# Patient Record
Sex: FEMALE | Race: WHITE | NOT HISPANIC OR LATINO | Employment: FULL TIME | URBAN - METROPOLITAN AREA
[De-identification: names, ages, dates, MRNs, and addresses within clinical notes are randomized per-mention and may not be internally consistent; named-entity substitution may affect disease eponyms.]

---

## 2017-05-04 ENCOUNTER — CONVERSION ENCOUNTER (OUTPATIENT)
Dept: RADIOLOGY | Facility: IMAGING CENTER | Age: 72
End: 2017-05-04

## 2017-05-04 ENCOUNTER — GENERIC CONVERSION - ENCOUNTER (OUTPATIENT)
Dept: OTHER | Facility: OTHER | Age: 72
End: 2017-05-04

## 2017-06-05 ENCOUNTER — LAB REQUISITION (OUTPATIENT)
Dept: LAB | Facility: HOSPITAL | Age: 72
End: 2017-06-05
Payer: COMMERCIAL

## 2017-06-05 ENCOUNTER — ALLSCRIPTS OFFICE VISIT (OUTPATIENT)
Dept: OTHER | Facility: OTHER | Age: 72
End: 2017-06-05

## 2017-06-05 DIAGNOSIS — Z01.419 ENCOUNTER FOR GYNECOLOGICAL EXAMINATION WITHOUT ABNORMAL FINDING: ICD-10-CM

## 2017-06-05 PROCEDURE — G0143 SCR C/V CYTO,THINLAYER,RESCR: HCPCS | Performed by: OBSTETRICS & GYNECOLOGY

## 2017-06-12 ENCOUNTER — GENERIC CONVERSION - ENCOUNTER (OUTPATIENT)
Dept: OTHER | Facility: OTHER | Age: 72
End: 2017-06-12

## 2017-06-12 LAB
LAB AP GYN PRIMARY INTERPRETATION: NORMAL
Lab: NORMAL

## 2017-11-20 ENCOUNTER — GENERIC CONVERSION - ENCOUNTER (OUTPATIENT)
Dept: OTHER | Facility: OTHER | Age: 72
End: 2017-11-20

## 2018-01-13 VITALS
WEIGHT: 143 LBS | BODY MASS INDEX: 24.41 KG/M2 | DIASTOLIC BLOOD PRESSURE: 62 MMHG | HEIGHT: 64 IN | SYSTOLIC BLOOD PRESSURE: 124 MMHG

## 2018-01-14 NOTE — RESULT NOTES
Verified Results  (1) THIN PREP PAP WITH IMAGING 12Jun2017 02:02PM Jon Weems     Test Name Result Flag Reference   LAB AP CASE REPORT (Report)     Gynecologic Cytology Report            Case: [id number]                  Authorizing Provider: Codie Doty MD     Collected:      06/05/2017           First Screen:     SHERIE Berrios Received:      06/07/2017 4955        Specimen:  LIQUID-BASED PAP, SCREENING, Cervix   LAB AP GYN PRIMARY INTERPRETATION      Negative for intraepithelial lesion or malignancy  Electronically signed by SHERIE Berrios on 6/12/2017 at 2:02 PM   LAB AP GYN SPECIMEN ADEQUACY      Satisfactory for evaluation  Absence of endocervical/transformation zone component  LAB AP GYN NOTE      This specimen was analyzed by the Thin Prep Imaging System  Due to   technical Imaging issues or the physical properties of the slide specimen,   comprehensive manual rescreening by a Cytotechnologist, and/or Pathologist   was indicated  LAB AP GYN ADDITIONAL INFORMATION (Report)     [employer]'s FDA approved ,  and ThinPrep Imaging System are   utilized with strict adherence to the 's instruction manual to   prepare gynecologic and non-gynecologic cytology specimens for the   production of ThinPrep slides as well as for gynecologic ThinPrep imaging  These processes have been validated by our laboratory and/or by the     The Pap test is not a diagnostic procedure and should not be used as the   sole means to detect cervical cancer  It is only a screening procedure to   aid in the detection of cervical cancer and its precursors  Both   false-negative and false-positive results have been experienced  Your   patient's test result should be interpreted in this context together with   the history and clinical findings     LAB AP LMP

## 2018-01-17 NOTE — RESULT NOTES
Verified Results  (1) THIN PREP PAP WITH IMAGING 03Jun2016 11:13AM Charmco Spotted     Test Name Result Flag Reference   LAB AP CASE REPORT (Report)     Gynecologic Cytology Report            Case: [id number]                  Authorizing Provider: Naomi Campbell MD     Collected:      06/03/2016 1113        First Screen:     SHERIE Mueller Received:      06/06/2016 1113        Specimen:  LIQUID-BASED PAP, SCREENING, Vaginal   LAB AP GYN PRIMARY INTERPRETATION      Negative for intraepithelial lesion or malignancy  Electronically signed by SHERIE Mueller on 6/16/2016 at 4:26 PM   LAB AP GYN SPECIMEN ADEQUACY      Satisfactory for evaluation  LAB AP GYN ADDITIONAL INFORMATION (Report)     [employer] FDA approved ,  and ThinPrep Imaging System are   utilized with strict adherence to the 's instruction manual to   prepare gynecologic and non-gynecologic cytology specimens for the   production of ThinPrep slides as well as for gynecologic ThinPrep imaging  These processes have been validated by our laboratory and/or by the     The Pap test is not a diagnostic procedure and should not be used as the   sole means to detect cervical cancer  It is only a screening procedure to   aid in the detection of cervical cancer and its precursors  Both   false-negative and false-positive results have been experienced  Your   patient's test result should be interpreted in this context together with   the history and clinical findings

## 2018-01-22 VITALS
DIASTOLIC BLOOD PRESSURE: 80 MMHG | TEMPERATURE: 96.9 F | WEIGHT: 144 LBS | RESPIRATION RATE: 18 BRPM | SYSTOLIC BLOOD PRESSURE: 140 MMHG | BODY MASS INDEX: 24.59 KG/M2 | HEIGHT: 64 IN | HEART RATE: 80 BPM

## 2018-05-04 DIAGNOSIS — Z12.31 ENCOUNTER FOR SCREENING MAMMOGRAM FOR MALIGNANT NEOPLASM OF BREAST: ICD-10-CM

## 2018-05-10 ENCOUNTER — CONVERSION ENCOUNTER (OUTPATIENT)
Dept: RADIOLOGY | Facility: IMAGING CENTER | Age: 73
End: 2018-05-10

## 2018-06-08 ENCOUNTER — ANNUAL EXAM (OUTPATIENT)
Dept: OBGYN CLINIC | Facility: CLINIC | Age: 73
End: 2018-06-08
Payer: COMMERCIAL

## 2018-06-08 VITALS
HEIGHT: 63 IN | BODY MASS INDEX: 25.52 KG/M2 | DIASTOLIC BLOOD PRESSURE: 80 MMHG | WEIGHT: 144 LBS | SYSTOLIC BLOOD PRESSURE: 120 MMHG

## 2018-06-08 DIAGNOSIS — Z01.419 ENCNTR FOR GYN EXAM (GENERAL) (ROUTINE) W/O ABN FINDINGS: Primary | ICD-10-CM

## 2018-06-08 DIAGNOSIS — Z78.0 MENOPAUSE: ICD-10-CM

## 2018-06-08 DIAGNOSIS — Z12.31 ENCOUNTER FOR SCREENING MAMMOGRAM FOR MALIGNANT NEOPLASM OF BREAST: ICD-10-CM

## 2018-06-08 PROCEDURE — 99213 OFFICE O/P EST LOW 20 MIN: CPT | Performed by: OBSTETRICS & GYNECOLOGY

## 2018-06-08 PROCEDURE — G0145 SCR C/V CYTO,THINLAYER,RESCR: HCPCS | Performed by: OBSTETRICS & GYNECOLOGY

## 2018-06-08 RX ORDER — CHOLECALCIFEROL (VITAMIN D3) 50 MCG
CAPSULE ORAL DAILY
COMMUNITY

## 2018-06-08 RX ORDER — DIPHENOXYLATE HYDROCHLORIDE AND ATROPINE SULFATE 2.5; .025 MG/1; MG/1
1 TABLET ORAL DAILY
COMMUNITY

## 2018-06-08 RX ORDER — SPIRONOLACTONE 50 MG/1
TABLET, FILM COATED ORAL
COMMUNITY
Start: 2018-05-07 | End: 2020-06-02 | Stop reason: ALTCHOICE

## 2018-06-08 RX ORDER — MULTIVITAMIN WITH IRON
TABLET ORAL
COMMUNITY

## 2018-06-08 NOTE — PATIENT INSTRUCTIONS
Patient was told that her breast and pelvic exam are normal  She will continue with Premarin 0 3 mg 3 times a week

## 2018-06-08 NOTE — PROGRESS NOTES
Assessment/Plan this 76-year-old patient is seen today in follow-up for her ovarian cancer of 12 years ago  She has no specific complaints at this time  This     Subjective:     Patient ID: Jose Capellan is a 74 y o  female  HPI this 76-year-old patient has had no vaginal bleeding or episodes of vaginitis over the past year  She does wear a liner daily for urine stress incontinence  She has had no urinary tract infections over the past year  She presently takes Premarin 310 mg 3 times a week  She has occasional mild hot flashes  She sleeps 6-7 hours at  Her bowel function is normal  Kilmichael is not occurring  Review of Systems  was see HPI    Objective:     Physical Exam  breast exam is normal  Her pelvic exam reveals no masses in the pelvis  The vaginal cuff is well supported  There is no blood or discharge in the vagina  Rectal exam shows no bladder masses in the rectum and no nodularity in the cul-de-sac

## 2018-06-08 NOTE — PROGRESS NOTES
Assessment/Plan: This 68-year-old patient is seen today for revisit due to her history of ovarian cancer 12 years ago  Diagnoses and all orders for this visit:    Encntr for gyn exam (general) (routine) w/o abn findings  -     Liquid-based pap, screening    Encounter for screening mammogram for malignant neoplasm of breast  -     Mammo screening bilateral w cad; Future          Subjective:     Patient ID: Isha Gurrola is a 74 y o  female  HPI this 68-year-old patient was treated for ovarian cancer with [facility] lymph node evaluation 12 years ago  She has been followed by Oncology since then  She also does take Premarin 0 3 mg daily  She has no chronic headaches fainting spells  intercourse is not occurring at this time  Her bowel function is normal   She does wear liners for urine stress incontinence  She has had no urinary tract infections over the past year  Review of Systems  see HPI    Objective:     Physical Exam  breast exam is normal  Pelvic exam reveals no masses in the pelvis  The vaginal cuff is well supported  There is no blood or discharge in the vagina  The vulva is normal  Rectal exam shows no masses or blood in the vagina and no nodularity in the cul-de-sac

## 2018-06-08 NOTE — PATIENT INSTRUCTIONS
The patient was told that her pelvic exam is normal in her breast exam is normal  I saw her today for followup as her revisit for ovarian cancer

## 2018-06-12 LAB
LAB AP GYN PRIMARY INTERPRETATION: NORMAL
Lab: NORMAL

## 2018-11-19 ENCOUNTER — OFFICE VISIT (OUTPATIENT)
Dept: SURGERY | Facility: CLINIC | Age: 73
End: 2018-11-19
Payer: COMMERCIAL

## 2018-11-19 VITALS
SYSTOLIC BLOOD PRESSURE: 116 MMHG | RESPIRATION RATE: 16 BRPM | HEIGHT: 64 IN | WEIGHT: 142 LBS | TEMPERATURE: 97.5 F | DIASTOLIC BLOOD PRESSURE: 76 MMHG | HEART RATE: 81 BPM | BODY MASS INDEX: 24.24 KG/M2

## 2018-11-19 DIAGNOSIS — Z12.39 BREAST CANCER SCREENING, HIGH RISK PATIENT: ICD-10-CM

## 2018-11-19 DIAGNOSIS — N64.4 BREAST PAIN, LEFT: Primary | ICD-10-CM

## 2018-11-19 PROCEDURE — 99213 OFFICE O/P EST LOW 20 MIN: CPT | Performed by: SURGERY

## 2018-11-19 RX ORDER — CONJUGATED ESTROGENS 0.3 MG/1
TABLET, FILM COATED ORAL
COMMUNITY
Start: 2018-10-22 | End: 2019-06-17

## 2018-11-19 NOTE — PROGRESS NOTES
Assessment/Plan:   Merary Rubio is a 74 y  o female who is here for a annual breast exam for his risk   High risk for screening due to history of ovarian cancer she is 12 years out from surgery and chemotherapy  Patient followed with her OBGYN  Patient with history of 2 stereotactic biopsies which were benign 1 in 2014 and 2007  Patient due for her colonoscopy in 2020  On exam and upon review of recent breast imaging: BiRADs 2: follow up 1 year  Prominent tender left outer quadrant, this area previously biopsied and benign    Plan: Follow up mammogram with US  Continue to follow and return to office sooner if patient notices change               _______________________________________________________  HPI:  Merary Rubio is a 74 y  o female who was referred for evaluation of No chief complaint on file       Currently: Painful left upper outer area of breast , That waxes and wanes  Duration symptoms: intermittent over past months     Symptoms:   neg for - new or changing breast lumps, nipple changes and nipple discharge    Most recent mammogram: 2018 Bi RADS 2    Previous breast biopsy: sterotactic 2014 and 2007 left breast negative    Family history: Mother    ROS:  General ROS: negative  negative for - chills, fatigue, fever or night sweats, weight loss  Respiratory ROS: no cough, shortness of breath, or wheezing  Cardiovascular ROS: no chest pain or dyspnea on exertion  Genito-Urinary ROS: no dysuria, trouble voiding, or hematuria  Musculoskeletal ROS: negative for - gait disturbance, joint pain or muscle pain  Neurological ROS: no TIA or stroke symptoms  Breast ROS: see HPI  GI ROS: see HPI  Skin ROS: no new rashes or lesions   Lymphatic ROS: no new adenopathy noted by pt  GYN ROS: see HPI, no new GYN history or bleeding noted  Psy ROS: no new mental or behavioral disturbances               There is no problem list on file for this patient          Allergies: Metoclopramide    Meds: Current Outpatient Prescriptions:     aspirin 81 MG tablet, Take by mouth, Disp: , Rfl:     Calcium Citrate-Vitamin D (CALCIUM CITRATE + D) 250-200 MG-UNIT TABS, Take by mouth, Disp: , Rfl:     HM VITAMIN D3 2000 units CAPS, Take by mouth, Disp: , Rfl:     multivitamin (THERAGRAN) TABS, Take 1 tablet by mouth, Disp: , Rfl:     PREMARIN 0 3 MG tablet, , Disp: , Rfl:     pyridoxine (VITAMIN B6) 100 mg tablet, Take by mouth, Disp: , Rfl:     spironolactone (ALDACTONE) 50 mg tablet, , Disp: , Rfl:     conjugated estrogens (PREMARIN) 0 3 mg tablet, Take 1 tablet (0 3 mg total) by mouth 3 (three) times a week for 90 days, Disp: 36 tablet, Rfl: 0    PMH:   Past Medical History:   Diagnosis Date    Basal cell carcinoma     Osteopenia     Ovarian cancer ([facility] [address] ) 2005       PSHx:   Past Surgical History:   Procedure Laterality Date    ABDOMINAL HYSTERECTOMY N/A     ovarian cancer    BOWEL RESECTION      BREAST BIOPSY      COLONOSCOPY      LYMPHADENECTOMY      SKIN CANCER EXCISION         Family History:   Family History   Problem Relation Age of Onset    Heart failure Mother     Hypertension Father     Stroke Father     No Known Problems Brother     No Known Problems Brother        Social History:  reports that she has quit smoking  She has never used smokeless tobacco  She reports that she drinks alcohol  She reports that she does not use drugs  Imaging: No new pertinent imaging studies         No results found for: WBC, HGB, HCT, MCV, PLT  No results found for: NA, K, CL, CO2, ANIONGAP, BUN, CREATININE, GLUCOSE, GLUF, CALCIUM, CORRECTEDCA, AST, ALT, ALKPHOS, PROT, BILITOT, EGFR          PHYSICAL EXAM  General Appearance:    Alert, cooperative, no distress, heathy   Head:    Normocephalic without obvious abnormality   Eyes:    PERRL, conjunctiva/corneas clear, EOM's intact        Neck:   Supple, no adenopathy, no JVD   Back:     Symmetric, no spinal or CVA tenderness   Lungs:     Clear to auscultation bilaterally, no wheezing or rhonchi   Heart:  Breast:    Regular rate and rhythm, S1 and S2 normal, no murmur    Inspection negative, No nipple retraction or dimpling, No nipple discharge or bleeding, symmetric fibrous changes in both upper outer quadrants left more prominent than right  Abdomen:     Soft, nontender   Extremities:   Extremities normal  No clubbing, cyanosis or edema   Psych:   Normal Affect, AOx3  Neurologic:  Skin:   CNII-XII intact  Strength symmetric, speech intact    Warm, dry, intact, no visible rashes or lesions                       Signature:     Delores Ward MD    Date: 11/19/2018 Time: 9:01 AM                 Some portions of this record may have been generated with voice recognition software  There may be translation, syntax,  or grammatical errors  Occasional wrong word or "sound-a-like" substitutions may have occurred due to the inherent limitations of the voice recognition software  Read the chart carefully and recognize, using context, where substitutions may have occurred  If you have any questions, please contact the dictating provider for clarification or correction, as needed  This encounter has been coded by a physician, non certified coder

## 2018-11-19 NOTE — LETTER
November 19, 2018     Celestino Ramirez, [address] [facility] [address]    Patient: Al Disla   YOB: 1945   Date of Visit: 11/19/2018       Dear Dr Christine Funez: Thank you for referring Al Disla to me for evaluation  Below are my notes for this consultation  If you have questions, please do not hesitate to call me  I look forward to following your patient along with you  Sincerely,        Kris Sparks MD        CC: No Recipients  Farnaz Man  11/19/2018  9:10 AM  Sign at close encounter  Assessment/Plan:   Al Disla is a 74 y  o female who is here for a annual breast exam for his risk   High risk for screening due to history of ovarian cancer she is 12 years out from surgery and chemotherapy  Patient followed with her OBGYN  Patient with history of 2 stereotactic biopsies which were benign 1 in 2014 and 2007  Patient due for her colonoscopy in 2020  On exam and upon review of recent breast imaging: BiRADs 2: follow up 1 year  Prominent tender left outer quadrant, this area previously biopsied and benign    Plan: Follow up mammogram with US  Continue to follow and return to office sooner if patient notices change               _______________________________________________________  HPI:  Al Disla is a 74 y  o female who was referred for evaluation of No chief complaint on file       Currently: Painful left upper outer area of breast , That waxes and wanes  Duration symptoms: intermittent over past months     Symptoms:   neg for - new or changing breast lumps, nipple changes and nipple discharge    Most recent mammogram: 2018 Bi RADS 2    Previous breast biopsy: sterotactic 2014 and 2007 left breast negative    Family history:  Mother    ROS:  General ROS: negative  negative for - chills, fatigue, fever or night sweats, weight loss  Respiratory ROS: no cough, shortness of breath, or wheezing  Cardiovascular ROS: no chest pain or dyspnea on exertion  Genito-Urinary ROS: no dysuria, trouble voiding, or hematuria  Musculoskeletal ROS: negative for - gait disturbance, joint pain or muscle pain  Neurological ROS: no TIA or stroke symptoms  Breast ROS: see HPI  GI ROS: see HPI  Skin ROS: no new rashes or lesions   Lymphatic ROS: no new adenopathy noted by pt  GYN ROS: see HPI, no new GYN history or bleeding noted  Psy ROS: no new mental or behavioral disturbances               There is no problem list on file for this patient  Allergies: Metoclopramide    Meds:   Current Outpatient Prescriptions:     aspirin 81 MG tablet, Take by mouth, Disp: , Rfl:     Calcium Citrate-Vitamin D (CALCIUM CITRATE + D) 250-200 MG-UNIT TABS, Take by mouth, Disp: , Rfl:     HM VITAMIN D3 2000 units CAPS, Take by mouth, Disp: , Rfl:     multivitamin (THERAGRAN) TABS, Take 1 tablet by mouth, Disp: , Rfl:     PREMARIN 0 3 MG tablet, , Disp: , Rfl:     pyridoxine (VITAMIN B6) 100 mg tablet, Take by mouth, Disp: , Rfl:     spironolactone (ALDACTONE) 50 mg tablet, , Disp: , Rfl:     conjugated estrogens (PREMARIN) 0 3 mg tablet, Take 1 tablet (0 3 mg total) by mouth 3 (three) times a week for 90 days, Disp: 36 tablet, Rfl: 0    PMH:   Past Medical History:   Diagnosis Date    Basal cell carcinoma     Osteopenia     Ovarian cancer ([facility] [address] ) 2005       PSHx:   Past Surgical History:   Procedure Laterality Date    ABDOMINAL HYSTERECTOMY N/A     ovarian cancer    BOWEL RESECTION      BREAST BIOPSY      COLONOSCOPY      LYMPHADENECTOMY      SKIN CANCER EXCISION         Family History:   Family History   Problem Relation Age of Onset    Heart failure Mother     Hypertension Father     Stroke Father     No Known Problems Brother     No Known Problems Brother        Social History:  reports that she has quit smoking  She has never used smokeless tobacco  She reports that she drinks alcohol   She reports that she does not use drugs           Imaging: No new pertinent imaging studies  No results found for: WBC, HGB, HCT, MCV, PLT  No results found for: NA, K, CL, CO2, ANIONGAP, BUN, CREATININE, GLUCOSE, GLUF, CALCIUM, CORRECTEDCA, AST, ALT, ALKPHOS, PROT, BILITOT, EGFR          PHYSICAL EXAM  General Appearance:    Alert, cooperative, no distress, heathy   Head:    Normocephalic without obvious abnormality   Eyes:    PERRL, conjunctiva/corneas clear, EOM's intact        Neck:   Supple, no adenopathy, no JVD   Back:     Symmetric, no spinal or CVA tenderness   Lungs:     Clear to auscultation bilaterally, no wheezing or rhonchi   Heart:  Breast:    Regular rate and rhythm, S1 and S2 normal, no murmur    Inspection negative, No nipple retraction or dimpling, No nipple discharge or bleeding, symmetric fibrous changes in both upper outer quadrants left more prominent than right  Abdomen:     Soft, nontender   Extremities:   Extremities normal  No clubbing, cyanosis or edema   Psych:   Normal Affect, AOx3  Neurologic:  Skin:   CNII-XII intact  Strength symmetric, speech intact    Warm, dry, intact, no visible rashes or lesions                       Signature:     James Goodwin MD    Date: 11/19/2018 Time: 9:01 AM                 Some portions of this record may have been generated with voice recognition software  There may be translation, syntax,  or grammatical errors  Occasional wrong word or "sound-a-like" substitutions may have occurred due to the inherent limitations of the voice recognition software  Read the chart carefully and recognize, using context, where substitutions may have occurred  If you have any questions, please contact the dictating provider for clarification or correction, as needed  This encounter has been coded by a physician, non certified coder

## 2019-04-29 ENCOUNTER — TELEPHONE (OUTPATIENT)
Dept: OBGYN CLINIC | Facility: CLINIC | Age: 74
End: 2019-04-29

## 2019-04-29 DIAGNOSIS — R92.8 ABNORMAL MAMMOGRAM: Primary | ICD-10-CM

## 2019-04-30 ENCOUNTER — TRANSCRIBE ORDERS (OUTPATIENT)
Dept: ADMINISTRATIVE | Facility: HOSPITAL | Age: 74
End: 2019-04-30

## 2019-04-30 DIAGNOSIS — R92.8 ABNORMAL MAMMOGRAM: Primary | ICD-10-CM

## 2019-05-17 ENCOUNTER — TRANSCRIBE ORDERS (OUTPATIENT)
Dept: ADMINISTRATIVE | Facility: HOSPITAL | Age: 74
End: 2019-05-17

## 2019-05-17 DIAGNOSIS — N64.4 PAIN IN BREAST: Primary | ICD-10-CM

## 2019-05-21 ENCOUNTER — HOSPITAL ENCOUNTER (OUTPATIENT)
Dept: MAMMOGRAPHY | Facility: CLINIC | Age: 74
Discharge: HOME | End: 2019-05-21
Payer: COMMERCIAL

## 2019-05-21 ENCOUNTER — HOSPITAL ENCOUNTER (OUTPATIENT)
Dept: MAMMOGRAPHY | Facility: CLINIC | Age: 74
Discharge: HOME | End: 2019-05-21
Attending: SURGERY
Payer: COMMERCIAL

## 2019-05-21 ENCOUNTER — TELEPHONE (OUTPATIENT)
Dept: OBGYN CLINIC | Facility: CLINIC | Age: 74
End: 2019-05-21

## 2019-05-21 VITALS — BODY MASS INDEX: 24.24 KG/M2 | WEIGHT: 142 LBS | HEIGHT: 64 IN

## 2019-05-21 DIAGNOSIS — N64.4 BREAST PAIN, LEFT: ICD-10-CM

## 2019-05-21 DIAGNOSIS — N64.4 PAIN IN BREAST: ICD-10-CM

## 2019-05-21 PROCEDURE — 76642 ULTRASOUND BREAST LIMITED: CPT

## 2019-05-21 PROCEDURE — 77066 DX MAMMO INCL CAD BI: CPT

## 2019-05-30 ENCOUNTER — HOSPITAL ENCOUNTER (OUTPATIENT)
Dept: MAMMOGRAPHY | Facility: CLINIC | Age: 74
Discharge: HOME | End: 2019-05-30
Payer: COMMERCIAL

## 2019-05-30 ENCOUNTER — HOSPITAL ENCOUNTER (OUTPATIENT)
Dept: ULTRASOUND IMAGING | Facility: CLINIC | Age: 74
Discharge: HOME | End: 2019-05-30
Admitting: RADIOLOGY
Payer: COMMERCIAL

## 2019-05-30 VITALS
BODY MASS INDEX: 24.24 KG/M2 | SYSTOLIC BLOOD PRESSURE: 102 MMHG | DIASTOLIC BLOOD PRESSURE: 64 MMHG | WEIGHT: 142 LBS | HEIGHT: 64 IN | HEART RATE: 72 BPM

## 2019-05-30 DIAGNOSIS — R92.8 ABNORMAL ULTRASOUND OF BREAST: ICD-10-CM

## 2019-05-30 PROCEDURE — 88367 INSITU HYBRIDIZATION AUTO: CPT | Performed by: PATHOLOGY

## 2019-05-30 PROCEDURE — 19083 BX BREAST 1ST LESION US IMAG: CPT

## 2019-05-30 PROCEDURE — 88305 TISSUE EXAM BY PATHOLOGIST: CPT | Performed by: PATHOLOGY

## 2019-05-30 PROCEDURE — 88360 TUMOR IMMUNOHISTOCHEM/MANUAL: CPT | Performed by: PATHOLOGY

## 2019-05-30 RX ORDER — LIDOCAINE HYDROCHLORIDE 10 MG/ML
4 INJECTION, SOLUTION INFILTRATION; PERINEURAL ONCE
Status: COMPLETED | OUTPATIENT
Start: 2019-05-30 | End: 2019-05-30

## 2019-05-30 RX ADMIN — LIDOCAINE HYDROCHLORIDE 4 ML: 10 INJECTION, SOLUTION INFILTRATION; PERINEURAL at 10:51

## 2019-05-30 RX ADMIN — LIDOCAINE HYDROCHLORIDE 4 ML: 10 INJECTION, SOLUTION INFILTRATION; PERINEURAL at 10:46

## 2019-06-06 ENCOUNTER — OFFICE VISIT (OUTPATIENT)
Dept: SURGERY | Facility: CLINIC | Age: 74
End: 2019-06-06
Payer: COMMERCIAL

## 2019-06-06 VITALS
SYSTOLIC BLOOD PRESSURE: 142 MMHG | HEIGHT: 64 IN | BODY MASS INDEX: 24.24 KG/M2 | RESPIRATION RATE: 18 BRPM | HEART RATE: 77 BPM | DIASTOLIC BLOOD PRESSURE: 68 MMHG | TEMPERATURE: 97.8 F | WEIGHT: 142 LBS

## 2019-06-06 DIAGNOSIS — I89.0 LYMPHEDEMA OF LEFT LEG: ICD-10-CM

## 2019-06-06 DIAGNOSIS — C50.412 MALIGNANT NEOPLASM OF UPPER-OUTER QUADRANT OF LEFT FEMALE BREAST, UNSPECIFIED ESTROGEN RECEPTOR STATUS (HCC): Primary | ICD-10-CM

## 2019-06-06 PROCEDURE — 99214 OFFICE O/P EST MOD 30 MIN: CPT | Performed by: SURGERY

## 2019-06-07 ENCOUNTER — TELEPHONE (OUTPATIENT)
Dept: SURGERY | Facility: CLINIC | Age: 74
End: 2019-06-07

## 2019-06-10 ENCOUNTER — DOCUMENTATION (OUTPATIENT)
Dept: HEMATOLOGY ONCOLOGY | Facility: CLINIC | Age: 74
End: 2019-06-10

## 2019-06-10 ENCOUNTER — ANNUAL EXAM (OUTPATIENT)
Dept: OBGYN CLINIC | Facility: CLINIC | Age: 74
End: 2019-06-10
Payer: COMMERCIAL

## 2019-06-10 ENCOUNTER — TELEPHONE (OUTPATIENT)
Dept: OTHER | Facility: HOSPITAL | Age: 74
End: 2019-06-10

## 2019-06-10 VITALS
BODY MASS INDEX: 24.45 KG/M2 | DIASTOLIC BLOOD PRESSURE: 66 MMHG | WEIGHT: 138 LBS | HEIGHT: 63 IN | SYSTOLIC BLOOD PRESSURE: 118 MMHG

## 2019-06-10 DIAGNOSIS — Z12.31 ENCOUNTER FOR SCREENING MAMMOGRAM FOR MALIGNANT NEOPLASM OF BREAST: Primary | ICD-10-CM

## 2019-06-10 DIAGNOSIS — C50.912 BREAST CANCER, LEFT (HCC): Primary | ICD-10-CM

## 2019-06-10 DIAGNOSIS — Z01.419 ENCOUNTER FOR GYNECOLOGICAL EXAMINATION (GENERAL) (ROUTINE) WITHOUT ABNORMAL FINDINGS: ICD-10-CM

## 2019-06-10 PROCEDURE — G0145 SCR C/V CYTO,THINLAYER,RESCR: HCPCS | Performed by: OBSTETRICS & GYNECOLOGY

## 2019-06-10 PROCEDURE — S0612 ANNUAL GYNECOLOGICAL EXAMINA: HCPCS | Performed by: OBSTETRICS & GYNECOLOGY

## 2019-06-17 ENCOUNTER — CONSULT (OUTPATIENT)
Dept: HEMATOLOGY ONCOLOGY | Facility: CLINIC | Age: 74
End: 2019-06-17
Payer: COMMERCIAL

## 2019-06-17 VITALS
RESPIRATION RATE: 16 BRPM | SYSTOLIC BLOOD PRESSURE: 140 MMHG | TEMPERATURE: 98.4 F | WEIGHT: 138.8 LBS | BODY MASS INDEX: 24.59 KG/M2 | HEIGHT: 63 IN | DIASTOLIC BLOOD PRESSURE: 70 MMHG | HEART RATE: 78 BPM | OXYGEN SATURATION: 98 %

## 2019-06-17 DIAGNOSIS — Z17.0 MALIGNANT NEOPLASM OF UPPER-OUTER QUADRANT OF LEFT BREAST IN FEMALE, ESTROGEN RECEPTOR POSITIVE (HCC): Primary | ICD-10-CM

## 2019-06-17 DIAGNOSIS — C50.412 MALIGNANT NEOPLASM OF UPPER-OUTER QUADRANT OF LEFT BREAST IN FEMALE, ESTROGEN RECEPTOR POSITIVE (HCC): Primary | ICD-10-CM

## 2019-06-17 LAB
LAB AP GYN PRIMARY INTERPRETATION: NORMAL
Lab: NORMAL

## 2019-06-17 PROCEDURE — 99244 OFF/OP CNSLTJ NEW/EST MOD 40: CPT | Performed by: INTERNAL MEDICINE

## 2019-06-19 ENCOUNTER — TELEPHONE (OUTPATIENT)
Dept: SURGERY | Facility: CLINIC | Age: 74
End: 2019-06-19

## 2019-06-19 DIAGNOSIS — F41.9 ANXIETY: Primary | ICD-10-CM

## 2019-06-26 RX ORDER — LORAZEPAM 0.5 MG/1
0.5 TABLET ORAL ONCE
Qty: 2 TABLET | Refills: 0 | Status: SHIPPED | OUTPATIENT
Start: 2019-06-26 | End: 2019-07-08 | Stop reason: ALTCHOICE

## 2019-07-01 ENCOUNTER — HOSPITAL ENCOUNTER (OUTPATIENT)
Dept: RADIOLOGY | Facility: HOSPITAL | Age: 74
Discharge: HOME | End: 2019-07-01
Attending: SURGERY
Payer: COMMERCIAL

## 2019-07-01 DIAGNOSIS — C50.912 BREAST CANCER, LEFT (HCC): ICD-10-CM

## 2019-07-01 PROCEDURE — C8908 MRI W/O FOL W/CONT, BREAST,: HCPCS

## 2019-07-01 PROCEDURE — 77049 MRI BREAST C-+ W/CAD BI: CPT

## 2019-07-02 ENCOUNTER — TELEPHONE (OUTPATIENT)
Dept: OTHER | Facility: HOSPITAL | Age: 74
End: 2019-07-02

## 2019-07-02 NOTE — TELEPHONE ENCOUNTER
Greetings! I am  back from my mission trip  I reviewed Aliyah's studies  Her MRI shows no chest wall invasion and with 23 mm tumor  Her genetic testing is negative although there are some variance of uncertain clinical significance  These results are on the chart  The genetic testing is under media  She has been informed of both results  She is very confused despite multiple efforts to clarify the issues with her and her   We have spent a great deal of time reviewing this with her  The issue is if she wants lumpectomy, ( I am asking you your opinion) , she might  benefit from several months of tamoxifen therapy prior to surgery? The tumor is smaller than originally discussed at tumor board where they measured greater than 3 cm but still it is 2 3 cm by MRI with some scattering of calcifications around it on mammo  If she wants a mastectomy then she does not need a preoperative shrinkage of the tumor  I would appreciate your feedback so that a may advise her once again  I am not sure if she set up to follow up with your office      Thanks    Annabel Hair

## 2019-07-02 NOTE — RESULT ENCOUNTER NOTE
Please call pt with abnormal results and schedule follow up  I called the patient spoke at length with the  and gave him these results, which were neither unfavorable nor more favorable  It is slightly smaller than anticipated by the mammogram reading  Defer judgment on whether preop tamoxifen would be appropriate to the medical oncologist   Steve De Los Santos the  that all options are still on the table

## 2019-07-05 ENCOUNTER — TRANSCRIBE ORDERS (OUTPATIENT)
Dept: ADMINISTRATIVE | Facility: HOSPITAL | Age: 74
End: 2019-07-05

## 2019-07-05 ENCOUNTER — HOSPITAL ENCOUNTER (OUTPATIENT)
Dept: RADIOLOGY | Facility: IMAGING CENTER | Age: 74
Discharge: HOME | End: 2019-07-05
Payer: COMMERCIAL

## 2019-07-05 DIAGNOSIS — C50.412 MALIGNANT NEOPLASM OF UPPER-OUTER QUADRANT OF LEFT BREAST IN FEMALE, ESTROGEN RECEPTOR POSITIVE (HCC): ICD-10-CM

## 2019-07-05 DIAGNOSIS — Z17.0 MALIGNANT NEOPLASM OF UPPER-OUTER QUADRANT OF LEFT BREAST IN FEMALE, ESTROGEN RECEPTOR POSITIVE (HCC): ICD-10-CM

## 2019-07-05 PROCEDURE — 71046 X-RAY EXAM CHEST 2 VIEWS: CPT

## 2019-07-08 ENCOUNTER — CONSULT (OUTPATIENT)
Dept: SURGICAL ONCOLOGY | Facility: CLINIC | Age: 74
End: 2019-07-08
Payer: COMMERCIAL

## 2019-07-08 ENCOUNTER — TELEPHONE (OUTPATIENT)
Dept: SURGERY | Facility: CLINIC | Age: 74
End: 2019-07-08

## 2019-07-08 VITALS
BODY MASS INDEX: 24.2 KG/M2 | HEART RATE: 80 BPM | HEIGHT: 63 IN | DIASTOLIC BLOOD PRESSURE: 76 MMHG | WEIGHT: 136.6 LBS | RESPIRATION RATE: 18 BRPM | SYSTOLIC BLOOD PRESSURE: 142 MMHG

## 2019-07-08 DIAGNOSIS — Z17.0 MALIGNANT NEOPLASM OF UPPER-OUTER QUADRANT OF LEFT BREAST IN FEMALE, ESTROGEN RECEPTOR POSITIVE (HCC): ICD-10-CM

## 2019-07-08 DIAGNOSIS — C50.412 MALIGNANT NEOPLASM OF UPPER-OUTER QUADRANT OF LEFT BREAST IN FEMALE, ESTROGEN RECEPTOR POSITIVE (HCC): ICD-10-CM

## 2019-07-08 DIAGNOSIS — Z13.71 BRCA NEGATIVE: Primary | ICD-10-CM

## 2019-07-08 PROBLEM — C50.919 BREAST CANCER (HCC): Status: ACTIVE | Noted: 2019-05-30

## 2019-07-08 PROCEDURE — 99245 OFF/OP CONSLTJ NEW/EST HI 55: CPT | Performed by: SURGERY

## 2019-07-08 NOTE — PROGRESS NOTES
Breast Consultation-Surgical Oncology     [address] [facility]  [address]     Name:  Alexi Ryan  YOB: 1945  MRN:  [id number]    Assessment/Plan   Diagnoses and all orders for this visit:    BRCA negative    Malignant neoplasm of upper-outer quadrant of left breast in female, estrogen receptor positive ([address] )          HPI: Alexi Ryan is a 74y o  year old female who presents with a left breast cancer for a second opinion  Pt shares she felt a left breast lump towards the axillary tail  She had diagnostic breast imaging completed and a left breast biopsy on 19  This revealed an invasive ductal carcinoma, ER/NY positive and HER2 2+,  FISH negative  Pt shares her biopsy site has healed  Surgical treatment to date consisted of not applicable  Oncology History:     No history exists  Pertinent reproductive history:  Age at menarche:  [age]  OB History        3    Para   1    Term   0            AB   1    Living   3       SAB   1    TAB        Ectopic        Multiple   1    Live Births   2           Obstetric Comments   Age at menarche [age]  Age at first pregnancy 20  History of BCP use  History of Premarin use           Age at first live birth:  20  Age at menopause:  Unknown  Hysterectomy/Oophrectomy:  Yes  Hormone replacement therapy:  Yes  Birth control pills:  Yes    Problem List:   There is no problem list on file for this patient      Past Medical History:   Diagnosis Date    Basal cell carcinoma     Breast cancer ([address] ) 2019    left breast    Osteopenia     Ovarian cancer ([facility] )      Past Surgical History:   Procedure Laterality Date    ABDOMINAL HYSTERECTOMY N/A     ovarian cancer    BOWEL RESECTION      BREAST BIOPSY Left 2015    BREAST BIOPSY Right 2014    BREAST BIOPSY Left     BREAST BIOPSY Left 2019    [facility]    CATARACT EXTRACTION Right 2008     SECTION      COLONOSCOPY      HYSTERECTOMY      age 56    LYMPHADENECTOMY      SKIN CANCER EXCISION      US GUIDED BREAST BIOPSY LEFT COMPLETE Left 2019     Family History   Problem Relation Age of Onset    Heart failure Mother     Breast cancer Mother 78    Hypertension Father     Stroke Father     No Known Problems Brother     No Known Problems Brother     No Known Problems Daughter     No Known Problems Maternal Grandmother     Cancer Maternal Grandfather 61        chest and neck    No Known Problems Paternal Grandmother     Cancer Paternal Grandfather         unknown type and age   [employer] No Known Problems Daughter     Stomach cancer Maternal Aunt 46    Colon cancer Maternal Uncle 71    Lung cancer Maternal Aunt 48     Social History     Socioeconomic History    Marital status: /Civil Union     Spouse name: Not on file    Number of children: Not on file    Years of education: Not on file    Highest education level: Not on file   Occupational History    Not on file   Social Needs    Financial resource strain: Not on file    Food insecurity:     Worry: Not on file     Inability: Not on file    Transportation needs:     Medical: Not on file     Non-medical: Not on file   Tobacco Use    Smoking status: Former Smoker    Smokeless tobacco: Never Used    Tobacco comment: Quit   Substance and Sexual Activity    Alcohol use: Yes     Comment: occasional    Drug use: No    Sexual activity: Yes     Partners: Male     Birth control/protection: Post-menopausal, Surgical     Comment:    Lifestyle    Physical activity:     Days per week: Not on file     Minutes per session: Not on file    Stress: Not on file   Relationships    Social connections:     Talks on phone: Not on file     Gets together: Not on file     Attends Jehov[employer]'s witness service: Not on file     Active member of club or organization: Not on file     Attends meetings of clubs or organizations: Not on file     Relationship status: Not on file    Intimate partner violence:     Fear of current or ex partner: Not on file     Emotionally abused: Not on file     Physically abused: Not on file     Forced sexual activity: Not on file   Other Topics Concern    Not on file   Social History Narrative    Not on file     Current Outpatient Medications   Medication Sig Dispense Refill    aspirin 81 MG tablet Take by mouth      Calcium Citrate-Vitamin D (CALCIUM CITRATE + D) 250-200 MG-UNIT TABS Take by mouth      HM VITAMIN D3 2000 units CAPS Take by mouth      multivitamin (THERAGRAN) TABS Take 1 tablet by mouth      pyridoxine (VITAMIN B6) 100 mg tablet Take by mouth      spironolactone (ALDACTONE) 50 mg tablet        No current facility-administered medications for this visit  Allergies   Allergen Reactions    Metoclopramide Myalgia     Reaction Date: 03Jun2011; Annotation - [id number]: hand cramps and biting of cheeks         The following portions of the patient's history were reviewed and updated as appropriate: allergies, current medications, past family history, past medical history, past social history, past surgical history and problem list     Review of Systems:  Review of Systems   Constitutional: Negative  Negative for appetite change and fever  Eyes: Negative  Respiratory: Negative for shortness of breath  Cardiovascular: Negative  Gastrointestinal: Negative  Endocrine: Negative  Genitourinary: Negative  Musculoskeletal: Negative  Negative for arthralgias and myalgias  Skin: Negative  Allergic/Immunologic: Negative  Neurological: Positive for weakness (Secondary to her history of polio)  Hematological: Negative  Negative for adenopathy  Does not bruise/bleed easily  Psychiatric/Behavioral: Negative  Physical Exam:  Physical Exam   Constitutional: She is oriented to person, place, and time  She appears well-developed and well-nourished  HENT:   Head: Normocephalic and atraumatic  Cardiovascular: Normal heart sounds  Pulmonary/Chest: Breath sounds normal  Right breast exhibits no inverted nipple, no mass, no nipple discharge, no skin change and no tenderness  Left breast exhibits mass  Left breast exhibits no inverted nipple, no nipple discharge, no skin change and no tenderness  Abdominal: Soft  Musculoskeletal:   Lymphedema of the left lower extremity, wearing compression garment   Lymphadenopathy:        Right axillary: No pectoral and no lateral adenopathy present  Left axillary: No pectoral and no lateral adenopathy present  Right: No supraclavicular adenopathy present  Left: No supraclavicular adenopathy present  Neurological: She is alert and oriented to person, place, and time  Psychiatric: She has a normal mood and affect         Laboratory:  2019 core biopsy left breast 0200 hours axis    Pathology revealed: invasive ductal carcinoma    Histologic grade: moderately differentiated     Angiolymphatic invasion:  absent    Tumor node status:  Clinically Negative    Hormone receptor status:  estrogen receptor high and progesterone receptor high, HER2 Rory is negative    Other studies:  My Risk genetic panel shows no mutations, there are several variants which currently bear no clinical significance    Imagin/10/18   Bilateral screening mammogram  B2  19   bilateral dx mammogram/left US  B4 (3) upper outer quadrant asymmetry on the left side, on ultrasound there was a 6 millimeter irregular hypoechoic lesion for which a biopsy was recommended, the right side was benign/stable  19   Left breast biopsy at the time of the left breast core biopsy, the ultrasound showed hypoechoic lesions spanning an area of 3 5 centimeters  19   Breast MRI 2 3 centimeter area of non mass enhancement in the segmental distribution in the upper outer quadrant of the left breast in the area of the biopsy proven carcinoma, this abuts but does not invade the pectoralis muscle, the right side is benign            Discussion/Summary:  75-year-old female here today for a 2nd opinion regarding her newly diagnosed carcinoma of the left breast  She was able to palpate a mass and had diagnostic imaging  This led to a biopsy  She has a clinical stage IB invasive ductal carcinoma, ER/MA highly positive and HER2 Rory negative  She has a history of ovarian cancer as well as family history of breast cancer in her mother; therefore, she had genetic testing, which was negative  There was a size discrepancy on her diagnostic ultrasound verses the left breast ultrasound done at the time of her biopsy  She also has dense glandular tissue  An MRI was therefore performed  This shows roughly 2 3 centimeters of non mass segmental distribution enhancement  This is unifocal in nature  She is a candidate for breast conservation  I discussed a left lumpectomy with lymphatic mapping and sentinel node biopsy with her  If she has widely clean margins and a clean sentinel node/nodes, she may be able to forego radiation therapy  She would then be referred back to Dr Yolanda Lynch for adjuvant hormone therapy  She was counseled on a lumpectomy by Dr Genie Harden as well  She did however meet with Dr Bassem Valle this morning to discuss reconstruction  She understands that if she proceeds with the mastectomy, the recommendations about systemic therapy would hold  She is currently weighing all of her options  All of her questions were answered today

## 2019-07-08 NOTE — TELEPHONE ENCOUNTER
Received faxed request from [facility], [address] [place] [address] records to [phone]

## 2019-07-10 ENCOUNTER — TELEPHONE (OUTPATIENT)
Dept: SURGERY | Facility: CLINIC | Age: 74
End: 2019-07-10

## 2019-07-10 NOTE — TELEPHONE ENCOUNTER
Patient called to let us know she will be going with Dr Dorys Casarez for her surgery  Advised her to call the office if anything changes

## 2019-07-11 ENCOUNTER — TELEPHONE (OUTPATIENT)
Dept: SURGICAL ONCOLOGY | Facility: CLINIC | Age: 74
End: 2019-07-11

## 2019-07-11 NOTE — TELEPHONE ENCOUNTER
Spoke with patient regarding her decisions about proceeding with surgery   She has decided to proceed with surgery and has made an appt with Dr Giacomo Waldron on 07/16/19 at 10:30 AM

## 2019-07-12 ENCOUNTER — OFFICE VISIT (OUTPATIENT)
Dept: HEMATOLOGY ONCOLOGY | Facility: CLINIC | Age: 74
End: 2019-07-12
Payer: COMMERCIAL

## 2019-07-12 VITALS
RESPIRATION RATE: 18 BRPM | SYSTOLIC BLOOD PRESSURE: 142 MMHG | HEIGHT: 64 IN | TEMPERATURE: 98.1 F | WEIGHT: 141.2 LBS | OXYGEN SATURATION: 97 % | DIASTOLIC BLOOD PRESSURE: 68 MMHG | HEART RATE: 79 BPM | BODY MASS INDEX: 24.11 KG/M2

## 2019-07-12 DIAGNOSIS — C50.412 MALIGNANT NEOPLASM OF UPPER-OUTER QUADRANT OF LEFT BREAST IN FEMALE, ESTROGEN RECEPTOR POSITIVE (HCC): Primary | ICD-10-CM

## 2019-07-12 DIAGNOSIS — Z17.0 MALIGNANT NEOPLASM OF UPPER-OUTER QUADRANT OF LEFT BREAST IN FEMALE, ESTROGEN RECEPTOR POSITIVE (HCC): Primary | ICD-10-CM

## 2019-07-12 PROCEDURE — 99214 OFFICE O/P EST MOD 30 MIN: CPT | Performed by: INTERNAL MEDICINE

## 2019-07-12 NOTE — LETTER
July 12, 2019     Nadeem Cuevas, [address] [facility] [address]    Patient: Jose Capellan   YOB: 1945   Date of Visit: 7/12/2019       Dear Dr Ranjeet Neumann: Thank you for referring Jose Capellan to me for evaluation  Below are my notes for this consultation  If you have questions, please do not hesitate to call me  I look forward to following your patient along with you  Sincerely,        Rayne Roach MD        CC: No Recipients  Rayne Roach MD  7/12/2019  9:15 AM  Sign at close encounter    HPI:  Follow-up visit for hormone receptor positive( % and %) and HER2 negative by FISH, grade 2 invasive mammary carcinoma in the upper outer quadrant of left breast   There has been some discrepancy in the size  Patient will be going for lumpectomy in the near future  Date to be scheduled  Patient will come back to our office within 1 month post surgery  Patient states she will call for the appointment  Patient was taking Premarin for the last several years that she stopped recently because of breast cancer and she would not be going back to taking Premarin  She has been having slight clear discharge from the nipples for the last several years  She had polio when she was little and since then she has some dysphagia that is nothing new  She gives history of arthritis in her hips, fingers and ankles  History of lymphedema of left leg and she has compression stockings  Patient was 23 and she had her 1st child  Menstrual periods started age 4 and last menstrual period was in mid to late 45s  She had ovarian cancer surgery in 2005  Patient states she was checked for genetic mutation in 2013 and results are negative and she was recently checked again   She has 3 children  There is family history of breast cancer, lung cancer, stomach cancer and colon cancer ?, all on her mother side                Current Outpatient Medications:    aspirin 81 MG tablet, Take by mouth, Disp: , Rfl:     Calcium Citrate-Vitamin D (CALCIUM CITRATE + D) 250-200 MG-UNIT TABS, Take by mouth, Disp: , Rfl:     HM VITAMIN D3 2000 units CAPS, Take by mouth, Disp: , Rfl:     multivitamin (THERAGRAN) TABS, Take 1 tablet by mouth, Disp: , Rfl:     pyridoxine (VITAMIN B6) 100 mg tablet, Take by mouth, Disp: , Rfl:     spironolactone (ALDACTONE) 50 mg tablet, , Disp: , Rfl:     Allergies   Allergen Reactions    Metoclopramide Myalgia     Reaction Date: 03Jun2011; Annotation - [id number]: hand cramps and biting of cheeks          Malignant neoplasm of upper-outer quadrant of left breast in female, estrogen receptor positive ([facility] )    5/30/2019 Initial Diagnosis     Breast cancer ([facility] )      7/8/2019 -  Cancer Staged     Staging form: Breast, AJCC 8th Edition  - Clinical: Stage IB (cT2, cN0, cM0, G2, ER+, VA+, HER2-) - Signed by Leticia Doran MD on 7/8/2019  Laterality: Left  Method of lymph node assessment: Clinical  Histologic grading system: 3 grade system           ROS:  07/12/19 Reviewed 13 systems:  Presently no other neurological, cardiac, pulmonary, GI and  symptoms other than mentioned above  No other symptoms like fever, chills, bleeding, bone pains, skin rash, weight loss,  tiredness ,  weakness, numbness,  claudication and gait problem  No frequent infections  Not unusually sensitive to heat or cold  No swollen glands  Patient is anxious  Other symptoms are in HPI        /68 (BP Location: Left arm, Patient Position: Sitting, Cuff Size: Adult)   Pulse 79   Temp 98 1 °F (36 7 °C)   Resp 18   Ht 5' 3 5" (1 613 m)   Wt 64 kg (141 lb 3 2 oz)   SpO2 97%   BMI 24 62 kg/m²      Physical Exam:    Patient is alert and oriented  She is not in distress  Vital signs are as above    There is no scleral icterus, no oral thrush, no palpable neck mass, clear lung fields, regular heart rate, abdomen  soft and non tender, no palpable abdominal mass, no ascites, has lymphedema of left leg, no calf tenderness, no focal neurological deficit, no skin rash, no palpable lymphadenopathy in the neck and axillary areas, no clubbing  Patient is anxious  Performance status 1  IMAGING:  IMPRESSION:     No acute cardiopulmonary disease            Workstation performed: [id number]      Imaging     XR chest pa & lateral (Order: [id number]) - 7/5/2019       LABS:  Results for orders placed or performed in visit on 06/10/19   Liquid-based pap, screening   Result Value Ref Range    Case Report       Gynecologic Cytology Report                       Case: [id number]                                  Authorizing Provider:  Doroteo Avila MD         Collected:           06/10/2019 1104              Ordering Location:     [facility]:            06/10/2019 1104                                     [facility]                                                                               [place]                                                                    First Screen:          Lelo Holder                                                                  Rescreen:              SHERIE Mccabe                                                       Specimen:    LIQUID-BASED PAP, SCREENING, Cervix                                                        Primary Interpretation Negative for intraepithelial lesion or malignancy     Specimen Adequacy       Satisfactory for evaluation  Absence of endocervical/transformation zone component  Note               Additional Information       [employer] FDA approved ,  and ThinPrep Imaging Duo System are utilized with strict adherence to the 's instruction manual to prepare gynecologic and non-gynecologic cytology specimens for the production of ThinPrep slides as well as for gynecologic ThinPrep imaging   These processes have been validated by our laboratory and/or by the   The Pap test is not a diagnostic procedure and should not be used as the sole means to detect cervical cancer  It is only a screening procedure to aid in the detection of cervical cancer and its precursors  Both false-negative and false-positive results have been experienced  Your patient's test result should be interpreted in this context together with the history and clinical findings  Labs, Imaging, & Other studies:   All pertinent labs and imaging studies were personally reviewed      Reviewed and discussed with patient  Assessment and plan:  Reviewed notes of Dr Anahi Bernstein with the patient  Follow-up visit for hormone receptor positive( % and %) and HER2 negative by FISH, grade 2 invasive mammary carcinoma in the upper outer quadrant of left breast   There has been some discrepancy in the size  Patient will be going for lumpectomy in the near future  Date to be scheduled  Patient will come back to our office within 1 month post surgery  Patient states she will call for the appointment  Patient was taking Premarin for the last several years that she stopped recently because of breast cancer and she would not be going back to taking Premarin  She has been having slight clear discharge from the nipples for the last several years  She had polio when she was little and since then she has some dysphagia that is nothing new  She gives history of arthritis in her hips, fingers and ankles  History of lymphedema of left leg and she has compression stockings  Patient was 23 and she had her 1st child  Menstrual periods started age 4 and last menstrual period was in mid to late 45s  She had ovarian cancer surgery in 2005  Patient states she was checked for genetic mutation in 2013 and results are negative and she was recently checked again   She has 3 children    There is family history of breast cancer, lung cancer, stomach cancer and colon cancer ?, all on her mother side      Physical examination and test results are as recorded and discussed  Plan is as above and she will have surgery 1st and she is going for lumpectomy  She has not decided about radiation post lumpectomy  She will have adjuvant hormonal therapy  She had information on all 3 aromatase inhibitors and tamoxifen  We rediscussed the differences, mechanism of action, side effects and benefits  We discussed all that in detail, evidence based medicine  She would like to discuss more after surgery  She said she will call to make an appointment after surgery  All discussed in detail  Questions answered  Discussed the importance of self-breast examination, eating healthy foods, staying active and health screening tests  Patient is capable of self-care  Goal will be cure from breast cancer  1  Malignant neoplasm of upper-outer quadrant of left breast in female, estrogen receptor positive [facility])          Patient voiced understanding and agreement in the discussion  Counseling / Coordination of Care   Greater than 50% of total time was spent with the patient and / or family counseling and / or coordination of care

## 2019-07-12 NOTE — PROGRESS NOTES
HPI:  Follow-up visit for hormone receptor positive( % and %) and HER2 negative by FISH, grade 2 invasive mammary carcinoma in the upper outer quadrant of left breast   There has been some discrepancy in the size  Patient will be going for lumpectomy in the near future  Date to be scheduled  Patient will come back to our office within 1 month post surgery  Patient states she will call for the appointment  Patient was taking Premarin for the last several years that she stopped recently because of breast cancer and she would not be going back to taking Premarin  She has been having slight clear discharge from the nipples for the last several years  She had polio when she was little and since then she has some dysphagia that is nothing new  She gives history of arthritis in her hips, fingers and ankles  History of lymphedema of left leg and she has compression stockings  Patient was 23 and she had her 1st child  Menstrual periods started age 4 and last menstrual period was in mid to late 45s  She had ovarian cancer surgery in 2005  Patient states she was checked for genetic mutation in 2013 and results are negative and she was recently checked again   She has 3 children  There is family history of breast cancer, lung cancer, stomach cancer and colon cancer ?, all on her mother side              Current Outpatient Medications:     aspirin 81 MG tablet, Take by mouth, Disp: , Rfl:     Calcium Citrate-Vitamin D (CALCIUM CITRATE + D) 250-200 MG-UNIT TABS, Take by mouth, Disp: , Rfl:     HM VITAMIN D3 2000 units CAPS, Take by mouth, Disp: , Rfl:     multivitamin (THERAGRAN) TABS, Take 1 tablet by mouth, Disp: , Rfl:     pyridoxine (VITAMIN B6) 100 mg tablet, Take by mouth, Disp: , Rfl:     spironolactone (ALDACTONE) 50 mg tablet, , Disp: , Rfl:     Allergies   Allergen Reactions    Metoclopramide Myalgia     Reaction Date: 03Jun2011;  Annotation - [id number]: hand cramps and biting of cheeks          Malignant neoplasm of upper-outer quadrant of left breast in female, estrogen receptor positive ([facility] [address] )    5/30/2019 Initial Diagnosis     [facility] )      7/8/2019 -  Cancer Staged     Staging form: Breast, AJCC 8th Edition  - Clinical: Stage IB (cT2, cN0, cM0, G2, ER+, WV+, HER2-) - Signed by Isamar Barney MD on 7/8/2019  Laterality: Left  Method of lymph node assessment: Clinical  Histologic grading system: 3 grade system           ROS:  07/12/19 Reviewed 13 systems:  Presently no other neurological, cardiac, pulmonary, GI and  symptoms other than mentioned above  No other symptoms like fever, chills, bleeding, bone pains, skin rash, weight loss,  tiredness ,  weakness, numbness,  claudication and gait problem  No frequent infections  Not unusually sensitive to heat or cold  No swollen glands  Patient is anxious  Other symptoms are in HPI        /68 (BP Location: Left arm, Patient Position: Sitting, Cuff Size: Adult)   Pulse 79   Temp 98 1 °F (36 7 °C)   Resp 18   Ht 5' 3 5" (1 613 m)   Wt 64 kg (141 lb 3 2 oz)   SpO2 97%   BMI 24 62 kg/m²     Physical Exam:    Patient is alert and oriented  She is not in distress  Vital signs are as above  There is no scleral icterus, no oral thrush, no palpable neck mass, clear lung fields, regular heart rate, abdomen  soft and non tender, no palpable abdominal mass, no ascites, has lymphedema of left leg, no calf tenderness, no focal neurological deficit, no skin rash, no palpable lymphadenopathy in the neck and axillary areas, no clubbing  Patient is anxious  Performance status 1      IMAGING:  IMPRESSION:     No acute cardiopulmonary disease            Workstation performed: [id number]      Imaging     XR chest pa & lateral (Order: [id number]) - 7/5/2019       LABS:  Results for orders placed or performed in visit on 06/10/19   Liquid-based pap, screening   Result Value Ref Range    Case Report       Gynecologic Cytology Report                       Case: [id number]                                  Authorizing Provider:  Shira Rivera MD         Collected:           06/10/2019 1104              Ordering Location:     [facility]:            06/10/2019 1104                                     [facility]                                                                               [facility] Screen:          Dale Matthews                                                                  Rescreen:              Davina Rincon, CT                                                       Specimen:    LIQUID-BASED PAP, SCREENING, Cervix                                                        Primary Interpretation Negative for intraepithelial lesion or malignancy     Specimen Adequacy       Satisfactory for evaluation  Absence of endocervical/transformation zone component  Note               Additional Information       [employer]'s FDA approved ,  and ThinPrep Imaging Duo System are utilized with strict adherence to the 's instruction manual to prepare gynecologic and non-gynecologic cytology specimens for the production of ThinPrep slides as well as for gynecologic ThinPrep imaging  These processes have been validated by our laboratory and/or by the   The Pap test is not a diagnostic procedure and should not be used as the sole means to detect cervical cancer  It is only a screening procedure to aid in the detection of cervical cancer and its precursors  Both false-negative and false-positive results have been experienced  Your patient's test result should be interpreted in this context together with the history and clinical findings  Labs, Imaging, & Other studies:   All pertinent labs and imaging studies were personally reviewed      Reviewed and discussed with patient      Assessment and plan:  Reviewed notes of Dr Home Robbins with the patient  Follow-up visit for hormone receptor positive( % and %) and HER2 negative by FISH, grade 2 invasive mammary carcinoma in the upper outer quadrant of left breast   There has been some discrepancy in the size  Patient will be going for lumpectomy in the near future  Date to be scheduled  Patient will come back to our office within 1 month post surgery  Patient states she will call for the appointment  Patient was taking Premarin for the last several years that she stopped recently because of breast cancer and she would not be going back to taking Premarin  She has been having slight clear discharge from the nipples for the last several years  She had polio when she was little and since then she has some dysphagia that is nothing new  She gives history of arthritis in her hips, fingers and ankles  History of lymphedema of left leg and she has compression stockings  Patient was 23 and she had her 1st child  Menstrual periods started age 4 and last menstrual period was in mid to late 45s  She had ovarian cancer surgery in 2005  Patient states she was checked for genetic mutation in 2013 and results are negative and she was recently checked again   She has 3 children  There is family history of breast cancer, lung cancer, stomach cancer and colon cancer ?, all on her mother side  Nelson Roach Physical examination and test results are as recorded and discussed  Plan is as above and she will have surgery 1st and she is going for lumpectomy  She has not decided about radiation post lumpectomy  She will have adjuvant hormonal therapy  She had information on all 3 aromatase inhibitors and tamoxifen  We rediscussed the differences, mechanism of action, side effects and benefits  We discussed all that in detail, evidence based medicine  She would like to discuss more after surgery  She said she will call to make an appointment after surgery    All discussed in detail  Questions answered  Discussed the importance of self-breast examination, eating healthy foods, staying active and health screening tests  Patient is capable of self-care  Goal will be cure from breast cancer  1  Malignant neoplasm of upper-outer quadrant of left breast in female, estrogen receptor positive [facility])          Patient voiced understanding and agreement in the discussion  Counseling / Coordination of Care   Greater than 50% of total time was spent with the patient and / or family counseling and / or coordination of care

## 2019-07-15 ENCOUNTER — DOCUMENTATION (OUTPATIENT)
Dept: INFUSION CENTER | Facility: HOSPITAL | Age: 74
End: 2019-07-15

## 2019-07-15 NOTE — SOCIAL WORK
MSW received a Distress Thermometer from [facility], where the pt self scored a 2 to indicate her level of stress  She marked off worry as her psychosocial stressor and no physical stressors  Due to the pt's score, no further MSW intervention is warranted at this time  If the pt requests or is referred, a cancer counselor will be assigned to her while she is receiving her oncology care

## 2019-07-16 ENCOUNTER — DOCUMENTATION (OUTPATIENT)
Dept: SURGICAL ONCOLOGY | Facility: CLINIC | Age: 74
End: 2019-07-16

## 2019-07-16 NOTE — PROGRESS NOTES
The patient was scheduled to see me today to set up surgery for her breast cancer  She did not show up for her appointment  My staff called and left a message for her

## 2019-07-17 ENCOUNTER — OFFICE VISIT (OUTPATIENT)
Dept: SURGICAL ONCOLOGY | Facility: CLINIC | Age: 74
End: 2019-07-17
Payer: COMMERCIAL

## 2019-07-17 VITALS
WEIGHT: 140 LBS | HEART RATE: 80 BPM | BODY MASS INDEX: 23.9 KG/M2 | RESPIRATION RATE: 18 BRPM | HEIGHT: 64 IN | DIASTOLIC BLOOD PRESSURE: 88 MMHG | TEMPERATURE: 98.1 F | SYSTOLIC BLOOD PRESSURE: 120 MMHG

## 2019-07-17 DIAGNOSIS — Z13.71 BRCA NEGATIVE: ICD-10-CM

## 2019-07-17 DIAGNOSIS — Z17.0 MALIGNANT NEOPLASM OF UPPER-OUTER QUADRANT OF LEFT BREAST IN FEMALE, ESTROGEN RECEPTOR POSITIVE (HCC): Primary | ICD-10-CM

## 2019-07-17 DIAGNOSIS — C50.412 MALIGNANT NEOPLASM OF UPPER-OUTER QUADRANT OF LEFT BREAST IN FEMALE, ESTROGEN RECEPTOR POSITIVE (HCC): Primary | ICD-10-CM

## 2019-07-17 PROCEDURE — 99215 OFFICE O/P EST HI 40 MIN: CPT | Performed by: SURGERY

## 2019-07-17 RX ORDER — TRAMADOL HYDROCHLORIDE 50 MG/1
50 TABLET ORAL EVERY 8 HOURS PRN
Qty: 15 TABLET | Refills: 0 | Status: SHIPPED | OUTPATIENT
Start: 2019-07-17 | End: 2020-01-13 | Stop reason: ALTCHOICE

## 2019-07-17 RX ORDER — CEFAZOLIN SODIUM 1 G/50ML
1000 SOLUTION INTRAVENOUS ONCE
Status: CANCELLED | OUTPATIENT
Start: 2019-07-26 | End: 2019-07-17

## 2019-07-17 NOTE — H&P (VIEW-ONLY)
Surgical Oncology Follow Up       [address] [facility] SURGICAL ONCOLOGY [facility] [address]    Jude Joseph Bachelor  1945  [id number]  [address]  [facility]    Chief Complaint   Patient presents with    Follow-up       Assessment/Plan   Diagnoses and all orders for this visit:    Malignant neoplasm of upper-outer quadrant of left breast in female, estrogen receptor positive ([facility] [address] )  -     traMADol (ULTRAM) 50 mg tablet; Take 1 tablet (50 mg total) by mouth every 8 (eight) hours as needed for moderate pain    BRCA negative    Other orders  -     ceFAZolin (ANCEF) IVPB (premix) 1,000 mg        Advance Care Planning/Advance Directives:  Discussed disease status, cancer treatment plans and/or cancer treatment goals with the patient  Oncology History:       Malignant neoplasm of upper-outer quadrant of left breast in female, estrogen receptor positive ([facility] [address] )    5/30/2019 Initial Diagnosis     [facility]      7/8/2019 -  Cancer Staged     Staging form: Breast, AJCC 8th Edition  - Clinical: Stage IB (cT2, cN0, cM0, G2, ER+, GA+, HER2-) - Signed by Minda Daigle MD on 7/8/2019  Laterality: Left  Method of lymph node assessment: Clinical  Histologic grading system: 3 grade system           History of Present Illness:  Left breast cancer  -Interval History:  None    Review of Systems:  Review of Systems   Constitutional: Negative  Negative for appetite change and fever  Eyes: Negative  Respiratory: Negative for shortness of breath  Cardiovascular: Negative  Gastrointestinal: Negative  Endocrine: Negative  Genitourinary: Negative  Musculoskeletal: Negative  Negative for arthralgias and myalgias  Skin: Negative  Allergic/Immunologic: Negative  Neurological: Negative  Hematological: Negative  Negative for adenopathy  Does not bruise/bleed easily  Psychiatric/Behavioral: Negative          Patient Active Problem List   Diagnosis    Malignant neoplasm of upper-outer quadrant of left breast in female, estrogen receptor positive ([facility] [address] )    BRCA negative     Past Medical History:   Diagnosis Date    Basal cell carcinoma     Bulbar polio     Lymphedema     Osteopenia     Ovarian cancer ([facility] [address] )      Past Surgical History:   Procedure Laterality Date    ABDOMINAL HYSTERECTOMY N/A     ovarian cancer    BOWEL RESECTION      BREAST BIOPSY Left 2015    BREAST BIOPSY Right 2014    BREAST BIOPSY Left 2007    BREAST BIOPSY Left 2019    [facility]    CATARACT EXTRACTION Right      SECTION      COLONOSCOPY      HYSTERECTOMY      age 56    LYMPHADENECTOMY      SKIN CANCER EXCISION      US GUIDED BREAST BIOPSY LEFT COMPLETE Left 2019     Family History   Problem Relation Age of Onset    Heart failure Mother     Breast cancer Mother 78    Hypertension Father     Stroke Father     No Known Problems Brother     No Known Problems Brother     No Known Problems Daughter     No Known Problems Maternal Grandmother     Cancer Maternal Grandfather 61        chest and neck    No Known Problems Paternal Grandmother     Cancer Paternal Grandfather         unknown type and age   Gareth West No Known Problems Daughter     Stomach cancer Maternal Aunt 46    Colon cancer Maternal Uncle 71    Lung cancer Maternal Aunt 48     Social History     Socioeconomic History    Marital status: /Civil Union     Spouse name: Not on file    Number of children: Not on file    Years of education: Not on file    Highest education level: Not on file   Occupational History    Not on file   Social Needs    Financial resource strain: Not on file    Food insecurity:     Worry: Not on file     Inability: Not on file    Transportation needs:     Medical: Not on file     Non-medical: Not on file   Tobacco Use    Smoking status: Former Smoker    Smokeless tobacco: Never Used    Tobacco comment: Quit   Substance and Sexual Activity    Alcohol use: Yes     Comment: occasional    Drug use: No    Sexual activity: Yes     Partners: Male     Birth control/protection: Post-menopausal, Surgical     Comment:    Lifestyle    Physical activity:     Days per week: Not on file     Minutes per session: Not on file    Stress: Not on file   Relationships    Social connections:     Talks on phone: Not on file     Gets together: Not on file     Attends Pent[employer]al service: Not on file     Active member of club or organization: Not on file     Attends meetings of clubs or organizations: Not on file     Relationship status: Not on file    Intimate partner violence:     Fear of current or ex partner: Not on file     Emotionally abused: Not on file     Physically abused: Not on file     Forced sexual activity: Not on file   Other Topics Concern    Not on file   Social History Narrative    Not on file       Current Outpatient Medications:     aspirin 81 MG tablet, Take by mouth, Disp: , Rfl:     Calcium Citrate-Vitamin D (CALCIUM CITRATE + D) 250-200 MG-UNIT TABS, Take by mouth, Disp: , Rfl:     HM VITAMIN D3 2000 units CAPS, Take by mouth, Disp: , Rfl:     multivitamin (THERAGRAN) TABS, Take 1 tablet by mouth, Disp: , Rfl:     pyridoxine (VITAMIN B6) 100 mg tablet, Take by mouth, Disp: , Rfl:     spironolactone (ALDACTONE) 50 mg tablet, , Disp: , Rfl:     traMADol (ULTRAM) 50 mg tablet, Take 1 tablet (50 mg total) by mouth every 8 (eight) hours as needed for moderate pain, Disp: 15 tablet, Rfl: 0  Allergies   Allergen Reactions    Metoclopramide Myalgia     Reaction Date: 03Jun2011;  Annotation - [id number]: hand cramps and biting of cheeks       The following portions of the patient's history were reviewed and updated as appropriate: allergies, current medications, past family history, past medical history, past social history, past surgical history and problem list         Vitals:    07/17/19 0916   BP: 120/88   Pulse: 80   Resp: 18   Temp: 98 1 °F (36 7 °C)       Physical Exam   Constitutional: She appears well-developed and well-nourished  Cardiovascular: Normal heart sounds  Pulmonary/Chest: Breath sounds normal    Neurological: She is alert  Psychiatric: She has a normal mood and affect  Discussion/Summary:  Min Lucio returns today to discuss surgery  She has decided to proceed with surgery with me  She still had questions about lumpectomy versus mastectomy  I reviewed this with her again  She has ultimately decided to proceed with the lumpectomy  The area is slightly palpable however, given the associated calcifications I am recommending a needle localization to better obtain clean margins the day of surgery  I reviewed a needle localized lumpectomy of the left breast along with lymphatic mapping and sentinel node biopsy with her  She understands that her final pathology will dictate any need for radiation therapy  She already sees Dr Samy Harding and will follow up with him in the postoperative setting to discuss adjuvant medical therapy  All of her questions were answered today  Consent was signed in the office

## 2019-07-17 NOTE — PROGRESS NOTES
Surgical Oncology Follow Up       [address] [facility] SURGICAL ONCOLOGY [facility] [address]    Louisa Free Nidia Goltz  1945  [id number]  [address]  [facility]    Chief Complaint   Patient presents with    Follow-up       Assessment/Plan   Diagnoses and all orders for this visit:    Malignant neoplasm of upper-outer quadrant of left breast in female, estrogen receptor positive ([facility] [address] )  -     traMADol (ULTRAM) 50 mg tablet; Take 1 tablet (50 mg total) by mouth every 8 (eight) hours as needed for moderate pain    BRCA negative    Other orders  -     ceFAZolin (ANCEF) IVPB (premix) 1,000 mg        Advance Care Planning/Advance Directives:  Discussed disease status, cancer treatment plans and/or cancer treatment goals with the patient  Oncology History:       Malignant neoplasm of upper-outer quadrant of left breast in female, estrogen receptor positive ([facility] [address] )    5/30/2019 Initial Diagnosis     Breast cancer ([facility] [address] )      7/8/2019 -  Cancer Staged     Staging form: Breast, AJCC 8th Edition  - Clinical: Stage IB (cT2, cN0, cM0, G2, ER+, DC+, HER2-) - Signed by Jeancarlos Peña MD on 7/8/2019  Laterality: Left  Method of lymph node assessment: Clinical  Histologic grading system: 3 grade system           History of Present Illness:  Left breast cancer  -Interval History:  None    Review of Systems:  Review of Systems   Constitutional: Negative  Negative for appetite change and fever  Eyes: Negative  Respiratory: Negative for shortness of breath  Cardiovascular: Negative  Gastrointestinal: Negative  Endocrine: Negative  Genitourinary: Negative  Musculoskeletal: Negative  Negative for arthralgias and myalgias  Skin: Negative  Allergic/Immunologic: Negative  Neurological: Negative  Hematological: Negative  Negative for adenopathy  Does not bruise/bleed easily  Psychiatric/Behavioral: Negative          Patient Active Problem List   Diagnosis    Malignant neoplasm of upper-outer quadrant of left breast in female, estrogen receptor positive ([facility] [address] )    BRCA negative     Past Medical History:   Diagnosis Date    Basal cell carcinoma     Bulbar polio     Lymphedema     Osteopenia     Ovarian cancer ([facility] [address] )      Past Surgical History:   Procedure Laterality Date    ABDOMINAL HYSTERECTOMY N/A     ovarian cancer    BOWEL RESECTION      BREAST BIOPSY Left 2015    BREAST BIOPSY Right 2014    BREAST BIOPSY Left 2007    [facility] BIOPSY Left [facility]    [facility]    CATARACT EXTRACTION Right      SECTION      COLONOSCOPY      HYSTERECTOMY      age 56    LYMPHADENECTOMY      SKIN CANCER EXCISION      US GUIDED BREAST BIOPSY LEFT COMPLETE Left 2019     Family History   Problem Relation Age of Onset    Heart failure Mother     Breast cancer Mother 78    Hypertension Father     Stroke Father     No Known Problems Brother     No Known Problems Brother     No Known Problems Daughter     No Known Problems Maternal Grandmother     Cancer Maternal Grandfather 61        chest and neck    No Known Problems Paternal Grandmother     Cancer Paternal Grandfather         unknown type and age   Eva Monae No Known Problems Daughter     Stomach cancer Maternal Aunt 46    Colon cancer Maternal Uncle 71    Lung cancer Maternal Aunt 48     Social History     Socioeconomic History    Marital status: /Civil Union     Spouse name: Not on file    Number of children: Not on file    Years of education: Not on file    Highest education level: Not on file   Occupational History    Not on file   Social Needs    Financial resource strain: Not on file    Food insecurity:     Worry: Not on file     Inability: Not on file    Transportation needs:     Medical: Not on file     Non-medical: Not on file   Tobacco Use    Smoking status: Former Smoker    Smokeless tobacco: Never Used    Tobacco comment: Quit   Substance and Sexual Activity    Alcohol use: Yes     Comment: occasional    Drug use: No    Sexual activity: Yes     Partners: Male     Birth control/protection: Post-menopausal, Surgical     Comment:    Lifestyle    Physical activity:     Days per week: Not on file     Minutes per session: Not on file    Stress: Not on file   Relationships    Social connections:     Talks on phone: Not on file     Gets together: Not on file     Attends Christianity service: Not on file     Active member of club or organization: Not on file     Attends meetings of clubs or organizations: Not on file     Relationship status: Not on file    Intimate partner violence:     Fear of current or ex partner: Not on file     Emotionally abused: Not on file     Physically abused: Not on file     Forced sexual activity: Not on file   Other Topics Concern    Not on file   Social History Narrative    Not on file       Current Outpatient Medications:     aspirin 81 MG tablet, Take by mouth, Disp: , Rfl:     Calcium Citrate-Vitamin D (CALCIUM CITRATE + D) 250-200 MG-UNIT TABS, Take by mouth, Disp: , Rfl:     HM VITAMIN D3 2000 units CAPS, Take by mouth, Disp: , Rfl:     multivitamin (THERAGRAN) TABS, Take 1 tablet by mouth, Disp: , Rfl:     pyridoxine (VITAMIN B6) 100 mg tablet, Take by mouth, Disp: , Rfl:     spironolactone (ALDACTONE) 50 mg tablet, , Disp: , Rfl:     traMADol (ULTRAM) 50 mg tablet, Take 1 tablet (50 mg total) by mouth every 8 (eight) hours as needed for moderate pain, Disp: 15 tablet, Rfl: 0  Allergies   Allergen Reactions    Metoclopramide Myalgia     Reaction Date: 03Jun2011;  Annotation - [id number]: hand cramps and biting of cheeks       The following portions of the patient's history were reviewed and updated as appropriate: allergies, current medications, past family history, past medical history, past social history, past surgical history and problem list         Vitals:    07/17/19 0916   BP: 120/88   Pulse: 80   Resp: 18   Temp: 98 1 °F (36 7 °C)       Physical Exam   Constitutional: She appears well-developed and well-nourished  Cardiovascular: Normal heart sounds  Pulmonary/Chest: Breath sounds normal    Neurological: She is alert  Psychiatric: She has a normal mood and affect  Discussion/Summary:  Artem Rubio returns today to discuss surgery  She has decided to proceed with surgery with me  She still had questions about lumpectomy versus mastectomy  I reviewed this with her again  She has ultimately decided to proceed with the lumpectomy  The area is slightly palpable however, given the associated calcifications I am recommending a needle localization to better obtain clean margins the day of surgery  I reviewed a needle localized lumpectomy of the left breast along with lymphatic mapping and sentinel node biopsy with her  She understands that her final pathology will dictate any need for radiation therapy  She already sees Dr Eliezer Gaspar and will follow up with him in the postoperative setting to discuss adjuvant medical therapy  All of her questions were answered today  Consent was signed in the office

## 2019-07-19 ENCOUNTER — TELEPHONE (OUTPATIENT)
Dept: SURGICAL ONCOLOGY | Facility: CLINIC | Age: 74
End: 2019-07-19

## 2019-07-19 NOTE — TELEPHONE ENCOUNTER
Pt called office with concerns of right wrist pain she has developed recently  She questioned what medications she could take during this prior week to surgery  Discussed this with her and shared she could take acetaminophen during this time frame  Following surgery she may use ibuprofen, Aleve or her script pain medication   She verbalized understanding

## 2019-07-21 ENCOUNTER — ANESTHESIA EVENT (OUTPATIENT)
Dept: PERIOP | Facility: HOSPITAL | Age: 74
End: 2019-07-21
Payer: COMMERCIAL

## 2019-07-21 RX ORDER — SODIUM CHLORIDE 9 MG/ML
125 INJECTION, SOLUTION INTRAVENOUS CONTINUOUS
Status: CANCELLED | OUTPATIENT
Start: 2019-07-26

## 2019-07-22 ENCOUNTER — HOSPITAL ENCOUNTER (OUTPATIENT)
Dept: NON INVASIVE DIAGNOSTICS | Facility: HOSPITAL | Age: 74
Discharge: HOME | End: 2019-07-22
Attending: SURGERY
Payer: COMMERCIAL

## 2019-07-22 ENCOUNTER — APPOINTMENT (OUTPATIENT)
Dept: PREADMISSION TESTING | Facility: HOSPITAL | Age: 74
End: 2019-07-22
Payer: COMMERCIAL

## 2019-07-22 ENCOUNTER — APPOINTMENT (OUTPATIENT)
Dept: LAB | Facility: HOSPITAL | Age: 74
End: 2019-07-22
Attending: SURGERY
Payer: COMMERCIAL

## 2019-07-22 DIAGNOSIS — C50.412 MALIGNANT NEOPLASM OF UPPER-OUTER QUADRANT OF LEFT BREAST IN FEMALE, ESTROGEN RECEPTOR POSITIVE (HCC): ICD-10-CM

## 2019-07-22 DIAGNOSIS — Z17.0 MALIGNANT NEOPLASM OF UPPER-OUTER QUADRANT OF LEFT BREAST IN FEMALE, ESTROGEN RECEPTOR POSITIVE (HCC): ICD-10-CM

## 2019-07-22 LAB
ALBUMIN SERPL BCP-MCNC: 4 G/DL (ref 3.5–5)
ALP SERPL-CCNC: 66 U/L (ref 46–116)
ALT SERPL W P-5'-P-CCNC: 39 U/L (ref 12–78)
ANION GAP SERPL CALCULATED.3IONS-SCNC: 4 MMOL/L (ref 4–13)
APTT PPP: 35 SECONDS (ref 23–37)
AST SERPL W P-5'-P-CCNC: 34 U/L (ref 5–45)
ATRIAL RATE: 72 BPM
BASOPHILS # BLD AUTO: 0.03 THOUSANDS/ΜL (ref 0–0.1)
BASOPHILS NFR BLD AUTO: 1 % (ref 0–1)
BILIRUB SERPL-MCNC: 0.47 MG/DL (ref 0.2–1)
BILIRUB UR QL STRIP: NEGATIVE
BUN SERPL-MCNC: 27 MG/DL (ref 5–25)
CALCIUM SERPL-MCNC: 10 MG/DL (ref 8.3–10.1)
CHLORIDE SERPL-SCNC: 103 MMOL/L (ref 100–108)
CLARITY UR: NORMAL
CO2 SERPL-SCNC: 33 MMOL/L (ref 21–32)
COLOR UR: YELLOW
CREAT SERPL-MCNC: 0.83 MG/DL (ref 0.6–1.3)
EOSINOPHIL # BLD AUTO: 0.14 THOUSAND/ΜL (ref 0–0.61)
EOSINOPHIL NFR BLD AUTO: 2 % (ref 0–6)
ERYTHROCYTE [DISTWIDTH] IN BLOOD BY AUTOMATED COUNT: 12.3 % (ref 11.6–15.1)
GFR SERPL CREATININE-BSD FRML MDRD: 69 ML/MIN/1.73SQ M
GLUCOSE SERPL-MCNC: 82 MG/DL (ref 65–140)
GLUCOSE UR STRIP-MCNC: NEGATIVE MG/DL
HCT VFR BLD AUTO: 44.7 % (ref 34.8–46.1)
HGB BLD-MCNC: 14 G/DL (ref 11.5–15.4)
HGB UR QL STRIP.AUTO: NEGATIVE
IMM GRANULOCYTES # BLD AUTO: 0.02 THOUSAND/UL (ref 0–0.2)
IMM GRANULOCYTES NFR BLD AUTO: 0 % (ref 0–2)
INR PPP: 0.98 (ref 0.84–1.19)
KETONES UR STRIP-MCNC: NEGATIVE MG/DL
LEUKOCYTE ESTERASE UR QL STRIP: NEGATIVE
LYMPHOCYTES # BLD AUTO: 0.97 THOUSANDS/ΜL (ref 0.6–4.47)
LYMPHOCYTES NFR BLD AUTO: 15 % (ref 14–44)
MCH RBC QN AUTO: 31.3 PG (ref 26.8–34.3)
MCHC RBC AUTO-ENTMCNC: 31.3 G/DL (ref 31.4–37.4)
MCV RBC AUTO: 100 FL (ref 82–98)
MONOCYTES # BLD AUTO: 0.67 THOUSAND/ΜL (ref 0.17–1.22)
MONOCYTES NFR BLD AUTO: 10 % (ref 4–12)
NEUTROPHILS # BLD AUTO: 4.82 THOUSANDS/ΜL (ref 1.85–7.62)
NEUTS SEG NFR BLD AUTO: 72 % (ref 43–75)
NITRITE UR QL STRIP: NEGATIVE
NRBC BLD AUTO-RTO: 0 /100 WBCS
P AXIS: 72 DEGREES
PH UR STRIP.AUTO: 6.5 [PH]
PLATELET # BLD AUTO: 270 THOUSANDS/UL (ref 149–390)
PMV BLD AUTO: 10.4 FL (ref 8.9–12.7)
POTASSIUM SERPL-SCNC: 5.3 MMOL/L (ref 3.5–5.3)
PR INTERVAL: 128 MS
PROT SERPL-MCNC: 7.6 G/DL (ref 6.4–8.2)
PROT UR STRIP-MCNC: NEGATIVE MG/DL
PROTHROMBIN TIME: 13.1 SECONDS (ref 11.6–14.5)
QRS AXIS: -10 DEGREES
QRSD INTERVAL: 82 MS
QT INTERVAL: 360 MS
QTC INTERVAL: 394 MS
RBC # BLD AUTO: 4.48 MILLION/UL (ref 3.81–5.12)
SODIUM SERPL-SCNC: 140 MMOL/L (ref 136–145)
SP GR UR STRIP.AUTO: 1.01 (ref 1–1.03)
T WAVE AXIS: 31 DEGREES
UROBILINOGEN UR QL STRIP.AUTO: 0.2 E.U./DL
VENTRICULAR RATE: 72 BPM
WBC # BLD AUTO: 6.65 THOUSAND/UL (ref 4.31–10.16)

## 2019-07-22 PROCEDURE — 80053 COMPREHEN METABOLIC PANEL: CPT

## 2019-07-22 PROCEDURE — 93005 ELECTROCARDIOGRAM TRACING: CPT

## 2019-07-22 PROCEDURE — 85730 THROMBOPLASTIN TIME PARTIAL: CPT

## 2019-07-22 PROCEDURE — 36415 COLL VENOUS BLD VENIPUNCTURE: CPT

## 2019-07-22 PROCEDURE — 81003 URINALYSIS AUTO W/O SCOPE: CPT | Performed by: SURGERY

## 2019-07-22 PROCEDURE — 85025 COMPLETE CBC W/AUTO DIFF WBC: CPT

## 2019-07-22 PROCEDURE — 93010 ELECTROCARDIOGRAM REPORT: CPT | Performed by: INTERNAL MEDICINE

## 2019-07-22 PROCEDURE — 85610 PROTHROMBIN TIME: CPT

## 2019-07-22 NOTE — PRE-PROCEDURE INSTRUCTIONS
Pre-Surgery Instructions:   Medication Instructions    aspirin 81 MG tablet Patient was instructed by Physician and understands   Calcium Citrate-Vitamin D (CALCIUM CITRATE + D) 250-200 MG-UNIT TABS Patient was instructed by Physician and understands    VITAMIN D3 2000 units CAPS Patient was instructed by Physician and understands   multivitamin [facility]) TABS Patient was instructed by Physician and understands   pyridoxine (VITAMIN B6) 100 mg tablet Patient was instructed by Physician and understands   spironolactone (ALDACTONE) 50 mg tablet Patient was instructed by Physician and understands    Patient given/ instructed on use of chlorhexidine soap per hospital protocol    Patient instructed to stop all ASA, NSAIDS, vitamins and herbal supplements one week prior to surgery or per Dr Tammy Botello

## 2019-07-22 NOTE — ANESTHESIA PREPROCEDURE EVALUATION
Review of Systems/Medical History  Patient summary reviewed  Chart reviewed  No history of anesthetic complications     Cardiovascular  Negative cardio ROS    Pulmonary  Negative pulmonary ROS        GI/Hepatic  Dysphagia (Occ'l dysphagia from polio),     Comment: HCP     Negative  ROS        Endo/Other  History of thyroid disease (nodule) ,   Comment: [facility] LLE post pelvic LND    GYN    Hysterectomy, Ovarian cancer, Breast cancer        Hematology      Comment: Hx BCC Musculoskeletal  Scoliosis ,        Neurology    Visual impairment (glaucoma OU--"slight"),   Comment: Hx polio w/ residual mm weakness  Psychology   Negative psychology ROS              Physical Exam    Airway    Mallampati score: II  TM Distance: >3 FB  Neck ROM: full     Dental   No notable dental hx     Cardiovascular  Comment: Negative ROS, Rhythm: regular, Rate: normal, Cardiovascular exam normal    Pulmonary  Pulmonary exam normal Breath sounds clear to auscultation,     Other Findings        Anesthesia Plan  ASA Score- 2     Anesthesia Type- general with ASA Monitors  Additional Monitors:   Airway Plan: LMA  Plan Factors-Patient not instructed to abstain from smoking on day of procedure  Patient did not smoke on day of surgery  Induction- intravenous  Postoperative Plan-     Informed Consent- Anesthetic plan and risks discussed with patient

## 2019-07-26 ENCOUNTER — ANESTHESIA (OUTPATIENT)
Dept: PERIOP | Facility: HOSPITAL | Age: 74
End: 2019-07-26
Payer: COMMERCIAL

## 2019-07-26 ENCOUNTER — HOSPITAL ENCOUNTER (OUTPATIENT)
Facility: HOSPITAL | Age: 74
Setting detail: OUTPATIENT SURGERY
Discharge: HOME | End: 2019-07-26
Attending: SURGERY | Admitting: SURGERY
Payer: COMMERCIAL

## 2019-07-26 ENCOUNTER — HOSPITAL ENCOUNTER (OUTPATIENT)
Dept: MAMMOGRAPHY | Facility: HOSPITAL | Age: 74
Discharge: HOME | End: 2019-07-26
Attending: SURGERY
Payer: COMMERCIAL

## 2019-07-26 ENCOUNTER — HOSPITAL ENCOUNTER (OUTPATIENT)
Dept: MAMMOGRAPHY | Facility: HOSPITAL | Age: 74
Setting detail: OUTPATIENT SURGERY
Discharge: HOME | End: 2019-07-26
Payer: COMMERCIAL

## 2019-07-26 ENCOUNTER — HOSPITAL ENCOUNTER (OUTPATIENT)
Dept: NUCLEAR MEDICINE | Facility: HOSPITAL | Age: 74
Discharge: HOME | End: 2019-07-26
Attending: SURGERY
Payer: COMMERCIAL

## 2019-07-26 VITALS
DIASTOLIC BLOOD PRESSURE: 81 MMHG | OXYGEN SATURATION: 95 % | RESPIRATION RATE: 18 BRPM | SYSTOLIC BLOOD PRESSURE: 150 MMHG | HEART RATE: 86 BPM | WEIGHT: 139.2 LBS | BODY MASS INDEX: 24.66 KG/M2 | TEMPERATURE: 98.6 F | HEIGHT: 63 IN

## 2019-07-26 DIAGNOSIS — C50.412 MALIGNANT NEOPLASM OF UPPER-OUTER QUADRANT OF LEFT BREAST IN FEMALE, ESTROGEN RECEPTOR POSITIVE (HCC): ICD-10-CM

## 2019-07-26 DIAGNOSIS — Z17.0 MALIGNANT NEOPLASM OF UPPER-OUTER QUADRANT OF LEFT BREAST IN FEMALE, ESTROGEN RECEPTOR POSITIVE (HCC): ICD-10-CM

## 2019-07-26 PROCEDURE — 88341 IMHCHEM/IMCYTCHM EA ADD ANTB: CPT | Performed by: PATHOLOGY

## 2019-07-26 PROCEDURE — 78195 LYMPH SYSTEM IMAGING: CPT

## 2019-07-26 PROCEDURE — 88342 IMHCHEM/IMCYTCHM 1ST ANTB: CPT | Performed by: PATHOLOGY

## 2019-07-26 PROCEDURE — 88307 TISSUE EXAM BY PATHOLOGIST: CPT | Performed by: PATHOLOGY

## 2019-07-26 PROCEDURE — 19281 PERQ DEVICE BREAST 1ST IMAG: CPT

## 2019-07-26 PROCEDURE — 19301 PARTIAL MASTECTOMY: CPT | Performed by: SURGERY

## 2019-07-26 PROCEDURE — 38525 BIOPSY/REMOVAL LYMPH NODES: CPT | Performed by: SURGERY

## 2019-07-26 PROCEDURE — A9541 TC99M SULFUR COLLOID: HCPCS

## 2019-07-26 PROCEDURE — 38900 IO MAP OF SENT LYMPH NODE: CPT | Performed by: SURGERY

## 2019-07-26 PROCEDURE — 88305 TISSUE EXAM BY PATHOLOGIST: CPT | Performed by: PATHOLOGY

## 2019-07-26 RX ORDER — ISOSULFAN BLUE 50 MG/5ML
INJECTION, SOLUTION SUBCUTANEOUS AS NEEDED
Status: DISCONTINUED | OUTPATIENT
Start: 2019-07-26 | End: 2019-07-26 | Stop reason: HOSPADM

## 2019-07-26 RX ORDER — EPHEDRINE SULFATE 50 MG/ML
INJECTION INTRAVENOUS AS NEEDED
Status: DISCONTINUED | OUTPATIENT
Start: 2019-07-26 | End: 2019-07-26 | Stop reason: SURG

## 2019-07-26 RX ORDER — ONDANSETRON 2 MG/ML
INJECTION INTRAMUSCULAR; INTRAVENOUS AS NEEDED
Status: DISCONTINUED | OUTPATIENT
Start: 2019-07-26 | End: 2019-07-26 | Stop reason: SURG

## 2019-07-26 RX ORDER — MEPERIDINE HYDROCHLORIDE 50 MG/ML
12.5 INJECTION INTRAMUSCULAR; INTRAVENOUS; SUBCUTANEOUS AS NEEDED
Status: DISCONTINUED | OUTPATIENT
Start: 2019-07-26 | End: 2019-07-26 | Stop reason: HOSPADM

## 2019-07-26 RX ORDER — IBUPROFEN 600 MG/1
600 TABLET ORAL EVERY 6 HOURS PRN
Status: DISCONTINUED | OUTPATIENT
Start: 2019-07-26 | End: 2019-07-26 | Stop reason: HOSPADM

## 2019-07-26 RX ORDER — FENTANYL CITRATE/PF 50 MCG/ML
25 SYRINGE (ML) INJECTION
Status: DISCONTINUED | OUTPATIENT
Start: 2019-07-26 | End: 2019-07-26 | Stop reason: HOSPADM

## 2019-07-26 RX ORDER — SODIUM CHLORIDE 9 MG/ML
125 INJECTION, SOLUTION INTRAVENOUS CONTINUOUS
Status: DISCONTINUED | OUTPATIENT
Start: 2019-07-26 | End: 2019-07-26 | Stop reason: HOSPADM

## 2019-07-26 RX ORDER — BUPIVACAINE HYDROCHLORIDE 5 MG/ML
INJECTION, SOLUTION PERINEURAL AS NEEDED
Status: DISCONTINUED | OUTPATIENT
Start: 2019-07-26 | End: 2019-07-26 | Stop reason: HOSPADM

## 2019-07-26 RX ORDER — TRAMADOL HYDROCHLORIDE 50 MG/1
50 TABLET ORAL EVERY 6 HOURS PRN
Status: DISCONTINUED | OUTPATIENT
Start: 2019-07-26 | End: 2019-07-26 | Stop reason: HOSPADM

## 2019-07-26 RX ORDER — MAGNESIUM HYDROXIDE 1200 MG/15ML
LIQUID ORAL AS NEEDED
Status: DISCONTINUED | OUTPATIENT
Start: 2019-07-26 | End: 2019-07-26 | Stop reason: HOSPADM

## 2019-07-26 RX ORDER — FENTANYL CITRATE 50 UG/ML
INJECTION, SOLUTION INTRAMUSCULAR; INTRAVENOUS AS NEEDED
Status: DISCONTINUED | OUTPATIENT
Start: 2019-07-26 | End: 2019-07-26 | Stop reason: SURG

## 2019-07-26 RX ORDER — LIDOCAINE WITH 8.4% SOD BICARB 0.9%(10ML)
5 SYRINGE (ML) INJECTION ONCE
Status: COMPLETED | OUTPATIENT
Start: 2019-07-26 | End: 2019-07-26

## 2019-07-26 RX ORDER — DEXAMETHASONE SODIUM PHOSPHATE 4 MG/ML
INJECTION, SOLUTION INTRA-ARTICULAR; INTRALESIONAL; INTRAMUSCULAR; INTRAVENOUS; SOFT TISSUE AS NEEDED
Status: DISCONTINUED | OUTPATIENT
Start: 2019-07-26 | End: 2019-07-26 | Stop reason: SURG

## 2019-07-26 RX ORDER — PROPOFOL 10 MG/ML
INJECTION, EMULSION INTRAVENOUS AS NEEDED
Status: DISCONTINUED | OUTPATIENT
Start: 2019-07-26 | End: 2019-07-26 | Stop reason: SURG

## 2019-07-26 RX ORDER — ALBUTEROL SULFATE 2.5 MG/3ML
2.5 SOLUTION RESPIRATORY (INHALATION) ONCE AS NEEDED
Status: DISCONTINUED | OUTPATIENT
Start: 2019-07-26 | End: 2019-07-26 | Stop reason: HOSPADM

## 2019-07-26 RX ORDER — CEFAZOLIN SODIUM 1 G/50ML
1000 SOLUTION INTRAVENOUS ONCE
Status: COMPLETED | OUTPATIENT
Start: 2019-07-26 | End: 2019-07-26

## 2019-07-26 RX ADMIN — SODIUM CHLORIDE 125 ML/HR: 0.9 INJECTION, SOLUTION INTRAVENOUS at 10:35

## 2019-07-26 RX ADMIN — Medication 5 ML: at 11:48

## 2019-07-26 RX ADMIN — PHENYLEPHRINE HYDROCHLORIDE 50 MCG: 10 INJECTION INTRAVENOUS at 13:27

## 2019-07-26 RX ADMIN — EPHEDRINE SULFATE 10 MG: 50 INJECTION, SOLUTION INTRAVENOUS at 13:45

## 2019-07-26 RX ADMIN — PHENYLEPHRINE HYDROCHLORIDE 50 MCG: 10 INJECTION INTRAVENOUS at 13:40

## 2019-07-26 RX ADMIN — DEXAMETHASONE SODIUM PHOSPHATE 4 MG: 4 INJECTION, SOLUTION INTRAMUSCULAR; INTRAVENOUS at 12:59

## 2019-07-26 RX ADMIN — CEFAZOLIN SODIUM 1000 MG: 1 SOLUTION INTRAVENOUS at 12:49

## 2019-07-26 RX ADMIN — FENTANYL CITRATE 25 MCG: 50 INJECTION, SOLUTION INTRAMUSCULAR; INTRAVENOUS at 13:11

## 2019-07-26 RX ADMIN — SODIUM CHLORIDE: 0.9 INJECTION, SOLUTION INTRAVENOUS at 13:24

## 2019-07-26 RX ADMIN — PHENYLEPHRINE HYDROCHLORIDE 50 MCG: 10 INJECTION INTRAVENOUS at 13:16

## 2019-07-26 RX ADMIN — PROPOFOL 150 MG: 10 INJECTION, EMULSION INTRAVENOUS at 12:59

## 2019-07-26 RX ADMIN — LIDOCAINE HYDROCHLORIDE 80 MG: 20 INJECTION, SOLUTION INTRAVENOUS at 12:59

## 2019-07-26 RX ADMIN — ONDANSETRON 4 MG: 2 INJECTION INTRAMUSCULAR; INTRAVENOUS at 13:19

## 2019-07-26 RX ADMIN — PHENYLEPHRINE HYDROCHLORIDE 50 MCG: 10 INJECTION INTRAVENOUS at 13:23

## 2019-07-26 NOTE — DISCHARGE INSTRUCTIONS
POST-OPERATIVE CARE INSTRUCTIONS       Care after your procedure:   General  · Rest and relax for 24 hours, then gradually return to normal activities  · Do not preform any heavy lifting or strenuous physical activities for 14 days  · Your activity restrictions will be re-evaluated at your post op visit  · Drink clear liquids until you are certain there is no nausea, then resume a normal diet  · Do not drink alcohol, drive any vehicle, operate mechanical equipment or make critical decisions for at least 24 hours and until you are off any narcotic pain medications  The Incision  · Your incision is closed with:   dissolvable stiches just underneath the skin  · The incision is also covered with:                          clear waterproof glue  · A gauze-pad is covering the wound  Wound care  · Remove your gauze-pad after 24 hours  · You may then shower using soap and water to clean your incision  Gently dry the wound  · You may redress your wound with additional gauze and tape if you choose  · A little bruising at the wound site is normal   Medication  · Resume all previous medications  · Take either Naproxen (Aleve) one tablet every 8 hours or Ibuprofen(Advil/Motrin) one(1) to two(2) tablets every 6 hours around the clock for the first 2-3 days  Take this even if you don't think you need it for at least the first 24 hours  · Pain Medication Instructions: tramadol as prescribed, may use over the counter tylenol instead          Other (If applicable)  · Wear a post-surgical bra around the clock  · May use ice to the incision site(s) for the next 24-48 hours, twice daily     Call your  doctor if you have any of the following:  · Redness, swelling, heat, drainage, and/or bleeding from your wound  · Chills or fever ( above 101' F )  · Pain, not relieved with the above medications  · If you have any questions or problems call our office [phone]    Follow-up appointment:  · As scheduled

## 2019-07-26 NOTE — OP NOTE
OPERATIVE REPORT  PATIENT NAME: Elayne Akins    :  1945  MRN: [id number]  Pt Location: [facility] OR ROOM 05    SURGERY DATE: 2019    Surgeon(s) and Role:     * Surinder Rudd MD - Primary    Preop Diagnosis:  Malignant neoplasm of upper-outer quadrant of left breast in female, estrogen receptor positive ([facility] [address] ) [C50 412, Z17 0]    Post-Op Diagnosis Codes:     * Malignant neoplasm of upper-outer quadrant of left breast in female, estrogen receptor positive ([facility] [address] ) [C50 412, Z17 0]    Procedure(s) (LRB):  LUMPECTOMY BREAST NEEDLE LOC at 1100 (Left)  SENTINEL NODE BIOPSY- NUC MED INJ at 1200 (Left)   Injection of blue dye  Use of gamma probe    Specimen(s):  ID Type Source Tests Collected by Time Destination   1 : LEFT BREAST LUMPECTOMY STITCH MARKS SHORT SUPERIOR LONG LATERAL  Tissue Breast, Left TISSUE EXAM Surinder Rudd MD 2019 1319    2 : LEFT BREAST LUMPECTOMY NEW SUPERIOR MARGIN STITCH MARKS TRUE MARGIN Tissue Breast, Left TISSUE EXAM Surinder Rudd MD 2019 1324    3 : LEFT BREAST LUMPECTOMY NEW MEDIAL MARGIN STITCH MARKS TRUE MARGIN Tissue Breast, Left TISSUE EXAM Surinder Rudd MD 2019 1324    4 : LEFT BREAST LUMPECTOMY NEW INFERIOR MARGIN STITCH TOPETE TRUE MARGIN Tissue Breast, Left TISSUE EXAM Surinder Rudd MD 2019 1325    5 : LEFT BREAST LUMPECTOMY NEW LATERAL MARGIN STITCH MARKS TRUE MARGIN Tissue Breast, Left TISSUE EXAM Surinder Rudd MD 2019 1325    6 : LEFT BREAST LUMPECTOMY NEW POSTERIOR MARGIN Tissue Breast, Left TISSUE EXAM Surinder Rudd MD 2019 1327    7 : SENTINEL NODE #1 LEFT AXILLA  Tissue Lymph Node, [place] TISSUE EXAM Surinder Rudd MD 2019 1319    8 : SENTINEL NODE #2 LEFT AXILLA  Tissue Lymph Node, [place] TISSUE EXAM Surinder Rudd MD 2019 1335    9 : SENTINEL NODE #3 LEFT AXILLA  Tissue Lymph Node, [place] TISSUE EXAM Surinder Rudd MD 2019 1336        Estimated Blood Loss:   Minimal    Drains:  * No LDAs found *    Anesthesia Type: General    Operative Indications:  Malignant neoplasm of upper-outer quadrant of left breast in female, estrogen receptor positive ([facility] [address] ) [C50 412, Z17 0]      Operative Findings:  Clip and wire in specimen    Complications:   None    Procedure and Technique:  Mari Mora is a 49-year-old female who was diagnosed with a left breast carcinoma  She sought a 2nd opinion from me and decided to proceed with her care with me  She elected to proceed with breast conservation  She was counseled on a left lumpectomy with lymphatic mapping and sentinel node biopsy  Her tumor was palpable in nature; however, there were associated calcifications in the area that were likely malignant and responsible for the extent of disease seen on her breast MRI  I therefore recommended needle localization to better define the extent of disease for surgical resection  She presented the day of surgery to the radiology suite  She underwent needle localization of the left breast along with lymphoscintigraphy  She then went to the operating room  She was given general anesthesia  She had 5 ccs of Lymphazurin blue dye injected into the left subareolar plexus  She was prepped and draped in the usual standard fashion  Antibiotics were given  Time-out was performed  Attention was turned to the upper outer left breast   0 5% Marcaine plain, 10 cc was used for supplemental anesthesia  A broad elliptical incision was created around the palpable mass as well as localization wire  Dissection was carried out superior, lateral, inferior, medial and posterior down to and including a small portion of the underlying muscle  This was marked with a short stitch superior and a long stitch lateral   The specimen was then imaged in the operating room  The prior biopsy clip as well as intact wire were present along with extensive calcification  The closest margins were superior, medial, inferior, lateral, and posterior    New margins were therefore excised in these locations  Sutures were placed on all of the new margins with the exception of the posterior which was a portion of additional muscle  All breast specimens were sent to pathology in formalin  Attention was then turned to the left axilla  Given the proximity to the axilla from her lumpectomy wound, the sentinel node biopsy was approached from the same location  Electrocautery was used to open the clavipectoral fascia to enter the axillary fat pad  There was a blue lymphatic channel that traced to a radioactive node in the medial aspect of the axilla  There were two additional radioactive nodes higher and more lateral in the axilla  These were excised as well  Several hemoclips were used in this portion of the dissection  The lymph nodes were submitted to pathology in formalin as sentinel node one, two and three of the left axilla  Following removal of the 3rd node, there were no additional blue-stained, radioactive or palpable nodes  Her wound was irrigated and hemostasis was achieved  Hemoclips were also placed along the muscle base where the tumor was resected  Her incision was then closed with multiple interrupted 3-0 Monocryl sutures and a running 4-0 Monocryl subcuticular stitch  The skin was cleaned and dried  Surgical glue, fluffs and a bra were applied  All counts were correct  The patient was extubated taken recovery in stable condition        Patient Disposition:  extubated and stable    SIGNATURE: Nidia Dean MD  DATE: July 26, 2019  TIME: 2:13 PM

## 2019-08-12 ENCOUNTER — OFFICE VISIT (OUTPATIENT)
Dept: SURGICAL ONCOLOGY | Facility: CLINIC | Age: 74
End: 2019-08-12

## 2019-08-12 VITALS
HEART RATE: 84 BPM | BODY MASS INDEX: 24.52 KG/M2 | SYSTOLIC BLOOD PRESSURE: 110 MMHG | WEIGHT: 138.4 LBS | TEMPERATURE: 97.6 F | HEIGHT: 63 IN | RESPIRATION RATE: 16 BRPM | DIASTOLIC BLOOD PRESSURE: 80 MMHG

## 2019-08-12 DIAGNOSIS — C50.412 MALIGNANT NEOPLASM OF UPPER-OUTER QUADRANT OF LEFT BREAST IN FEMALE, ESTROGEN RECEPTOR POSITIVE (HCC): Primary | ICD-10-CM

## 2019-08-12 DIAGNOSIS — Z17.0 MALIGNANT NEOPLASM OF UPPER-OUTER QUADRANT OF LEFT BREAST IN FEMALE, ESTROGEN RECEPTOR POSITIVE (HCC): Primary | ICD-10-CM

## 2019-08-12 PROCEDURE — 99024 POSTOP FOLLOW-UP VISIT: CPT | Performed by: SURGERY

## 2019-08-12 NOTE — PROGRESS NOTES
74 y o  female is here today s/p left breast lumpectomy, SLNB on 19  She reports feeling mild discomfort at surgical site  She experiences numbness from her axilla down to her elbow  Her incision line has irritation at both ends of incision and a light pink discoloration to skin of breast        Physical Exam   Constitutional: She appears well-developed and well-nourished  Pulmonary/Chest: Left breast exhibits skin change (Well-healing incision in the upper outer left breast with mild irritation, fullness of the breast)  Neurological: She is alert  Psychiatric: She has a normal mood and affect  Data:       Stagin 7 cm invasive ductal  Tumor grade two  LVI implied  Margins clean  Estrogen receptor and progesterone receptor status positive  HER2 status and test method negative    Lymph node assessment/status one lymph node with a 2 1 mm focus, no extranodal extension      Neoadjuvant therapy:  No  Stage: IB prognostic        Diagnoses and all orders for this visit:    Malignant neoplasm of upper-outer quadrant of left breast in female, estrogen receptor positive ([facility] [address] )  -     Ambulatory referral to Hematology / Oncology; Future  -     Ambulatory referral to Radiation Oncology; Future        Assessment/Plan:  75-year-old female status post left breast conservation for a T2 N1 grade 2 invasive ductal carcinoma  She had a 2 1 mm focus in one of her sentinel nodes  All additional nodes were negative  This is a prognostic stage IB  She has mild swelling in the breast in the irritation along the incision line  I advised her to use antibiotic ointment to the incision  I advised her to also continue with the surgical bra  I advised her to call if she does not have improvement with these measures  She will see Dr Yolanda Lynch in follow-up  I am also sending her to Radiation Oncology  I will plan to see her again in three months for full exam sooner should the need arise

## 2019-08-15 ENCOUNTER — TELEPHONE (OUTPATIENT)
Dept: SURGICAL ONCOLOGY | Facility: CLINIC | Age: 74
End: 2019-08-15

## 2019-08-15 DIAGNOSIS — C50.412 MALIGNANT NEOPLASM OF UPPER-OUTER QUADRANT OF LEFT BREAST IN FEMALE, ESTROGEN RECEPTOR POSITIVE (HCC): Primary | ICD-10-CM

## 2019-08-15 DIAGNOSIS — Z17.0 MALIGNANT NEOPLASM OF UPPER-OUTER QUADRANT OF LEFT BREAST IN FEMALE, ESTROGEN RECEPTOR POSITIVE (HCC): Primary | ICD-10-CM

## 2019-08-15 RX ORDER — CEPHALEXIN 500 MG/1
500 CAPSULE ORAL EVERY 8 HOURS SCHEDULED
Qty: 15 CAPSULE | Refills: 0 | Status: SHIPPED | OUTPATIENT
Start: 2019-08-15 | End: 2019-08-19 | Stop reason: SDUPTHER

## 2019-08-15 NOTE — TELEPHONE ENCOUNTER
Received a call from pt with concerns of increasing redness at her surgical site  Denies fever or drainage  Discussed with Dr Diana Marsh who ordered antibiotic  Pt was informed  She will  antibiotic and get it started today  Instructed her to call office with any further changes, she agreed

## 2019-08-16 ENCOUNTER — RADIATION ONCOLOGY CONSULT (OUTPATIENT)
Dept: RADIATION ONCOLOGY | Facility: CLINIC | Age: 74
End: 2019-08-16
Attending: RADIOLOGY
Payer: COMMERCIAL

## 2019-08-16 VITALS
HEIGHT: 63 IN | WEIGHT: 140 LBS | RESPIRATION RATE: 16 BRPM | DIASTOLIC BLOOD PRESSURE: 76 MMHG | BODY MASS INDEX: 24.8 KG/M2 | TEMPERATURE: 98.6 F | HEART RATE: 78 BPM | SYSTOLIC BLOOD PRESSURE: 148 MMHG | OXYGEN SATURATION: 97 %

## 2019-08-16 DIAGNOSIS — Z17.0 MALIGNANT NEOPLASM OF UPPER-OUTER QUADRANT OF LEFT BREAST IN FEMALE, ESTROGEN RECEPTOR POSITIVE (HCC): ICD-10-CM

## 2019-08-16 DIAGNOSIS — C50.412 MALIGNANT NEOPLASM OF UPPER-OUTER QUADRANT OF LEFT BREAST IN FEMALE, ESTROGEN RECEPTOR POSITIVE (HCC): ICD-10-CM

## 2019-08-16 DIAGNOSIS — C50.412 MALIGNANT NEOPLASM OF UPPER-OUTER QUADRANT OF LEFT BREAST IN FEMALE, ESTROGEN RECEPTOR POSITIVE (HCC): Primary | ICD-10-CM

## 2019-08-16 DIAGNOSIS — Z17.0 MALIGNANT NEOPLASM OF UPPER-OUTER QUADRANT OF LEFT BREAST IN FEMALE, ESTROGEN RECEPTOR POSITIVE (HCC): Primary | ICD-10-CM

## 2019-08-16 PROCEDURE — 99211 OFF/OP EST MAY X REQ PHY/QHP: CPT | Performed by: RADIOLOGY

## 2019-08-16 NOTE — PROGRESS NOTES
Consultation - Radiation Oncology     [facility]:[id number] : 1945  Encounter: [id number]  Patient Information: [address]  Chief Complaint   Patient presents with    Consult     radiation oncology     Cancer Staging  Malignant neoplasm of upper-outer quadrant of left breast in female, estrogen receptor positive ([address] )  Staging form: Breast, AJCC 8th Edition  - Clinical: Stage IB (cT2, cN0, cM0, G2, ER+, OK+, HER2-) - Signed by Christiano Simeon MD on 2019  Laterality: Left  Method of lymph node assessment: Clinical  Histologic grading system: 3 grade system  - Pathologic: Stage IB (pT2, pN1(sn), cM0, G2, ER+, OK+, HER2-) - Signed by Christiano Simeon MD on 2019  Neoadjuvant therapy: No  Laterality: Left  Method of lymph node assessment: [place] lymph node biopsy  Histologic grading system: 3 grade system           History of Present Illness   Shannan Elizondo is a 74y o  year old female who  is seen today for discussion of radiation therapy to the left breast, referred by Dr Jeffery Lees       Patient with history of ovarian cancer in , status post MARCO/BSO and chemotherapy and Basal cell skin cancer  No previous radiation therapy  Family history of breast cancer in her mother  Genetic testing was negative in  was negative  History of previous benign bilateral breast biopsies, she has been followed by Dr Nathen Sacks due to high risk      Patient presented with palpable abnormality in the left breast   Previous bilateral screening mammogram done 5/10/18       5/21/19 Bilateral diagnostic mammogram/left US    LEFT  1) MASS  Mammo diagnostic bilateral w cad: There are no corresponding masses seen on this modality    There is a palpable marker in the upper outer right breast with underlying focal asymmetry   No evident mass or architectual distortion  US breast left limited (diagnostic):  There is a 6 mm x 5 mm x 6 mm irregularly shaped, non-parallel, hypoechoic mass with angular margins with no posterior features seen in the left breast, 8 cm from the nipple    Ultrasound guided biopsy of this lesion is recommended      RIGHT  There are no suspicious masses, grouped microcalcifications or areas of architectural distortion  The skin and nipple areolar complex are unremarkable        There are scattered calcifications bilaterally   Bilateral biopsy clips are present      Overall: BI-RADS 4      5/30/19 Left breast biopsy: invasive ductal carcinoma, grade 2ER/WV +, HER2 negative       6/10/19 seen by Dr Merary Cee, Ob/Gyn visit  - stop Premarin     6/17/19 Dr Nilesh Kirby consult  - patient not interested in chemotherapy; info given on adjuvant hormonal therapy     7/1/19 Bilateral Breast MRI  LEFT  2) NON-MASS ENHANCEMENT: There is a 23 mm x 17 mm x 13 mm heterogeneous, non-mass enhancement in a segmental distribution seen in the upper outer quadrant of the left breast in the posterior depth   This represents the biopsy-proven carcinoma   Susceptibility artifact from biopsy marker clip is noted within the non-mass enhancement   This enhancement abuts the pectoralis muscle; however, no direct invasion is identified   No skin involvement is identified no axillary adenopathy is noted      RIGHT  There are no suspicious enhancing masses or suspicious areas of nonmass enhancement  There is no axillary or internal mammary adenopathy   Susceptibility artifact from biopsy marker is noted      IMPRESSION: Unifocal carcinoma in the upper outer left breast      7/8/19 seen by Dr Adam Ramos for second opinion  Breast conservation was recommended      7/26/19 Left breast lumpectomy and SLN biopsy by Dr Adam Ramos: stage IB, pT2,pN1 (sn), G2     8/15/19 patient reported increasing redness at surgical site, started on Keflex 500 mg q8h      No fevers/chills  Patient states slightly less redness to left breast today   Incisions are closed, no drainage          11/5/19 Dr Adam Ramos f/u  11/25/19 Dr Lee Villa f/u  6/15/20  S[place] f/u     She initiated Keflex for erythema in her left breast yesterday and she is seen improvement today  Historical Information      Malignant neoplasm of upper-outer quadrant of left breast in female, estrogen receptor positive ([facility] )    5/30/2019 Initial Diagnosis     Breast cancer ([facility] )      5/30/2019 Biopsy     Left breast, ultrasound-guided biopsy, 200 8cmfn 4 passes 12g Marquee:  - Invasive mammary carcinoma of no special type (ductal, not otherwise specified)    - grade 2  - %  - %  - HER2 by FISH negative      6/2019 Genetic Testing     BRCA negative      7/8/2019 -  Cancer Staged     Staging form: Breast, AJCC 8th Edition  - Clinical: Stage IB (cT2, cN0, cM0, G2, ER+, SD+, HER2-) - Signed by Carol Dwyer MD on 7/8/2019  Laterality: Left  Method of lymph node assessment: Clinical  Histologic grading system: 3 grade system        7/26/2019 Surgery     Left breast lumpectomy with SLN biopsy:  - Invasive mammary carcinoma, 2 7 cm, grade 2  - approximately 80% of tumor is DCIS  - LN's - one lymph node with metastatic carcinoma, 2 1mm deposit, no extranodal extention  - + LVI  - margins negative      8/12/2019 -  Cancer Staged     Staging form: Breast, AJCC 8th Edition  - Pathologic: Stage IB (pT2, pN1(sn), cM0, G2, ER+, SD+, HER2-) - Signed by Carol Dwyer MD on 8/12/2019  Neoadjuvant therapy: No  Laterality: Left  Method of lymph node assessment: [place] lymph node biopsy  Histologic grading system: 3 grade system          Ovarian cancer ([place] [address] )    2005 Initial Diagnosis     Ovarian cancer [facility])      2005 Surgery     MARCO/BSO    Dr Daisy Corrales      2005 -  Chemotherapy     Dr Daisy Corrales           Past Medical History:   Diagnosis Date    Basal cell carcinoma     Breast cancer ([place] [address] )     Bulbar polio     Colon polyp     Curvature of spine     Exercise involving walking     "at work alot"    History of anemia as a child     "after polio"    Lymphedema     left leg/"after abdominal lymph nodes removed"/wears thigh high compresion stocking"    Macular degeneration     "and slight glaucoma per eye doctor both eyes"    Muscle weakness     "from polio, predominantly right side"    Osteopenia     Ovarian cancer ([address] )     radical hysterectomy w/ chemo    Scoliosis     with right hip pain occas    Swallowing difficulty     "at times since polio"    Thyroid nodule     Wears glasses      Past Surgical History:   Procedure Laterality Date    ABDOMINAL HYSTERECTOMY N/A     ovarian cancer    BOWEL RESECTION      BREAST BIOPSY Left 2015    BREAST BIOPSY Right 2014    BREAST BIOPSY Left     BREAST BIOPSY Left 2019    [facility]    BREAST LUMPECTOMY Left 2019    Procedure: LUMPECTOMY BREAST NEEDLE LOC at 1100;  Surgeon: Александр Bolden MD;  Location: [facility];  Service: Surgical Oncology    CATARACT EXTRACTION Right      SECTION      COLONOSCOPY      HYSTERECTOMY      age 56   [facility] LYMPH NODE BIOPSY Left 2019    Procedure: SENTINEL NODE BIOPSY- [id number] at 1200;  Surgeon: Александр Bolden MD;  Location: [facility];  Service: Surgical Oncology    LYMPHADENECTOMY      MAMMO NEEDLE LOCALIZATION LEFT ([employer] [facility]) Left 2019    SKIN CANCER EXCISION      US GUIDED BREAST BIOPSY LEFT COMPLETE Left 2019       Family History   Problem Relation Age of Onset    Heart failure Mother     Breast cancer Mother 78    Hypertension Father     Stroke Father     No Known Problems Brother     No Known Problems Brother     No Known Problems Daughter     No Known Problems Maternal Grandmother     Cancer Maternal Grandfather 61        chest and neck    No Known Problems Paternal Grandmother     Cancer Paternal Grandfather         unknown type and age   [facility] No Known Problems Daughter     Stomach cancer Maternal Aunt 46    Colon cancer Maternal Uncle 71    Lung cancer Maternal Aunt 48       Social History   Social History     Substance and Sexual Activity Alcohol Use Yes    Alcohol/week: 1 0 standard drinks    Types: 1 Glasses of wine per week    Frequency: Monthly or less    Comment: occasional     Social History     Substance and Sexual Activity   Drug Use No     Social History     Tobacco Use   Smoking Status Former Smoker    Packs/day: 0 50    Years: 45 00    Pack years: 22 50    Last attempt to quit: 2005    Years since quittin 6   Smokeless Tobacco Never Used   Tobacco Comment    Quit         Meds/Allergies     Current Outpatient Medications:     aspirin 81 MG tablet, Take by mouth, Disp: , Rfl:     Calcium Citrate-Vitamin D (CALCIUM CITRATE + D) 250-200 MG-UNIT TABS, Take by mouth, Disp: , Rfl:     cephalexin (KEFLEX) 500 mg capsule, Take 1 capsule (500 mg total) by mouth every 8 (eight) hours for 5 days, Disp: 15 capsule, Rfl: 0    HM VITAMIN D3 2000 units CAPS, Take by mouth, Disp: , Rfl:     multivitamin (THERAGRAN) TABS, Take 1 tablet by mouth, Disp: , Rfl:     pyridoxine (VITAMIN B6) 100 mg tablet, Take by mouth, Disp: , Rfl:     spironolactone (ALDACTONE) 50 mg tablet, , Disp: , Rfl:     traMADol (ULTRAM) 50 mg tablet, Take 1 tablet (50 mg total) by mouth every 8 (eight) hours as needed for moderate pain (Patient not taking: Reported on 2019), Disp: 15 tablet, Rfl: 0  Allergies   Allergen Reactions    Metoclopramide Myalgia     Reaction Date: 2011; Annotation - [id number]: hand cramps and biting of cheeks         Review of Systems  Constitutional: Positive for fatigue (mild, #1/10)  HENT: Negative  Eyes: Negative  Respiratory: Negative  Gastrointestinal: Negative  Endocrine: Negative  Genitourinary: Negative  Good urine flow  Occasional urinary leakage  Musculoskeletal:        Occasional right hip pain secondary to scoliosis  Skin: Positive for color change (some redness central area of left breast)  Allergic/Immunologic: Negative  Neurological: Negative      Hematological: Positive for adenopathy (left leg/foot lymphedema; wears compression stocking daily)  Bruises/bleeds easily (bruuises easily)  Psychiatric/Behavioral: Negative  OB/GYN History:  The patient underwent menarche at 4 years  Menopause Status Pre, Dory, Post, Unknown and No Answer  No LMP recorded  Patient is postmenopausal   Menopause at mid-late 42's   Menopause Reason:natural  Hormone replacement therapy: yes, used premarin, stopped 2019   3 (+ multiple births, 3 chldren)     Para 1   Age at first delivery being 20 years  Birth control pills: yes  Gyncological comment: hx MARCO/BSO for ovarian cancer  OBJECTIVE:   /76   Pulse 78   Temp 98 6 °F (37 °C)   Resp 16   Ht 5' 3" (1 6 m)   Wt 63 5 kg (140 lb)   SpO2 97%   BMI 24 80 kg/m²   Pain Assessment:  0  Performance Status: ECOG/Zubrod/WHO: 1 - Symptomatic but completely ambulatory    Physical Exam   Constitutional: She appears well-developed  HENT:   Head: Normocephalic  Hair is normal    Mouth/Throat: Oropharynx is clear and moist    Eyes: EOM are normal  No scleral icterus  Neck: Neck supple  No spinous process tenderness present  No edema present  Cardiovascular: Normal rate and regular rhythm  Pulmonary/Chest: Effort normal and breath sounds normal  No respiratory distress  She has no wheezes  She exhibits no tenderness  No breast tenderness  There is no supraclavicular axillary adenopathy palpable  Her incisions are healing well without any drainage noted  She has dull diffuse erythema in the left breast with mild swelling  She has some restriction of range of motion the left upper extremity without evidence of lymphedema   Abdominal: Soft  Bowel sounds are normal  She exhibits no distension, no ascites and no mass  There is no hepatosplenomegaly  There is no tenderness  There is no rebound  Genitourinary: No breast tenderness  Musculoskeletal: Normal range of motion  She exhibits no edema or tenderness     Lymphadenopathy:     She has no cervical adenopathy  She has no axillary adenopathy  Neurological: She is alert  She has normal strength  No cranial nerve deficit  Coordination and gait normal    Skin: Skin is intact  Psychiatric: She has a normal mood and affect  Her speech is normal and behavior is normal    Vitals reviewed  RESULTS  Lab Results    Chemistry        Component Value Date/Time    K 5 3 07/22/2019 1008     07/22/2019 1008    CO2 33 (H) 07/22/2019 1008    BUN 27 (H) 07/22/2019 1008    CREATININE 0 83 07/22/2019 1008        Component Value Date/Time    CALCIUM 10 0 07/22/2019 1008    ALKPHOS 66 07/22/2019 1008    AST 34 07/22/2019 1008    ALT 39 07/22/2019 1008            Lab Results   Component Value Date    WBC 6 65 07/22/2019    HGB 14 0 07/22/2019    HCT 44 7 07/22/2019     (H) 07/22/2019     07/22/2019         Imaging Studies  Nm Lymphatic Breast    Result Date: 7/26/2019  Narrative: SENTINEL NODE LYMPHOSCINTIGRAPHY INDICATION: Left breast carcinoma FINDINGS: 0 52 mCi Tc-99m sulfur colloid (0 6 cc volume) was administered in divided doses by myself in the left periareolar region  Scintigraphic images were obtained over the left hemithorax and axilla in multiple projections  2 left axillary nodes were identified  Using scintigraphic guidance, the corresponding skin site was marked with an indelible marker  The patient was transferred to the operating room in satisfactory condition  Impression: [place] lymph nodes localized to left axilla  Workstation performed: [id number]     Mammo Needle Localization Left ([facility] [employer])    Result Date: 7/26/2019  Narrative: Patient was prepped in standard sterile fashion  Under mammographic guidance, a 3 cm localization needle was advanced via a left lateral approach  Appropriate positioning was confirmed via mammography  Localization wire was subsequently deployed   Patient  was sent to the operating room in good condition  Workstation performed: [id number]     Mammo Breast Specimen Left (no Charge)    Result Date: 7/26/2019  Narrative: Specimen radiographs demonstrate presence of target clip and calcifications  Workstation performed: [id number]         Pathology:    Final Diagnosis   A  Left breast (lumpectomy):     - Invasive mammary carcinoma of no special type (ductal, not otherwise specified)  - Tumor size: 27 mm (gross measurement)  Tumor rdgrdrrdarddrderd:rd rd3rd of 3      - Approximately 80% of tumor is DCIS  - Inked resection surfaces negative for carcinoma  - One (1) lymph node, negative for carcinoma       B  New superior margin, left breast (excision):     - Benign fibroadipose tissue  - New superior margin negative for carcinoma      C  New medial margin, left breast (excision):     - Benign breast tissue  - New medial margin negative for carcinoma      D  New inferior margin, left breast (excision):     - Benign breast tissue  - New inferior margin negative for carcinoma      E  New lateral margin, left breast (excision):     - Benign fibroadipose tissue  - New lateral margin negative for carcinoma       F  New posterior margin, left breast (excision):     - Benign fibroadipose tissue and skeletal muscle  - New posterior margin negative for carcinoma       G  [place] lymph node #1, left axilla (excision):     - One (1) lymph node, negative for carcinoma       H  [address] lymph node #2, left axilla (excision):     - One (1) lymph node, negative for carcinoma       I  [address] lymph node #3, left axilla (excision):     - Metastatic carcinoma involving one (1) of three (3) lymph nodes  - Tumor deposit measures 2 1 mm; negative for extranodal extension  Electronically signed by Regla Torres MD on 8/2/2019 at 1025   Comments: This is an appended report  These results have been appended to a previously preliminary verified report  Preliminary Diagnosis   A   Left breast (lumpectomy): - Invasive and in situ carcinoma, pending additional studies      B  New superior margin, left breast (excision):     - Benign fibroadipose tissue      C  New medial margin, left breast (excision):     - Benign breast tissue      D  New inferior margin, left breast (excision):     - Breast tissue with collection of crushed / cauterized glands, pending additional studies      E  New lateral margin, left breast (excision):     - Benign fibroadipose tissue      F  New posterior margin, left breast (excision):     - Benign fibroadipose tissue and skeletal muscle      G  [place] lymph node #1, left axilla (excision):     - One (1) lymph node, negative for carcinoma       H  [place] lymph node #2, left axilla (excision):     - One (1) lymph node, negative for carcinoma       I  [place] lymph node #3, left axilla (excision):     - Focus of likely metastatic carcinoma involving 1 of 3 lymph nodes, pending confirmatory studies  Preliminary result electronically signed by Regla Torres MD on 7/31/2019 at 66 [phone] 76   Microscopic Description    1   Ancillary Studies:  Performed on original biopsy material, [id number], and reported to be:     - ER: 100% / Positive      - [place]: 100% / Positive      - HER2 (IHC):  2+ / Equivocal      - HER2 (FISH):  Not Amplified     - Repeat HER2 testing (2013 ASCO/CAP Recommendations): Not indicated      - Best representative tumor block: A17       -- Sufficient tumor present for          [place]ia Mammaprint/Blueprint (1 cm2 of invasive tumor in aggregate): No          MI Profile/Foundation One (at least 5 x 5 mm of tumor): Yes  2   Pathologic Stage Classification (pTNM, AJCC 8th Edition): IIA       8th ed, AJCC Anatomic Stage:  at least Stage IIB - pT2, pN1a, cM0, G2  3   8th ed  AJCC Pathologic Prognostic Stage: IB         ASSESSMENT  1   Malignant neoplasm of upper-outer quadrant of left breast in female, estrogen receptor positive [facility])  Ambulatory referral to Radiation Oncology     Cancer Staging  Malignant neoplasm of upper-outer quadrant of left breast in female, estrogen receptor positive ([facility] )  Staging form: Breast, AJCC 8th Edition  - Clinical: Stage IB (cT2, cN0, cM0, G2, ER+, UT+, HER2-) - Signed by Martita Gill MD on 7/8/2019  Laterality: Left  Method of lymph node assessment: Clinical  Histologic grading system: 3 grade system  - Pathologic: Stage IB (pT2, pN1(sn), cM0, G2, ER+, UT+, HER2-) - Signed by Martita Gill MD on 8/12/2019  Neoadjuvant therapy: No  Laterality: Left  Method of lymph node assessment: [place] lymph node biopsy  Histologic grading system: 3 grade system        PLAN/DISCUSSION  No orders of the defined types were placed in this encounter  Víctor Marinelli is a 74y o  year old female with T2 N1 a left breast carcinoma status post breast conservation surgery  Her tumor is ER/UT positive , her 2-  Her final margins of resection are negative  She had 3 sentinel lymph nodes 1 which contained a small focus of metastatic carcinoma without extra capsular extension  I reviewed with her and her family a course of adjuvant radiation therapy directed to the left breast   I reviewed both standard fractionation as well as a hypo fractionated course which I believe she will likely be a candidate for  A boost to the primary site was also discussed with her  They understand the goal of treatment is to reduce her risk of recurrence in the breast   Her fields will use high tangential beams to include her lower axilla in light of her sentinel node positivity  The possible side effects both acute and long-term were reviewed with her in detail  She has met with Medical Oncology and will be receiving hormonal therapy  We have scheduled her for CT simulation planning within the next few weeks to allow for further postoperative healing  She is agreeable to the above outlined plan            Michael García MD  8/16/2019,2:06 PM      Portions of the record may have been created with voice recognition software   Occasional wrong word or "sound a like" substitutions may have occurred due to the inherent limitations of voice recognition software   Read the chart carefully and recognize, using context, where substitutions have occurred

## 2019-08-16 NOTE — PROGRESS NOTES
Jakob Al 1945 is a 74 y o  female     74year old female is seen today for discussion of radiation therapy to the left breast, referred by Dr Tammy Botello  Patient with history of ovarian cancer in 2005, status post MARCO/BSO and chemotherapy and Basal cell skin cancer  No previous radiation therapy  Family history of breast cancer in her mother  Genetic testing was negative in 2013 was negative  History of previous benign bilateral breast biopsies, she has been followed by Dr Jeff Camara due to high risk  Patient presented with palpable abnormality in the left breast   Previous bilateral screening mammogram done 5/10/18 was B2      5/21/19 Bilateral diagnostic mammogram/left US    LEFT  1) MASS  Mammo diagnostic bilateral w cad: There are no corresponding masses seen on this modality  There is a palpable marker in the upper outer right breast with underlying focal asymmetry  No evident mass or architectual distortion  US breast left limited (diagnostic): There is a 6 mm x 5 mm x 6 mm irregularly shaped, non-parallel, hypoechoic mass with angular margins with no posterior features seen in the left breast, 8 cm from the nipple  Ultrasound guided biopsy of this lesion is recommended      RIGHT  There are no suspicious masses, grouped microcalcifications or areas of architectural distortion  The skin and nipple areolar complex are unremarkable        There are scattered calcifications bilaterally  Bilateral biopsy clips are present  Overall: BI-RADS 4     5/30/19 Left breast biopsy: invasive ductal carcinoma, grade 2ER/NE +, HER2 negative       6/10/19 seen by Dr Concepción Dewitt, Ob/Gyn visit  - stop Premarin    6/17/19 Dr Corinna Velazquez consult  - patient not interested in chemotherapy; info given on adjuvant hormonal therapy    7/1/19 Bilateral Breast MRI  LEFT  2) NON-MASS ENHANCEMENT: There is a 23 mm x 17 mm x 13 mm heterogeneous, non-mass enhancement in a segmental distribution seen in the upper outer quadrant of the left breast in the posterior depth  This represents the biopsy-proven carcinoma  Susceptibility artifact from biopsy marker clip is noted within the non-mass enhancement  This enhancement abuts the pectoralis muscle; however, no direct invasion is identified  No skin involvement is identified no axillary adenopathy is noted      RIGHT  There are no suspicious enhancing masses or suspicious areas of nonmass enhancement  There is no axillary or internal mammary adenopathy  Susceptibility artifact from biopsy marker is noted  IMPRESSION: Unifocal carcinoma in the upper outer left breast     7/8/19 seen by Dr Shalom Corona for second opinion  Breast conservation was recommended  7/26/19 Left breast lumpectomy and SLN biopsy by Dr Rausch Manual: stage IB, pT2,pN1 (sn), G2    8/15/19 patient reported increasing redness at surgical site, started on Keflex 500 mg q8h  No fevers/chills  Patient states slightly less redness to left breast today  Incisions are closed, no drainage  11/5/19 Dr Shalom Corona f/u  11/25/19 Dr Royce Carrillo f/u  6/15/20 Dr Megan Mello f/u         Malignant neoplasm of upper-outer quadrant of left breast in female, estrogen receptor positive ([facility] [address] )    5/30/2019 Initial Diagnosis     Breast cancer ([facility] [address] )      5/30/2019 Biopsy     Left breast, ultrasound-guided biopsy, 200 8cmfn 4 passes 12g Marquee:  - Invasive mammary carcinoma of no special type (ductal, not otherwise specified)    - grade 2  - %  - %  - HER2 by FISH negative      6/2019 Genetic Testing     BRCA negative      7/8/2019 -  Cancer Staged     Staging form: Breast, AJCC 8th Edition  - Clinical: Stage IB (cT2, cN0, cM0, G2, ER+, GA+, HER2-) - Signed by Surinder Rudd MD on 7/8/2019  Laterality: Left  Method of lymph node assessment: Clinical  Histologic grading system: 3 grade system        7/26/2019 Surgery     Left breast lumpectomy with SLN biopsy:  - Invasive mammary carcinoma, 2 7 cm, grade 2  - approximately 80% of tumor is DCIS  - LN's - one lymph node with metastatic carcinoma, 2 1mm deposit, no extranodal extention  - + LVI  - margins negative      2019 -  Cancer Staged     Staging form: Breast, AJCC 8th Edition  - Pathologic: Stage IB (pT2, pN1(sn), cM0, G2, ER+, DE+, HER2-) - Signed by Patricia Rubi MD on 2019  Neoadjuvant therapy: No  Laterality: Left  Method of lymph node assessment: [place] lymph node biopsy  Histologic grading system: 3 grade system          Ovarian cancer ([facility] [address] )     Initial Diagnosis     Ovarian cancer [facility])       Surgery     MARCO/BSO    Dr Kathie Bobo       -  Chemotherapy     Dr Pineda Dupree Trial: no      OB/GYN History:  The patient underwent menarche at 4 years  Menopause Status Pre, Dory, Post, Unknown and No Answer  No LMP recorded  Patient is postmenopausal   Menopause at mid-late 42's   Menopause Reason:natural  Hormone replacement therapy: yes, used premarin, stopped 2019   3 (+ multiple births, 3 chldren)    Para 1   Age at first delivery being 20 years  Birth control pills: yes     Gyncological comment: hx MARCO/BSO for ovarian cancer  [unfilled]  *    Health Maintenance   Topic Date Due    Depression Screening PHQ  1945    CRC Screening: Colonoscopy  1945    DTaP,Tdap,and Td Vaccines (1 - Tdap) 1964    Fall Risk  2008    Urinary Incontinence Screening  2008    Pneumococcal Vaccine: 65+ Years (1 of 2 - PCV13) 2008    INFLUENZA VACCINE  2019    BMI: Adult  2020    Pneumococcal Vaccine: Pediatrics (0 to 5 Years) and At-Risk Patients (6 to 59 Years)  Aged Out    HEPATITIS B VACCINES  Aged Out       Past Medical History:   Diagnosis Date    Basal cell carcinoma     Breast cancer ([facility] [address] )     Bulbar polio     Colon polyp     Curvature of spine     Exercise involving walking     "at work alot"    History of anemia as a child     "after polio"    Lymphedema     left leg/"after abdominal lymph nodes removed"/wears thigh high compresion stocking"    Macular degeneration     "and slight glaucoma per eye doctor both eyes"    Muscle weakness     "from polio, predominantly right side"    Osteopenia     Ovarian cancer ([address] )     radical hysterectomy w/ chemo    Scoliosis     with right hip pain occas    Swallowing difficulty     "at times since polio"    Thyroid nodule     Wears glasses        Past Surgical History:   Procedure Laterality Date    ABDOMINAL HYSTERECTOMY N/A     ovarian cancer    BOWEL RESECTION      BREAST BIOPSY Left 2015    BREAST BIOPSY Right 2014    BREAST BIOPSY Left     BREAST BIOPSY Left 2019    [facility]    BREAST LUMPECTOMY Left 2019    Procedure: LUMPECTOMY BREAST NEEDLE LOC at 1100;  Surgeon: Lazarus Rain, MD;  Location: [facility];  Service: Surgical Oncology    CATARACT EXTRACTION Right      SECTION      COLONOSCOPY      HYSTERECTOMY      age 56   David Leech LYMPH NODE BIOPSY Left 2019    Procedure: SENTINEL NODE BIOPSY- [address] Hodge [id number] at 1200;  Surgeon: Lazarus Rain, MD;  Location: [facility];  Service: Surgical Oncology    LYMPHADENECTOMY      MAMMO NEEDLE LOCALIZATION LEFT (ALL INC) Left 2019    SKIN CANCER EXCISION      US GUIDED BREAST BIOPSY LEFT COMPLETE Left 2019       Family History   Problem Relation Age of Onset    Heart failure Mother     Breast cancer Mother 78    Hypertension Father     Stroke Father     No Known Problems Brother     No Known Problems Brother     No Known Problems Daughter     No Known Problems Maternal Grandmother     Cancer Maternal Grandfather 61        chest and neck    No Known Problems Paternal Grandmother     Cancer Paternal Grandfather         unknown type and age   David Velazquez No Known Problems Daughter     Stomach cancer Maternal Aunt 46    Colon cancer Maternal Uncle 71    Lung cancer Maternal Aunt 48       Social History     Tobacco Use    Smoking status: Former Smoker     Packs/day: 0 50     Years: 45 00     Pack years: 22 50     Last attempt to quit: 2005     Years since quittin 6    Smokeless tobacco: Never Used    Tobacco comment: Quit   Substance Use Topics    Alcohol use: Yes     Alcohol/week: 1 0 standard drinks     Types: 1 Glasses of wine per week     Frequency: Monthly or less     Comment: occasional    Drug use: No          Current Outpatient Medications:     aspirin 81 MG tablet, Take by mouth, Disp: , Rfl:     Calcium Citrate-Vitamin D (CALCIUM CITRATE + D) 250-200 MG-UNIT TABS, Take by mouth, Disp: , Rfl:     cephalexin (KEFLEX) 500 mg capsule, Take 1 capsule (500 mg total) by mouth every 8 (eight) hours for 5 days, Disp: 15 capsule, Rfl: 0    HM VITAMIN D3 2000 units CAPS, Take by mouth, Disp: , Rfl:     multivitamin (THERAGRAN) TABS, Take 1 tablet by mouth, Disp: , Rfl:     pyridoxine (VITAMIN B6) 100 mg tablet, Take by mouth, Disp: , Rfl:     spironolactone (ALDACTONE) 50 mg tablet, , Disp: , Rfl:     traMADol (ULTRAM) 50 mg tablet, Take 1 tablet (50 mg total) by mouth every 8 (eight) hours as needed for moderate pain (Patient not taking: Reported on 2019), Disp: 15 tablet, Rfl: 0    Allergies   Allergen Reactions    Metoclopramide Myalgia     Reaction Date: 2011; Annotation - [id number]: hand cramps and biting of cheeks        Review of Systems:  Review of Systems   Constitutional: Positive for fatigue (mild, #1/10)  HENT: Negative  Eyes: Negative  Respiratory: Negative  Gastrointestinal: Negative  Endocrine: Negative  Genitourinary: Negative  Good urine flow  Occasional urinary leakage  Musculoskeletal:        Occasional right hip pain secondary to scoliosis  Skin: Positive for color change (some redness central area of left breast)  Allergic/Immunologic: Negative  Neurological: Negative      Hematological: Positive for adenopathy (left leg/foot lymphedema; wears compression stocking daily)  Bruises/bleeds easily (bruuises easily)  Psychiatric/Behavioral: Negative  Vitals:    08/16/19 1226   BP: 148/76   Pulse: 78   Resp: 16   Temp: 98 6 °F (37 °C)   SpO2: 97%   Weight: 63 5 kg (140 lb)   Height: 5' 3" (1 6 m)       Pain assessment: 0         No results found for: PSA:    Imaging:No images are attached to the encounter       Teaching: [facility] RT packet given

## 2019-08-19 ENCOUNTER — OFFICE VISIT (OUTPATIENT)
Dept: SURGICAL ONCOLOGY | Facility: CLINIC | Age: 74
End: 2019-08-19

## 2019-08-19 VITALS
BODY MASS INDEX: 24.45 KG/M2 | HEIGHT: 63 IN | RESPIRATION RATE: 18 BRPM | SYSTOLIC BLOOD PRESSURE: 100 MMHG | TEMPERATURE: 96.3 F | WEIGHT: 138 LBS | HEART RATE: 85 BPM | DIASTOLIC BLOOD PRESSURE: 60 MMHG

## 2019-08-19 DIAGNOSIS — N64.89 SEROMA OF BREAST: Primary | ICD-10-CM

## 2019-08-19 DIAGNOSIS — Z17.0 MALIGNANT NEOPLASM OF UPPER-OUTER QUADRANT OF LEFT BREAST IN FEMALE, ESTROGEN RECEPTOR POSITIVE (HCC): ICD-10-CM

## 2019-08-19 DIAGNOSIS — C50.412 MALIGNANT NEOPLASM OF UPPER-OUTER QUADRANT OF LEFT BREAST IN FEMALE, ESTROGEN RECEPTOR POSITIVE (HCC): ICD-10-CM

## 2019-08-19 PROCEDURE — 99024 POSTOP FOLLOW-UP VISIT: CPT | Performed by: SURGERY

## 2019-08-19 RX ORDER — CEPHALEXIN 500 MG/1
500 CAPSULE ORAL EVERY 8 HOURS SCHEDULED
Qty: 15 CAPSULE | Refills: 0 | Status: SHIPPED | OUTPATIENT
Start: 2019-08-19 | End: 2019-08-24

## 2019-08-19 NOTE — PROGRESS NOTES
74 y o  female is here today s/p left lumpectomy and sentinel node biopsy  She reports redness in the breast as well as tightness in the lumpectomy cavity  She states that she had improvement initially when starting the antibiotics  Physical Exam   Constitutional: She appears well-developed and well-nourished  Pulmonary/Chest: Left breast exhibits skin change (Well-healing scar in the upper outer left breast with fullness secondary to a seroma, there is mild erythema in the breast along with resolving ecchymosis)  Her lumpectomy cavity was aspirated today in the office obtaining serous sanguinous fluid  There is no evidence of abscess  Neurological: She is alert  Psychiatric: She has a normal mood and affect  Diagnoses and all orders for this visit:    Seroma of breast    Malignant neoplasm of upper-outer quadrant of left breast in female, estrogen receptor positive ([address] )  -     cephalexin (KEFLEX) 500 mg capsule; Take 1 capsule (500 mg total) by mouth every 8 (eight) hours for 5 days        Assessment/Plan:  58-year-old female status post left lumpectomy and sentinel node biopsy  She had a seroma that was aspirated today in the office  I will continue her on antibiotics  I will see her again next week for another postop check

## 2019-08-23 ENCOUNTER — TELEPHONE (OUTPATIENT)
Dept: SURGICAL ONCOLOGY | Facility: CLINIC | Age: 74
End: 2019-08-23

## 2019-08-23 NOTE — TELEPHONE ENCOUNTER
Received a call from patient who shared her left breast was becoming red again  She denied any fever, swelling, or warmth to touch  In talking with pt asked her when she took her last antibiotic, she verbalized she did not remember to get the new antibiotic script filled and has not started it  Discussed with Dr Peguero  Pt to get antibiotic script filled and start today  She understands  She has a follow up appt on Tuesday with Dr Peguero

## 2019-08-27 ENCOUNTER — OFFICE VISIT (OUTPATIENT)
Dept: SURGICAL ONCOLOGY | Facility: CLINIC | Age: 74
End: 2019-08-27

## 2019-08-27 VITALS
RESPIRATION RATE: 16 BRPM | HEIGHT: 63 IN | TEMPERATURE: 97.2 F | SYSTOLIC BLOOD PRESSURE: 100 MMHG | WEIGHT: 139.2 LBS | BODY MASS INDEX: 24.66 KG/M2 | HEART RATE: 88 BPM | DIASTOLIC BLOOD PRESSURE: 70 MMHG

## 2019-08-27 DIAGNOSIS — Z17.0 MALIGNANT NEOPLASM OF UPPER-OUTER QUADRANT OF LEFT BREAST IN FEMALE, ESTROGEN RECEPTOR POSITIVE (HCC): Primary | ICD-10-CM

## 2019-08-27 DIAGNOSIS — N64.89 HEMATOMA OF BREAST: ICD-10-CM

## 2019-08-27 DIAGNOSIS — C50.412 MALIGNANT NEOPLASM OF UPPER-OUTER QUADRANT OF LEFT BREAST IN FEMALE, ESTROGEN RECEPTOR POSITIVE (HCC): Primary | ICD-10-CM

## 2019-08-27 PROCEDURE — 99024 POSTOP FOLLOW-UP VISIT: CPT | Performed by: SURGERY

## 2019-08-27 NOTE — PROGRESS NOTES
74 y o  female is here today s/p left breast lumpectomy,SLNB   She  reports discoloration of breast   She denies any fevers  She currently taking antibiotics  Physical Exam   Constitutional: She appears well-nourished  Pulmonary/Chest: Left breast exhibits skin change (Well-healing incision in the upper outer left breast with no signs of infection, there is purple discoloration of the breast likely secondary to hematoma, there is resolving ecchymosis in the medial aspect of the breast, there is no warmth or pain)  Aspirated 48 cc of hematoma from the upper outer left breast   Neurological: She is alert  Psychiatric: She has a normal mood and affect  Diagnoses and all orders for this visit:    Malignant neoplasm of upper-outer quadrant of left breast in female, estrogen receptor positive ([facility] [address] )    Hematoma of breast        Assessment/Plan:  70-year-old female status post left breast conservation for a T2 N1 invasive ductal carcinoma of the left breast   She has a postoperative hematoma  This was aspirated today in the office  There are no signs of infection; however, I told her to complete the current course of antibiotics  I advised her to use warm compresses to the breast twice daily  I will see her again in one week  She states that she was scheduled for simulation today in Radiation Oncology  Upon reviewing the record, I do not see that she has had a postoperative visit with Dr Vincent Kayser  I will look into this for her  I will otherwise see her again in one week or sooner should the need arise

## 2019-09-03 ENCOUNTER — OFFICE VISIT (OUTPATIENT)
Dept: SURGICAL ONCOLOGY | Facility: CLINIC | Age: 74
End: 2019-09-03

## 2019-09-03 VITALS
HEIGHT: 63 IN | WEIGHT: 140.6 LBS | SYSTOLIC BLOOD PRESSURE: 120 MMHG | RESPIRATION RATE: 16 BRPM | DIASTOLIC BLOOD PRESSURE: 80 MMHG | HEART RATE: 62 BPM | BODY MASS INDEX: 24.91 KG/M2 | TEMPERATURE: 98.6 F

## 2019-09-03 DIAGNOSIS — C50.412 MALIGNANT NEOPLASM OF UPPER-OUTER QUADRANT OF LEFT BREAST IN FEMALE, ESTROGEN RECEPTOR POSITIVE (HCC): Primary | ICD-10-CM

## 2019-09-03 DIAGNOSIS — N64.89 HEMATOMA OF BREAST: ICD-10-CM

## 2019-09-03 DIAGNOSIS — Z17.0 MALIGNANT NEOPLASM OF UPPER-OUTER QUADRANT OF LEFT BREAST IN FEMALE, ESTROGEN RECEPTOR POSITIVE (HCC): Primary | ICD-10-CM

## 2019-09-03 PROCEDURE — 99024 POSTOP FOLLOW-UP VISIT: CPT | Performed by: SURGERY

## 2019-09-03 NOTE — PROGRESS NOTES
74 y o  female is here today s/p left breast lumpectomy and sentinel node biopsy  She reports some residual heaviness of the breast       Physical Exam   Constitutional: She appears well-developed and well-nourished  Pulmonary/Chest: Right breast exhibits skin change (well healing incision upper outer left breast with resolving ecchymosis, fullness in the upper outer quadrant only)  Aspirated roughly 10ccs of old blood   Musculoskeletal:   Decreased ROM left arm and tightness at the pectoralis edge   Neurological: She is alert  Psychiatric: She has a normal mood and affect  Diagnoses and all orders for this visit:    Malignant neoplasm of upper-outer quadrant of left breast in female, estrogen receptor positive ([facility] [address] )    Hematoma of breast        Assessment/Plan: 73 yo female s/p left breast conservation  She has a hematoma that I have been aspirating in the office  Most of this is consolidated but overall improved and localized to the upper outer quadrant  I asked her to continue with the warm compresses  I also advised her to do arm exercises for her ROM and to do massage to the muscle edge  I will see her again in one week

## 2019-09-09 ENCOUNTER — OFFICE VISIT (OUTPATIENT)
Dept: SURGICAL ONCOLOGY | Facility: CLINIC | Age: 74
End: 2019-09-09

## 2019-09-09 VITALS
TEMPERATURE: 98.6 F | HEART RATE: 86 BPM | SYSTOLIC BLOOD PRESSURE: 100 MMHG | RESPIRATION RATE: 16 BRPM | BODY MASS INDEX: 24.2 KG/M2 | DIASTOLIC BLOOD PRESSURE: 70 MMHG | HEIGHT: 63 IN | WEIGHT: 136.6 LBS

## 2019-09-09 DIAGNOSIS — N64.89 HEMATOMA OF BREAST: Primary | ICD-10-CM

## 2019-09-09 DIAGNOSIS — Z17.0 MALIGNANT NEOPLASM OF UPPER-OUTER QUADRANT OF LEFT BREAST IN FEMALE, ESTROGEN RECEPTOR POSITIVE (HCC): ICD-10-CM

## 2019-09-09 DIAGNOSIS — C50.412 MALIGNANT NEOPLASM OF UPPER-OUTER QUADRANT OF LEFT BREAST IN FEMALE, ESTROGEN RECEPTOR POSITIVE (HCC): ICD-10-CM

## 2019-09-09 PROCEDURE — 99024 POSTOP FOLLOW-UP VISIT: CPT | Performed by: SURGERY

## 2019-09-09 NOTE — PROGRESS NOTES
74 y o  female is here today s/p left breast conservation  She reports using the warm compresses and noticing improvement  She still has some fullness in the upper portion of the breast      Physical Exam   Constitutional: She appears well-developed and well-nourished  Pulmonary/Chest: Left breast exhibits skin change (Well-healing incision in the upper outer left breast with no signs of infection)  Left breast exhibits no tenderness  there is resolving ecchymosis in the left breast, most of the tissue is now soft with fullness in the upper outer quadrant; there is a loculated mostly consolidated collection in the left upper outer quadrant, this was aspirated today with roughly 10 cc of hematoma aspirated   Neurological: She is alert  Psychiatric: She has a normal mood and affect  Diagnoses and all orders for this visit:    Hematoma of breast    Malignant neoplasm of upper-outer quadrant of left breast in female, estrogen receptor positive ([facility] [address] )        Assessment/Plan:  40-year-old female status post left breast conservation for a T2 N1 invasive ductal carcinoma  She developed a hematoma  Most of this has resolved  There is a loculated consolidated area in the upper outer quadrant  The remaining portion the breast is soft with resolving ecchymosis  I was able to aspirate roughly 10 cc of this today in the office  I advised her to continue using the warm compresses twice daily  I am sending her back to both medical and radiation oncology  I will see her again as scheduled or sooner should the need arise

## 2019-09-11 ENCOUNTER — APPOINTMENT (OUTPATIENT)
Dept: RADIATION ONCOLOGY | Facility: CLINIC | Age: 74
End: 2019-09-11
Attending: RADIOLOGY
Payer: COMMERCIAL

## 2019-09-11 PROCEDURE — 77290 THER RAD SIMULAJ FIELD CPLX: CPT | Performed by: RADIOLOGY

## 2019-09-11 PROCEDURE — 77332 RADIATION TREATMENT AID(S): CPT | Performed by: RADIOLOGY

## 2019-09-18 ENCOUNTER — APPOINTMENT (OUTPATIENT)
Dept: RADIATION ONCOLOGY | Facility: CLINIC | Age: 74
End: 2019-09-18
Attending: RADIOLOGY
Payer: COMMERCIAL

## 2019-09-18 ENCOUNTER — OFFICE VISIT (OUTPATIENT)
Dept: HEMATOLOGY ONCOLOGY | Facility: CLINIC | Age: 74
End: 2019-09-18
Payer: COMMERCIAL

## 2019-09-18 VITALS
OXYGEN SATURATION: 96 % | HEART RATE: 92 BPM | TEMPERATURE: 98.6 F | SYSTOLIC BLOOD PRESSURE: 116 MMHG | RESPIRATION RATE: 16 BRPM | DIASTOLIC BLOOD PRESSURE: 74 MMHG | BODY MASS INDEX: 24.98 KG/M2 | HEIGHT: 63 IN | WEIGHT: 141 LBS

## 2019-09-18 DIAGNOSIS — Z17.0 MALIGNANT NEOPLASM OF UPPER-OUTER QUADRANT OF LEFT BREAST IN FEMALE, ESTROGEN RECEPTOR POSITIVE (HCC): ICD-10-CM

## 2019-09-18 DIAGNOSIS — Z13.820 SCREENING FOR OSTEOPOROSIS: Primary | ICD-10-CM

## 2019-09-18 DIAGNOSIS — C50.412 MALIGNANT NEOPLASM OF UPPER-OUTER QUADRANT OF LEFT BREAST IN FEMALE, ESTROGEN RECEPTOR POSITIVE (HCC): ICD-10-CM

## 2019-09-18 PROCEDURE — 77295 3-D RADIOTHERAPY PLAN: CPT | Performed by: RADIOLOGY

## 2019-09-18 PROCEDURE — 77300 RADIATION THERAPY DOSE PLAN: CPT | Performed by: RADIOLOGY

## 2019-09-18 PROCEDURE — 99215 OFFICE O/P EST HI 40 MIN: CPT | Performed by: PHYSICIAN ASSISTANT

## 2019-09-18 PROCEDURE — 77334 RADIATION TREATMENT AID(S): CPT | Performed by: RADIOLOGY

## 2019-09-18 NOTE — PROGRESS NOTES
Hematology/Oncology Outpatient Follow-up  Eva Day 74 y o  female 1945 [id number]    Date:  9/18/2019      Assessment and Plan:  1  Screening for osteoporosis  Patient's DEXA scan was approximately 3-5 years ago per patient  I was unable to find any records regarding this  She does take calcium as well as vitamin-D  Vitamin-D was assessed in May 2019 and was within normal range, 50  Advised to continue same vitamin-D regimen of 3000 International Units daily as well as calcium in her diet, and in supplement 600 mg  Continue to have active lifestyle  Baseline DEXA scan is requested at this time as aromatase inhibitors will likely decreased bone density     - DXA bone density spine hip and pelvis; Future    2  Malignant neoplasm of upper-outer quadrant of left breast in female, estrogen receptor positive [facility])  49-year-old female with history of stage IIB left-sided breast cancer status post lumpectomy presents today to discuss adjuvant therapy  Reviewed that since patient is stage IIB that she could be a candidate for chemotherapy in adjuvant setting  Reviewed that this is is coming from the very slight invasion into the lymph node  We reviewed that studies regarding adjuvant chemotherapy, Taxotere, cyclophosphamide, studied patients who were 79years of age and less  Reviewed that likely she would not have very great benefit in regards to chemotherapy  If she would like to consider chemotherapy we offered arcus site testing  She states at her age and previous experience with chemotherapy for her ovarian cancer, she does not wish to proceed with any adjuvant chemotherapy  She would like to proceed with aromatase inhibitor alone, which is appropriate as well  Reviewed aromatase inhibitors  Reviewed side effects include but are not limited to hot flashes, joint pains, osteoporosis, headaches, etc   Written information was given to patient in regards to this    Dr Evelyn Aleman also spoke with the patient  Patient was given 1 month samples of anastrozole 1 mg  She was advised she can begin taking this at any time, okay to take with radiation therapy  She was advised to call the office within the next 3 weeks to determine have well she is tolerating the medication  Reviewed that if she does not tolerate anastrozole that we can try in another aromatase inhibitor  Baseline CA 27 29 is also requested  Follow up in 3 months  HPI:  49-year-old female (ECOG 0) with history of invasive mammary carcinoma who is status post lumpectomy and sentinel lymph node sampling  Patient's tumor measured 2 7 cm, grade 2 of 3  Approximately 80% of tumor is DCIS  3 sentinel lymph nodes were removed  1 had focus of metastatic carcinoma measuring 2 1 mm; however negative for extranodal extension  Patient's anatomic stage is at least stage IIB, T2, N1 a, C M0, G2   %, %, her 2-by FISH    She had postoperative complications with hematoma formation for which she has been following Surgical Oncology  She states that she continues to apply warm compresses to the area and it is improving  She has follow-up with Surgical Oncology for routine visit in November 2019  Interval history:    ROS: Review of Systems   Constitutional: Positive for fatigue (Minimal)  Negative for activity change (Remains active and continues to work full-time), appetite change, chills, fever and unexpected weight change  Respiratory: Negative for cough and shortness of breath  Cardiovascular: Negative for chest pain, palpitations and leg swelling  Gastrointestinal: Negative for abdominal pain, blood in stool, constipation, diarrhea, nausea and vomiting  Genitourinary: Negative for difficulty urinating and dysuria  Musculoskeletal: Positive for arthralgias (Primarily right hip and right ankle) and back pain (Has scoliosis)  Skin: Negative      Neurological: Negative for dizziness, weakness, light-headedness, numbness and headaches  Hematological: Negative  Psychiatric/Behavioral: Negative          Past Medical History:   Diagnosis Date    Basal cell carcinoma     Bulbar polio     Colon polyp     Curvature of spine     Exercise involving walking     "at work alot"    History of anemia as a child     "after polio"    Lymphedema     left leg/"after abdominal lymph nodes removed"/wears thigh high compresion stocking"    Macular degeneration     "and slight glaucoma per eye doctor both eyes"    Muscle weakness     "from polio, predominantly right side"    Osteopenia     Ovarian cancer ([address] )     radical hysterectomy w/ chemo    Scoliosis     with right hip pain occas    Swallowing difficulty     "at times since polio"    Thyroid nodule     Wears glasses        Past Surgical History:   Procedure Laterality Date    ABDOMINAL HYSTERECTOMY N/A     ovarian cancer    BOWEL RESECTION      BREAST BIOPSY Left 2015    BREAST BIOPSY Right 2014    BREAST BIOPSY Left     [facility]OPSY Left 2019    [facility]    BREAST LUMPECTOMY Left 2019    Procedure: LUMPECTOMY BREAST NEEDLE LOC at 1100;  Surgeon: Candy Cotton MD;  [facility];  Service: Surgical Oncology    CATARACT EXTRACTION Right      SECTION      COLONOSCOPY      HYSTERECTOMY      age 56   Eva Dov LYMPH NODE BIOPSY Left 2019    Procedure: SENTINEL NODE BIOPSY- NUC MED INJ at 1200;  Surgeon: Candy Cotton MD;  [facility];  Service: Surgical Oncology    LYMPHADENECTOMY      MAMMO NEEDLE LOCALIZATION LEFT (ALL INC) Left 2019    SKIN CANCER EXCISION      US GUIDED BREAST BIOPSY LEFT COMPLETE Left 2019       Social History     Socioeconomic History    Marital status: /Civil Union     Spouse name: None    Number of children: None    Years of education: None    Highest education level: None   Occupational History    None   Social Needs    Financial resource strain: None    Food insecurity:     Worry: None     Inability: None    Transportation needs:     Medical: None     Non-medical: None   Tobacco Use    Smoking status: Former Smoker     Packs/day: 0 50     Years: 45 00     Pack years: 22 50     Last attempt to quit:      Years since quittin 7    Smokeless tobacco: Never Used    Tobacco comment: Quit   Substance and Sexual Activity    Alcohol use:  Yes     Alcohol/week: 1 0 standard drinks     Types: 1 Glasses of wine per week     Frequency: Monthly or less     Comment: occasional    Drug use: No    Sexual activity: Yes     Partners: Male     Birth control/protection: Post-menopausal, Surgical     Comment:    Lifestyle    Physical activity:     Days per week: None     Minutes per session: None    Stress: None   Relationships    Social connections:     Talks on phone: None     Gets together: None     Attends Mosque service: None     Active member of club or organization: None     Attends meetings of clubs or organizations: None     Relationship status: None    Intimate partner violence:     Fear of current or ex partner: None     Emotionally abused: None     Physically abused: None     Forced sexual activity: None   Other Topics Concern    None   Social History Narrative    None       Family History   Problem Relation Age of Onset    Heart failure Mother     Breast cancer Mother 78    Hypertension Father     Stroke Father     No Known Problems Brother     No Known Problems Brother     No Known Problems Daughter     No Known Problems Maternal Grandmother     Cancer Maternal Grandfather 61        chest and neck    No Known Problems Paternal Grandmother     Cancer Paternal Grandfather         unknown type and age   David Velazquez No Known Problems Daughter     Stomach cancer Maternal Aunt 46    Colon cancer Maternal Uncle 71    Lung cancer Maternal Aunt 50       Allergies   Allergen Reactions    Metoclopramide Myalgia     Reaction Date: [id number]; [facility] - [id number]: hand cramps and biting of cheeks         Current Outpatient Medications:     aspirin 81 MG tablet, Take by mouth, Disp: , Rfl:     Calcium Citrate-Vitamin D (CALCIUM CITRATE + D) 250-200 MG-UNIT TABS, Take by mouth, Disp: , Rfl:     HM VITAMIN D3 2000 units CAPS, Take by mouth, Disp: , Rfl:     multivitamin (THERAGRAN) TABS, Take 1 tablet by mouth, Disp: , Rfl:     pyridoxine (VITAMIN B6) 100 mg tablet, Take by mouth, Disp: , Rfl:     spironolactone (ALDACTONE) 50 mg tablet, , Disp: , Rfl:     traMADol (ULTRAM) 50 mg tablet, Take 1 tablet (50 mg total) by mouth every 8 (eight) hours as needed for moderate pain (Patient not taking: Reported on 9/3/2019), Disp: 15 tablet, Rfl: 0      Physical Exam:  /74 (BP Location: Left arm)   Pulse 92   Temp 98 6 °F (37 °C) (Tympanic)   Resp 16   Ht 5' 3" (1 6 m)   Wt 64 kg (141 lb)   SpO2 96%   BMI 24 98 kg/m²     Physical Exam   Constitutional: She is oriented to person, place, and time  She appears well-developed and well-nourished  No distress  HENT:   Head: Normocephalic and atraumatic  Eyes: Conjunctivae are normal  No scleral icterus  Neck: Normal range of motion  Neck supple  Cardiovascular: Normal rate, regular rhythm and normal heart sounds  No murmur heard  Pulmonary/Chest: Effort normal and breath sounds normal  No respiratory distress  Abdominal: Soft  There is no tenderness  Musculoskeletal: Normal range of motion  She exhibits no edema or tenderness  Lymphadenopathy:     She has no cervical adenopathy  Neurological: She is alert and oriented to person, place, and time  No cranial nerve deficit  Skin: Skin is warm and dry  Psychiatric: She has a normal mood and affect         Labs:  Lab Results   Component Value Date    WBC 6 65 07/22/2019    HGB 14 0 07/22/2019    HCT 44 7 07/22/2019     (H) 07/22/2019     07/22/2019     Lab Results   Component Value Date    K 5 3 07/22/2019  07/22/2019    CO2 33 (H) 07/22/2019    BUN 27 (H) 07/22/2019    CREATININE 0 83 07/22/2019    CALCIUM 10 0 07/22/2019    AST 34 07/22/2019    ALT 39 07/22/2019    ALKPHOS 66 07/22/2019    EGFR 69 07/22/2019     Patient voiced understanding and agreement in the above discussion  Aware to contact our office with questions/symptoms in the interim  I have spent 40 minutes with Patient  today in which greater than 50% of this time was spent in counseling/coordination of care regarding Diagnostic results, Prognosis, Risks and benefits of tx options, Intructions for management, Patient and family education and Impressions

## 2019-09-18 NOTE — PATIENT INSTRUCTIONS
Tumor marker (CA 27 29) to be done in the next few weeks  DEXA scan (bone density scan) in the next few weeks/months as well for baseline

## 2019-09-20 ENCOUNTER — APPOINTMENT (OUTPATIENT)
Dept: RADIATION ONCOLOGY | Facility: CLINIC | Age: 74
End: 2019-09-20
Attending: RADIOLOGY
Payer: COMMERCIAL

## 2019-09-20 PROCEDURE — 77280 THER RAD SIMULAJ FIELD SMPL: CPT | Performed by: RADIOLOGY

## 2019-09-25 ENCOUNTER — APPOINTMENT (OUTPATIENT)
Dept: RADIATION ONCOLOGY | Facility: CLINIC | Age: 74
End: 2019-09-25
Attending: RADIOLOGY
Payer: COMMERCIAL

## 2019-09-25 PROCEDURE — 77387 GUIDANCE FOR RADJ TX DLVR: CPT | Performed by: RADIOLOGY

## 2019-09-25 PROCEDURE — 77412 RADIATION TX DELIVERY LVL 3: CPT | Performed by: RADIOLOGY

## 2019-09-26 ENCOUNTER — APPOINTMENT (OUTPATIENT)
Dept: RADIATION ONCOLOGY | Facility: CLINIC | Age: 74
End: 2019-09-26
Attending: RADIOLOGY
Payer: COMMERCIAL

## 2019-09-26 PROCEDURE — 77412 RADIATION TX DELIVERY LVL 3: CPT | Performed by: RADIOLOGY

## 2019-09-26 PROCEDURE — 77387 GUIDANCE FOR RADJ TX DLVR: CPT | Performed by: RADIOLOGY

## 2019-09-27 ENCOUNTER — APPOINTMENT (OUTPATIENT)
Dept: RADIATION ONCOLOGY | Facility: CLINIC | Age: 74
End: 2019-09-27
Attending: RADIOLOGY
Payer: COMMERCIAL

## 2019-09-27 PROCEDURE — 77387 GUIDANCE FOR RADJ TX DLVR: CPT | Performed by: RADIOLOGY

## 2019-09-27 PROCEDURE — 77412 RADIATION TX DELIVERY LVL 3: CPT | Performed by: RADIOLOGY

## 2019-09-30 ENCOUNTER — APPOINTMENT (OUTPATIENT)
Dept: RADIATION ONCOLOGY | Facility: CLINIC | Age: 74
End: 2019-09-30
Attending: RADIOLOGY
Payer: COMMERCIAL

## 2019-09-30 ENCOUNTER — HOSPITAL ENCOUNTER (OUTPATIENT)
Dept: RADIOLOGY | Facility: IMAGING CENTER | Age: 74
Discharge: HOME | End: 2019-09-30
Payer: COMMERCIAL

## 2019-09-30 DIAGNOSIS — Z13.820 SCREENING FOR OSTEOPOROSIS: ICD-10-CM

## 2019-09-30 PROCEDURE — 77331 SPECIAL RADIATION DOSIMETRY: CPT | Performed by: RADIOLOGY

## 2019-09-30 PROCEDURE — 77412 RADIATION TX DELIVERY LVL 3: CPT | Performed by: RADIOLOGY

## 2019-09-30 PROCEDURE — 77080 DXA BONE DENSITY AXIAL: CPT

## 2019-09-30 PROCEDURE — 77387 GUIDANCE FOR RADJ TX DLVR: CPT | Performed by: RADIOLOGY

## 2019-10-01 ENCOUNTER — APPOINTMENT (OUTPATIENT)
Dept: RADIATION ONCOLOGY | Facility: CLINIC | Age: 74
End: 2019-10-01
Attending: RADIOLOGY
Payer: COMMERCIAL

## 2019-10-01 DIAGNOSIS — C50.412 MALIGNANT NEOPLASM OF UPPER-OUTER QUADRANT OF LEFT FEMALE BREAST, UNSPECIFIED ESTROGEN RECEPTOR STATUS (HCC): Primary | ICD-10-CM

## 2019-10-01 PROCEDURE — 77336 RADIATION PHYSICS CONSULT: CPT | Performed by: RADIOLOGY

## 2019-10-01 PROCEDURE — 77412 RADIATION TX DELIVERY LVL 3: CPT | Performed by: RADIOLOGY

## 2019-10-01 PROCEDURE — 77387 GUIDANCE FOR RADJ TX DLVR: CPT | Performed by: RADIOLOGY

## 2019-10-01 RX ORDER — ANASTROZOLE 1 MG/1
1 TABLET ORAL DAILY
COMMUNITY
End: 2019-10-15 | Stop reason: SDUPTHER

## 2019-10-02 ENCOUNTER — APPOINTMENT (OUTPATIENT)
Dept: RADIATION ONCOLOGY | Facility: CLINIC | Age: 74
End: 2019-10-02
Attending: RADIOLOGY
Payer: COMMERCIAL

## 2019-10-02 PROCEDURE — 77417 THER RADIOLOGY PORT IMAGE(S): CPT | Performed by: RADIOLOGY

## 2019-10-02 PROCEDURE — 77387 GUIDANCE FOR RADJ TX DLVR: CPT | Performed by: RADIOLOGY

## 2019-10-02 PROCEDURE — 77412 RADIATION TX DELIVERY LVL 3: CPT | Performed by: RADIOLOGY

## 2019-10-03 ENCOUNTER — APPOINTMENT (OUTPATIENT)
Dept: RADIATION ONCOLOGY | Facility: CLINIC | Age: 74
End: 2019-10-03
Attending: RADIOLOGY
Payer: COMMERCIAL

## 2019-10-03 PROCEDURE — 77412 RADIATION TX DELIVERY LVL 3: CPT | Performed by: RADIOLOGY

## 2019-10-03 PROCEDURE — 77387 GUIDANCE FOR RADJ TX DLVR: CPT | Performed by: RADIOLOGY

## 2019-10-04 ENCOUNTER — APPOINTMENT (OUTPATIENT)
Dept: RADIATION ONCOLOGY | Facility: CLINIC | Age: 74
End: 2019-10-04
Attending: RADIOLOGY
Payer: COMMERCIAL

## 2019-10-04 PROCEDURE — 77387 GUIDANCE FOR RADJ TX DLVR: CPT | Performed by: RADIOLOGY

## 2019-10-04 PROCEDURE — 77412 RADIATION TX DELIVERY LVL 3: CPT | Performed by: RADIOLOGY

## 2019-10-07 ENCOUNTER — APPOINTMENT (OUTPATIENT)
Dept: RADIATION ONCOLOGY | Facility: CLINIC | Age: 74
End: 2019-10-07
Attending: RADIOLOGY
Payer: COMMERCIAL

## 2019-10-07 ENCOUNTER — APPOINTMENT (OUTPATIENT)
Dept: LAB | Facility: CLINIC | Age: 74
End: 2019-10-07
Attending: RADIOLOGY
Payer: COMMERCIAL

## 2019-10-07 DIAGNOSIS — C50.412 MALIGNANT NEOPLASM OF UPPER-OUTER QUADRANT OF LEFT FEMALE BREAST, UNSPECIFIED ESTROGEN RECEPTOR STATUS (HCC): ICD-10-CM

## 2019-10-07 LAB
BASOPHILS # BLD AUTO: 0.01 THOUSANDS/ΜL (ref 0–0.1)
BASOPHILS NFR BLD AUTO: 0 % (ref 0–1)
EOSINOPHIL # BLD AUTO: 0.17 THOUSAND/ΜL (ref 0–0.61)
EOSINOPHIL NFR BLD AUTO: 3 % (ref 0–6)
ERYTHROCYTE [DISTWIDTH] IN BLOOD BY AUTOMATED COUNT: 12.4 % (ref 11.6–15.1)
HCT VFR BLD AUTO: 40.8 % (ref 34.8–46.1)
HGB BLD-MCNC: 13.4 G/DL (ref 11.5–15.4)
LYMPHOCYTES # BLD AUTO: 0.93 THOUSANDS/ΜL (ref 0.6–4.47)
LYMPHOCYTES NFR BLD AUTO: 14 % (ref 14–44)
MCH RBC QN AUTO: 31.9 PG (ref 26.8–34.3)
MCHC RBC AUTO-ENTMCNC: 32.8 G/DL (ref 31.4–37.4)
MCV RBC AUTO: 97 FL (ref 82–98)
MONOCYTES # BLD AUTO: 0.63 THOUSAND/ΜL (ref 0.17–1.22)
MONOCYTES NFR BLD AUTO: 10 % (ref 4–12)
NEUTROPHILS # BLD AUTO: 4.76 THOUSANDS/ΜL (ref 1.85–7.62)
NEUTS SEG NFR BLD AUTO: 73 % (ref 43–75)
PLATELET # BLD AUTO: 247 THOUSANDS/UL (ref 149–390)
PMV BLD AUTO: 9.8 FL (ref 8.9–12.7)
RBC # BLD AUTO: 4.2 MILLION/UL (ref 3.81–5.12)
WBC # BLD AUTO: 6.5 THOUSAND/UL (ref 4.31–10.16)

## 2019-10-07 PROCEDURE — 85025 COMPLETE CBC W/AUTO DIFF WBC: CPT

## 2019-10-07 PROCEDURE — 36415 COLL VENOUS BLD VENIPUNCTURE: CPT

## 2019-10-07 PROCEDURE — 77387 GUIDANCE FOR RADJ TX DLVR: CPT | Performed by: RADIOLOGY

## 2019-10-07 PROCEDURE — 77412 RADIATION TX DELIVERY LVL 3: CPT | Performed by: RADIOLOGY

## 2019-10-08 ENCOUNTER — APPOINTMENT (OUTPATIENT)
Dept: RADIATION ONCOLOGY | Facility: CLINIC | Age: 74
End: 2019-10-08
Attending: RADIOLOGY
Payer: COMMERCIAL

## 2019-10-08 PROCEDURE — 77412 RADIATION TX DELIVERY LVL 3: CPT | Performed by: RADIOLOGY

## 2019-10-08 PROCEDURE — 77387 GUIDANCE FOR RADJ TX DLVR: CPT | Performed by: RADIOLOGY

## 2019-10-08 PROCEDURE — 77336 RADIATION PHYSICS CONSULT: CPT | Performed by: RADIOLOGY

## 2019-10-09 ENCOUNTER — APPOINTMENT (OUTPATIENT)
Dept: RADIATION ONCOLOGY | Facility: CLINIC | Age: 74
End: 2019-10-09
Attending: RADIOLOGY
Payer: COMMERCIAL

## 2019-10-09 PROCEDURE — 77417 THER RADIOLOGY PORT IMAGE(S): CPT | Performed by: RADIOLOGY

## 2019-10-09 PROCEDURE — 77387 GUIDANCE FOR RADJ TX DLVR: CPT | Performed by: RADIOLOGY

## 2019-10-09 PROCEDURE — 77412 RADIATION TX DELIVERY LVL 3: CPT | Performed by: RADIOLOGY

## 2019-10-10 ENCOUNTER — APPOINTMENT (OUTPATIENT)
Dept: RADIATION ONCOLOGY | Facility: CLINIC | Age: 74
End: 2019-10-10
Attending: RADIOLOGY
Payer: COMMERCIAL

## 2019-10-10 PROCEDURE — 77412 RADIATION TX DELIVERY LVL 3: CPT | Performed by: RADIOLOGY

## 2019-10-10 PROCEDURE — 77387 GUIDANCE FOR RADJ TX DLVR: CPT | Performed by: RADIOLOGY

## 2019-10-11 ENCOUNTER — APPOINTMENT (OUTPATIENT)
Dept: RADIATION ONCOLOGY | Facility: CLINIC | Age: 74
End: 2019-10-11
Attending: RADIOLOGY
Payer: COMMERCIAL

## 2019-10-11 PROCEDURE — 77387 GUIDANCE FOR RADJ TX DLVR: CPT | Performed by: RADIOLOGY

## 2019-10-11 PROCEDURE — 77334 RADIATION TREATMENT AID(S): CPT | Performed by: RADIOLOGY

## 2019-10-11 PROCEDURE — 77290 THER RAD SIMULAJ FIELD CPLX: CPT | Performed by: RADIOLOGY

## 2019-10-11 PROCEDURE — 77412 RADIATION TX DELIVERY LVL 3: CPT | Performed by: RADIOLOGY

## 2019-10-14 ENCOUNTER — APPOINTMENT (OUTPATIENT)
Dept: RADIATION ONCOLOGY | Facility: CLINIC | Age: 74
End: 2019-10-14
Attending: RADIOLOGY
Payer: COMMERCIAL

## 2019-10-14 PROCEDURE — 77387 GUIDANCE FOR RADJ TX DLVR: CPT | Performed by: RADIOLOGY

## 2019-10-14 PROCEDURE — 77295 3-D RADIOTHERAPY PLAN: CPT | Performed by: RADIOLOGY

## 2019-10-14 PROCEDURE — 77334 RADIATION TREATMENT AID(S): CPT | Performed by: RADIOLOGY

## 2019-10-14 PROCEDURE — 77300 RADIATION THERAPY DOSE PLAN: CPT | Performed by: RADIOLOGY

## 2019-10-14 PROCEDURE — 77412 RADIATION TX DELIVERY LVL 3: CPT | Performed by: RADIOLOGY

## 2019-10-15 ENCOUNTER — APPOINTMENT (OUTPATIENT)
Dept: RADIATION ONCOLOGY | Facility: CLINIC | Age: 74
End: 2019-10-15
Attending: RADIOLOGY
Payer: COMMERCIAL

## 2019-10-15 ENCOUNTER — TELEPHONE (OUTPATIENT)
Dept: HEMATOLOGY ONCOLOGY | Facility: CLINIC | Age: 74
End: 2019-10-15

## 2019-10-15 DIAGNOSIS — C50.412 MALIGNANT NEOPLASM OF UPPER-OUTER QUADRANT OF LEFT BREAST IN FEMALE, ESTROGEN RECEPTOR POSITIVE (HCC): Primary | ICD-10-CM

## 2019-10-15 DIAGNOSIS — Z17.0 MALIGNANT NEOPLASM OF UPPER-OUTER QUADRANT OF LEFT BREAST IN FEMALE, ESTROGEN RECEPTOR POSITIVE (HCC): ICD-10-CM

## 2019-10-15 DIAGNOSIS — C50.412 MALIGNANT NEOPLASM OF UPPER-OUTER QUADRANT OF LEFT BREAST IN FEMALE, ESTROGEN RECEPTOR POSITIVE (HCC): ICD-10-CM

## 2019-10-15 DIAGNOSIS — Z17.0 MALIGNANT NEOPLASM OF UPPER-OUTER QUADRANT OF LEFT BREAST IN FEMALE, ESTROGEN RECEPTOR POSITIVE (HCC): Primary | ICD-10-CM

## 2019-10-15 PROCEDURE — 77412 RADIATION TX DELIVERY LVL 3: CPT | Performed by: RADIOLOGY

## 2019-10-15 PROCEDURE — 77387 GUIDANCE FOR RADJ TX DLVR: CPT | Performed by: RADIOLOGY

## 2019-10-15 PROCEDURE — 77336 RADIATION PHYSICS CONSULT: CPT | Performed by: RADIOLOGY

## 2019-10-15 RX ORDER — ANASTROZOLE 1 MG/1
1 TABLET ORAL DAILY
Qty: 90 TABLET | Refills: 2 | Status: SHIPPED | OUTPATIENT
Start: 2019-10-15 | End: 2019-12-18 | Stop reason: SDUPTHER

## 2019-10-15 NOTE — TELEPHONE ENCOUNTER
Patient called in to let you know that the 30 day supply is done  She said she has been taking since 09/23, the anastrzole  Her side effects are mild, she states she is fine  She said joints in the ankles hurts more, comes and goes, hot flashes are even hotter, shoulder joint pain but nothing stays constant  Comes and goes within a matter of hours, denies nausea vomiting  She stated that as long as its ok with you, its ok to do a 90 day supply  If you have any questions, you can reach her on [phone] until 430, then will be home after that

## 2019-10-15 NOTE — TELEPHONE ENCOUNTER
I called and LVM for patient letting her know that the refill has been sent to [facility] and she is to call us if she has any questions

## 2019-10-16 ENCOUNTER — APPOINTMENT (OUTPATIENT)
Dept: RADIATION ONCOLOGY | Facility: CLINIC | Age: 74
End: 2019-10-16
Attending: RADIOLOGY
Payer: COMMERCIAL

## 2019-10-16 PROCEDURE — 77387 GUIDANCE FOR RADJ TX DLVR: CPT | Performed by: RADIOLOGY

## 2019-10-16 PROCEDURE — 77412 RADIATION TX DELIVERY LVL 3: CPT | Performed by: RADIOLOGY

## 2019-10-17 ENCOUNTER — APPOINTMENT (OUTPATIENT)
Dept: RADIATION ONCOLOGY | Facility: CLINIC | Age: 74
End: 2019-10-17
Attending: RADIOLOGY
Payer: COMMERCIAL

## 2019-10-17 PROCEDURE — 77412 RADIATION TX DELIVERY LVL 3: CPT | Performed by: RADIOLOGY

## 2019-10-17 PROCEDURE — 77331 SPECIAL RADIATION DOSIMETRY: CPT | Performed by: RADIOLOGY

## 2019-10-18 ENCOUNTER — APPOINTMENT (OUTPATIENT)
Dept: RADIATION ONCOLOGY | Facility: CLINIC | Age: 74
End: 2019-10-18
Attending: RADIOLOGY
Payer: COMMERCIAL

## 2019-10-18 PROCEDURE — 77412 RADIATION TX DELIVERY LVL 3: CPT | Performed by: RADIOLOGY

## 2019-10-21 ENCOUNTER — APPOINTMENT (OUTPATIENT)
Dept: RADIATION ONCOLOGY | Facility: CLINIC | Age: 74
End: 2019-10-21
Attending: RADIOLOGY
Payer: COMMERCIAL

## 2019-10-21 PROCEDURE — 77412 RADIATION TX DELIVERY LVL 3: CPT | Performed by: RADIOLOGY

## 2019-10-22 ENCOUNTER — APPOINTMENT (OUTPATIENT)
Dept: RADIATION ONCOLOGY | Facility: CLINIC | Age: 74
End: 2019-10-22
Attending: RADIOLOGY
Payer: COMMERCIAL

## 2019-10-22 PROCEDURE — 77412 RADIATION TX DELIVERY LVL 3: CPT | Performed by: RADIOLOGY

## 2019-10-22 PROCEDURE — 77336 RADIATION PHYSICS CONSULT: CPT | Performed by: RADIOLOGY

## 2019-10-23 ENCOUNTER — APPOINTMENT (OUTPATIENT)
Dept: RADIATION ONCOLOGY | Facility: CLINIC | Age: 74
End: 2019-10-23
Attending: RADIOLOGY
Payer: COMMERCIAL

## 2019-10-23 PROCEDURE — 77412 RADIATION TX DELIVERY LVL 3: CPT | Performed by: RADIOLOGY

## 2019-11-05 ENCOUNTER — OFFICE VISIT (OUTPATIENT)
Dept: SURGICAL ONCOLOGY | Facility: CLINIC | Age: 74
End: 2019-11-05
Payer: COMMERCIAL

## 2019-11-05 VITALS
SYSTOLIC BLOOD PRESSURE: 100 MMHG | DIASTOLIC BLOOD PRESSURE: 60 MMHG | RESPIRATION RATE: 16 BRPM | BODY MASS INDEX: 24.66 KG/M2 | TEMPERATURE: 97.6 F | WEIGHT: 139.2 LBS | HEART RATE: 86 BPM | HEIGHT: 63 IN

## 2019-11-05 DIAGNOSIS — Z79.811 USE OF ANASTROZOLE: ICD-10-CM

## 2019-11-05 DIAGNOSIS — C50.412 MALIGNANT NEOPLASM OF UPPER-OUTER QUADRANT OF LEFT BREAST IN FEMALE, ESTROGEN RECEPTOR POSITIVE (HCC): Primary | ICD-10-CM

## 2019-11-05 DIAGNOSIS — Z92.3 S/P RADIOTHERAPY: ICD-10-CM

## 2019-11-05 DIAGNOSIS — Z17.0 MALIGNANT NEOPLASM OF UPPER-OUTER QUADRANT OF LEFT BREAST IN FEMALE, ESTROGEN RECEPTOR POSITIVE (HCC): Primary | ICD-10-CM

## 2019-11-05 PROBLEM — N64.89 HEMATOMA OF BREAST: Status: RESOLVED | Noted: 2019-08-19 | Resolved: 2019-11-05

## 2019-11-05 PROCEDURE — 99214 OFFICE O/P EST MOD 30 MIN: CPT | Performed by: SURGERY

## 2019-11-05 NOTE — PROGRESS NOTES
Surgical Oncology Follow Up       [facility] SURGICAL ONCOLOGY [facility] [id number]    Hal Zuleta  1945  [id number]  [address]  [facility] SURGICAL ONCOLOGY [facility]    Chief Complaint   Patient presents with    Follow-up       Assessment/Plan   Diagnoses and all orders for this visit:    Malignant neoplasm of upper-outer quadrant of left breast in female, estrogen receptor positive ([facility] [address] )  -     Mammo diagnostic bilateral w 3d & cad; Future    S/P radiotherapy    Use of anastrozole        Advance Care Planning/Advance Directives:  Discussed disease status, cancer treatment plans and/or cancer treatment goals with the patient  Oncology History:       Malignant neoplasm of upper-outer quadrant of left breast in female, estrogen receptor positive ([facility] [address] )    5/30/2019 Initial Diagnosis     Breast cancer ([facility] )      5/30/2019 Biopsy     Left breast, ultrasound-guided biopsy, 200 8cmfn 4 passes 12g Marquee:  - Invasive mammary carcinoma of no special type (ductal, not otherwise specified)    - grade 2  - %  - %  - HER2 by FISH negative      6/2019 Genetic Testing     BRCA negative      7/8/2019 -  Cancer Staged     Staging form: Breast, AJCC 8th Edition  - Clinical: Stage IB (cT2, cN0, cM0, G2, ER+, WI+, HER2-) - Signed by Kevin Whitfield MD on 7/8/2019  Laterality: Left  Method of lymph node assessment: Clinical  Histologic grading system: 3 grade system        7/26/2019 Surgery     Left breast lumpectomy with SLN biopsy:  - Invasive mammary carcinoma, 2 7 cm, grade 2  - approximately 80% of tumor is DCIS  - LN's - one lymph node with metastatic carcinoma, 2 1mm deposit, no extranodal extention  - + LVI  - margins negative      8/12/2019 -  Cancer Staged     Staging form: Breast, AJCC 8th Edition  - Pathologic: Stage IB (pT2, pN1(sn), cM0, G2, ER+, WI+, HER2-) - Signed by Dilan Bhatti Monalisa Lux MD on 8/12/2019  Neoadjuvant therapy: No  Laterality: Left  Method of lymph node assessment: Sarepta lymph node biopsy  Histologic grading system: 3 grade system          Ovarian cancer ([facility] )    2005 Initial Diagnosis     Ovarian cancer [facility])      2005 Surgery     MARCO/BSO    Dr Dilip Ramirez      2005 -  Chemotherapy     Dr Dilip Ramirez         History of Present Illness:  Breast cancer follow-up, completed radiation therapy and started anastrozole, no concerns  -Interval History:  As noted    Review of Systems:  Review of Systems   Constitutional: Negative  Negative for appetite change and fever  Eyes: Negative  Respiratory: Negative for shortness of breath  Cardiovascular: Negative  Gastrointestinal: Negative  Endocrine: Negative  Genitourinary: Negative  Musculoskeletal: Negative  Negative for arthralgias and myalgias  Skin: Negative  Allergic/Immunologic: Negative  Neurological: Negative  Hematological: Negative  Negative for adenopathy  Does not bruise/bleed easily  Psychiatric/Behavioral: Negative          Patient Active Problem List   Diagnosis    Malignant neoplasm of upper-outer quadrant of left breast in female, estrogen receptor positive ([facility] )    BRCA negative    [facility] )    S/P radiotherapy    Use of anastrozole     Past Medical History:   Diagnosis Date    Basal cell carcinoma     Bulbar polio     Colon polyp     Curvature of spine     Exercise involving walking     "at work alot"    History of anemia as a child     "after polio"    Lymphedema     left leg/"after abdominal lymph nodes removed"/wears thigh high compresion stocking"    Macular degeneration     "and slight glaucoma per eye doctor both eyes"    Muscle weakness     "from polio, predominantly right side"    Osteopenia     Ovarian cancer ([facility] [address] ) 2005    radical hysterectomy w/ chemo    Scoliosis     with right hip pain occas    Swallowing difficulty     "at times since polio"    Thyroid nodule     Wears glasses      Past Surgical History:   Procedure Laterality Date    ABDOMINAL HYSTERECTOMY N/A     ovarian cancer    BOWEL RESECTION      BREAST BIOPSY Left 2015    BREAST BIOPSY Right 2014    BREAST BIOPSY Left     [facility]SY Left 2019    [facility]    BREAST LUMPECTOMY Left 2019    Procedure: LUMPECTOMY BREAST NEEDLE LOC at 1100;  Surgeon: Joseph Owens MD;  [facility];  Service: Surgical Oncology    CATARACT EXTRACTION Right      SECTION      COLONOSCOPY      HYSTERECTOMY      age 56    LYMPH NODE BIOPSY Left 2019    Procedure: SENTINEL NODE BIOPSY- NUC MED INJ at 1200;  Surgeon: Joseph Owens MD;  [facility];  Service: Surgical Oncology    LYMPHADENECTOMY      MAMMO NEEDLE LOCALIZATION LEFT (ALL INC) Left 2019    SKIN CANCER EXCISION      US GUIDED BREAST BIOPSY LEFT COMPLETE Left 2019     Family History   Problem Relation Age of Onset    Heart failure Mother     Breast cancer Mother 78    Hypertension Father     Stroke Father     No Known Problems Brother     No Known Problems Brother     No Known Problems Daughter     No Known Problems Maternal Grandmother     Cancer Maternal Grandfather 61        chest and neck    No Known Problems Paternal Grandmother     Cancer Paternal Grandfather         unknown type and age   [facility] No Known Problems Daughter     Stomach cancer Maternal Aunt 46    Colon cancer Maternal Uncle 71    Lung cancer Maternal Aunt 48     Social History     Socioeconomic History    Marital status: /Civil Union     Spouse name: Not on file    Number of children: Not on file    Years of education: Not on file    Highest education level: Not on file   Occupational History    Not on file   Social Needs    Financial resource strain: Not on file    Food insecurity:     Worry: Not on file     Inability: Not on file    Transportation needs:     Medical: Not on file Non-medical: Not on file   Tobacco Use    Smoking status: Former Smoker     Packs/day: 0 50     Years: 45 00     Pack years: 22 50     Last attempt to quit:      Years since quittin 8    Smokeless tobacco: Never Used    Tobacco comment: Quit   Substance and Sexual Activity    Alcohol use:  Yes     Alcohol/week: 1 0 standard drinks     Types: 1 Glasses of wine per week     Frequency: Monthly or less     Comment: occasional    Drug use: No    Sexual activity: Yes     Partners: Male     Birth control/protection: Post-menopausal, Surgical     Comment:    Lifestyle    Physical activity:     Days per week: Not on file     Minutes per session: Not on file    Stress: Not on file   Relationships    Social connections:     Talks on phone: Not on file     Gets together: Not on file     Attends Zoroastrianism service: Not on file     Active member of club or organization: Not on file     Attends meetings of clubs or organizations: Not on file     Relationship status: Not on file    Intimate partner violence:     Fear of current or ex partner: Not on file     Emotionally abused: Not on file     Physically abused: Not on file     Forced sexual activity: Not on file   Other Topics Concern    Not on file   Social History Narrative    Not on file       Current Outpatient Medications:     anastrozole (ARIMIDEX) 1 mg tablet, Take 1 tablet (1 mg total) by mouth daily, Disp: 90 tablet, Rfl: 2    aspirin 81 MG tablet, Take by mouth, Disp: , Rfl:     Calcium Citrate-Vitamin D (CALCIUM CITRATE + D) 250-200 MG-UNIT TABS, Take by mouth, Disp: , Rfl:     HM VITAMIN D3 2000 units CAPS, Take by mouth, Disp: , Rfl:     multivitamin (THERAGRAN) TABS, Take 1 tablet by mouth, Disp: , Rfl:     pyridoxine (VITAMIN B6) 100 mg tablet, Take by mouth, Disp: , Rfl:     spironolactone (ALDACTONE) 50 mg tablet, , Disp: , Rfl:     traMADol (ULTRAM) 50 mg tablet, Take 1 tablet (50 mg total) by mouth every 8 (eight) hours as needed for moderate pain (Patient not taking: Reported on 9/3/2019), Disp: 15 tablet, Rfl: 0  Allergies   Allergen Reactions    Metoclopramide Myalgia     Reaction Date: [id number]; Annotation - [id number]: hand cramps and biting of cheeks       The following portions of the patient's history were reviewed and updated as appropriate: allergies, current medications, past family history, past medical history, past social history, past surgical history and problem list         Vitals:    11/05/19 1512   BP: 100/60   Pulse: 86   Resp: 16   Temp: 97 6 °F (36 4 °C)       Physical Exam   Constitutional: She is oriented to person, place, and time  She appears well-developed and well-nourished  HENT:   Head: Normocephalic and atraumatic  Cardiovascular: Normal heart sounds  Pulmonary/Chest: Breath sounds normal  Right breast exhibits no inverted nipple, no mass, no nipple discharge, no skin change and no tenderness  Left breast exhibits skin change (Scar upper outer, resolving radiation changes) and tenderness ( mild in the lumpectomy bed)  Left breast exhibits no inverted nipple, no mass and no nipple discharge  There is breast swelling ( mild in the lumpectomy bed)  Abdominal: Soft  Lymphadenopathy:        Right axillary: No pectoral and no lateral adenopathy present  Left axillary: No pectoral and no lateral adenopathy present  Right: No supraclavicular adenopathy present  Left: No supraclavicular adenopathy present  Neurological: She is alert and oriented to person, place, and time  Psychiatric: She has a normal mood and affect  Discussion/Summary:  26-year-old female status post left breast conservation for an invasive ductal carcinoma  She had radiation therapy and is currently on Arimidex  Advised her to start breast massage to the lumpectomy site as well as entire breast   She should continue good skin care for the radiation changes    I will make arrangements for her mammogram due in May  I will see her again at that time or sooner should the need arise

## 2019-11-15 ENCOUNTER — TELEPHONE (OUTPATIENT)
Dept: OBGYN CLINIC | Facility: CLINIC | Age: 74
End: 2019-11-15

## 2019-11-18 ENCOUNTER — DOCUMENTATION (OUTPATIENT)
Dept: INFUSION CENTER | Facility: HOSPITAL | Age: 74
End: 2019-11-18

## 2019-11-18 NOTE — SOCIAL WORK
MSW received pt distress thermometer, scoring low (1)  No concerns are listed within problems list  Support services not warranted at this time

## 2019-11-26 ENCOUNTER — RADIATION ONCOLOGY FOLLOW-UP (OUTPATIENT)
Dept: RADIATION ONCOLOGY | Facility: CLINIC | Age: 74
End: 2019-11-26
Attending: RADIOLOGY
Payer: COMMERCIAL

## 2019-11-26 VITALS
HEART RATE: 89 BPM | SYSTOLIC BLOOD PRESSURE: 140 MMHG | DIASTOLIC BLOOD PRESSURE: 80 MMHG | RESPIRATION RATE: 18 BRPM | HEIGHT: 63 IN | BODY MASS INDEX: 24.43 KG/M2 | TEMPERATURE: 98.6 F | WEIGHT: 137.9 LBS

## 2019-11-26 DIAGNOSIS — Z17.0 MALIGNANT NEOPLASM OF UPPER-OUTER QUADRANT OF LEFT BREAST IN FEMALE, ESTROGEN RECEPTOR POSITIVE (HCC): Primary | ICD-10-CM

## 2019-11-26 DIAGNOSIS — C50.412 MALIGNANT NEOPLASM OF UPPER-OUTER QUADRANT OF LEFT BREAST IN FEMALE, ESTROGEN RECEPTOR POSITIVE (HCC): Primary | ICD-10-CM

## 2019-11-26 PROCEDURE — 99211 OFF/OP EST MAY X REQ PHY/QHP: CPT | Performed by: RADIOLOGY

## 2019-11-26 NOTE — PROGRESS NOTES
Roque Loredo 1945 is a 66 y o  female       Follow up visit     Roque Loredo has completed a course of adjuvant radiation therapy for Stage IB (pT2, pN1(sn), cM0, G2, ER+, ID+, HER2-)  left breast carcinoma, on 10/23/19  Overall she tolerated treatment well  She had pruritus in the upper inner quadrant for which she utilized Hydrocortisone  Nipple sensitivity for which she utilized Aquaphor  She had continued seroma which was decreasing in size by completion of treatment She was given  breast massage instructions to minimize subsequent breast fibrosis  Today she returns for first f/u  She returned to Dr Viridiana Love office, 11/5/19, and was doing well  Will return in May with Mammogram prior  She has a med Onc f/u 12/18/19  Continues on Anastrozole     Today, the patient reports feeling well after completing treatments  She states hematoma is slowly healing  She continues to do breast massages and applying remedy daily  Malignant neoplasm of upper-outer quadrant of left breast in female, estrogen receptor positive ([facility] [address] )    5/30/2019 Initial Diagnosis     Breast cancer ([facility] [address] )      5/30/2019 Biopsy     Left breast, ultrasound-guided biopsy, 200 8cmfn 4 passes 12g Marquee:  - Invasive mammary carcinoma of no special type (ductal, not otherwise specified)    - grade 2  - %  - %  - HER2 by FISH negative      6/2019 Genetic Testing     BRCA negative      7/8/2019 -  Cancer Staged     Staging form: Breast, AJCC 8th Edition  - Clinical: Stage IB (cT2, cN0, cM0, G2, ER+, ID+, HER2-) - Signed by Lawyer José MD on 7/8/2019  Laterality: Left  Method of lymph node assessment: Clinical  Histologic grading system: 3 grade system        7/26/2019 Surgery     Left breast lumpectomy with SLN biopsy:  - Invasive mammary carcinoma, 2 7 cm, grade 2  - approximately 80% of tumor is DCIS  - LN's - one lymph node with metastatic carcinoma, 2 1mm deposit, no extranodal extention  - + LVI  - margins negative      8/12/2019 -  Cancer Staged     Staging form: Breast, AJCC 8th Edition  - Pathologic: Stage IB (pT2, pN1(sn), cM0, G2, ER+, UT+, HER2-) - Signed by Abelardo Norton MD on 8/12/2019  Neoadjuvant therapy: No  Laterality: Left  Method of lymph node assessment: [place] lymph node biopsy  Histologic grading system: 3 grade system        9/25/2019 - 10/23/2019 Radiation     Plan ID Energy Fractions Dose per Fraction (cGy) Dose Correction (cGy) Total Dose Delivered (cGy) Elapsed Days   BH L Breast 6X 16 / 16 266 0 4,256 21   L Brst Boost 12E 5 / 5 200 0 1,000 6      [facility]        Ovarian cancer [facility])    2005 Initial Diagnosis     Ovarian cancer [facility])      2005 Surgery     MARCO/BSO    Dr Veronique Hinson      2005 -  Chemotherapy     Dr William Reyes: no      Imaging    Health Maintenance   Topic Date Due    CRC Screening: Colonoscopy  1945    DTaP,Tdap,and Td Vaccines (1 - Tdap) 11/12/1954    Fall Risk  11/12/2008    Urinary Incontinence Screening  11/12/2008    Pneumococcal Vaccine: 65+ Years (1 of 2 - PCV13) 11/12/2008    Depression Screening PHQ  08/16/2020    BMI: Adult  11/05/2020    Influenza Vaccine  Completed    Pneumococcal Vaccine: Pediatrics (0 to 5 Years) and At-Risk Patients (6 to 59 Years)  Aged Out    HIB Vaccine  Aged Out    Hepatitis B Vaccine  Aged Out    IPV Vaccine  Aged Out    Hepatitis A Vaccine  Aged Out    Meningococcal ACWY Vaccine  Aged Out    HPV Vaccine  Aged Out       Patient Active Problem List   Diagnosis    Malignant neoplasm of upper-outer quadrant of left breast in female, estrogen receptor positive ([address] )    BRCA negative    Ovar[facility] [address] )    S/P radiotherapy    Use of anastrozole     Past Medical History:   Diagnosis Date    Basal cell carcinoma     Bulbar polio     Colon polyp     Curvature of spine     Exercise involving walking     "at work alot"    History of anemia as a child     "after polio"    Lymphedema     left leg/"after abdominal lymph nodes removed"/wears thigh high compresion stocking"    Macular degeneration     "and slight glaucoma per eye doctor both eyes"    Muscle weakness     "from polio, predominantly right side"    Osteopenia     Ovarian cancer ([address] ) 2005    radical hysterectomy w/ chemo    Scoliosis     with right hip pain occas    Swallowing difficulty     "at times since polio"    Thyroid nodule     Wears glasses      Past Surgical History:   Procedure Laterality Date    ABDOMINAL HYSTERECTOMY N/A     ovarian cancer    BOWEL RESECTION      BREAST BIOPSY Left 2015    BREAST BIOPSY Right 2014    BREAST BIOPSY Left     BREAST BIOPSY Left 2019    [facility]    BREAST LUMPECTOMY Left 2019    Procedure: LUMPECTOMY BREAST NEEDLE LOC at 1100;  Surgeon: Nikko Hammonds MD;  [facility];  Service: Surgical Oncology    CATARACT EXTRACTION Right      SECTION      COLONOSCOPY      HYSTERECTOMY      age 56   Judithe Spruce LYMPH NODE BIOPSY Left 2019    Procedure: SENTINEL NODE BIOPSY- [address] at 1200;  Surgeon: Nikko Hammonds MD;  [facility];  Service: Surgical Oncology    LYMPHADENECTOMY      MAMMO NEEDLE LOCALIZATION LEFT (ALL INC) Left 2019    SKIN CANCER EXCISION      US GUIDED BREAST BIOPSY LEFT COMPLETE Left 2019     Family History   Problem Relation Age of Onset    Heart failure Mother     Breast cancer Mother 78    Hypertension Father     Stroke Father     No Known Problems Brother     No Known Problems Brother     No Known Problems Daughter     No Known Problems Maternal Grandmother     Cancer Maternal Grandfather 61        chest and neck    No Known Problems Paternal Grandmother     Cancer Paternal Grandfather         unknown type and age   Judithe Spruce No Known Problems Daughter     Stomach cancer Maternal Aunt 46    Colon cancer Maternal Uncle 71    Lung cancer Maternal Aunt 48     Social History     Socioeconomic History    Marital status: /Civil Union     Spouse name: Not on file    Number of children: Not on file    Years of education: Not on file    Highest education level: Not on file   Occupational History    Not on file   Social Needs    Financial resource strain: Not on file    Food insecurity:     Worry: Not on file     Inability: Not on file    Transportation needs:     Medical: Not on file     Non-medical: Not on file   Tobacco Use    Smoking status: Former Smoker     Packs/day: 0 50     Years: 45 00     Pack years: 22 50     Last attempt to quit:      Years since quittin 9    Smokeless tobacco: Never Used    Tobacco comment: Quit   Substance and Sexual Activity    Alcohol use:  Yes     Alcohol/week: 1 0 standard drinks     Types: 1 Glasses of wine per week     Frequency: Monthly or less     Comment: occasional    Drug use: No    Sexual activity: Yes     Partners: Male     Birth control/protection: Post-menopausal, Surgical     Comment:    Lifestyle    Physical activity:     Days per week: Not on file     Minutes per session: Not on file    Stress: Not on file   Relationships    Social connections:     Talks on phone: Not on file     Gets together: Not on file     Attends [employer] service: Not on file     Active member of club or organization: Not on file     Attends meetings of clubs or organizations: Not on file     Relationship status: Not on file    Intimate partner violence:     Fear of current or ex partner: Not on file     Emotionally abused: Not on file     Physically abused: Not on file     Forced sexual activity: Not on file   Other Topics Concern    Not on file   Social History Narrative    Not on file       Current Outpatient Medications:     anastrozole (ARIMIDEX) 1 mg tablet, Take 1 tablet (1 mg total) by mouth daily, Disp: 90 tablet, Rfl: 2    aspirin 81 MG tablet, Take by mouth, Disp: , Rfl:     Calcium Citrate-Vitamin D (CALCIUM CITRATE + D) 250-200 MG-UNIT TABS, Take by mouth, Disp: , Rfl:      VITAMIN D3 2000 units CAPS, Take by mouth, Disp: , Rfl:     multivitamin (THERAGRAN) TABS, Take 1 tablet by mouth, Disp: , Rfl:     pyridoxine (VITAMIN B6) 100 mg tablet, Take by mouth, Disp: , Rfl:     spironolactone (ALDACTONE) 50 mg tablet, , Disp: , Rfl:     traMADol (ULTRAM) 50 mg tablet, Take 1 tablet (50 mg total) by mouth every 8 (eight) hours as needed for moderate pain (Patient not taking: Reported on 9/3/2019), Disp: 15 tablet, Rfl: 0  Allergies   Allergen Reactions    Metoclopramide Myalgia     Reaction Date: 03Jun2011; Annotation - [id number]: hand cramps and biting of cheeks       Review of Systems:  Review of Systems   Constitutional: Negative  HENT: Negative  Eyes: Negative  Respiratory: Negative  Cardiovascular: Negative  Gastrointestinal: Negative  Endocrine: Negative  Genitourinary: Negative  Musculoskeletal: Negative  Occasional Hip and ankle pain   Skin: Negative  Allergic/Immunologic: Negative  Neurological: Negative  Hematological: Negative  Psychiatric/Behavioral: Negative  Vitals:    11/26/19 1309   Resp: 18   Height: 5' 3" (1 6 m)        Pain assessment:2           Imaging:No results found      Teaching

## 2019-11-26 NOTE — PROGRESS NOTES
Follow-up - Radiation Oncology   James Akins 1945 66 y o  female [id number]      History of Present Illness   Cancer Staging  Malignant neoplasm of upper-outer quadrant of left breast in female, estrogen receptor positive ([facility] [address] )  Staging form: Breast, AJCC 8th Edition  - Clinical: Stage IB (cT2, cN0, cM0, G2, ER+, AK+, HER2-) - Signed by Abelardo Norton MD on 7/8/2019  Laterality: Left  Method of lymph node assessment: Clinical  Histologic grading system: 3 grade system  - Pathologic: Stage IB (pT2, pN1(sn), cM0, G2, ER+, AK+, HER2-) - Signed by Abelardo Norton MD on 8/12/2019  Neoadjuvant therapy: No  Laterality: Left  Method of lymph node assessment: [place] lymph node biopsy  Histologic grading system: 3 grade system        Interval History:  Glenn Leon has completed a course of adjuvant radiation therapy for Stage IB (pT2, pN1(sn), cM0, G2, ER+, AK+, HER2-)  left breast carcinoma, on 10/23/19  Overall she tolerated treatment well  She had pruritus in the upper inner quadrant for which she utilized Hydrocortisone  Nipple sensitivity for which she utilized Aquaphor  She had continued seroma which was decreasing in size by completion of treatment She was given  breast massage instructions to minimize subsequent breast fibrosis  Today she returns for first f/u       She returned to Dr Eliot Rebolledo office, 11/5/19, and was doing well  Will return in May with Mammogram prior  She has a med Onc f/u 12/18/19  Continues on Anastrozole      Today, the patient reports feeling well after completing treatments  She states hematoma is slowly healing  She continues to do breast massages and applying remedy daily        Historical Information      Malignant neoplasm of upper-outer quadrant of left breast in female, estrogen receptor positive ([facility] [address] )    5/30/2019 Initial Diagnosis     Breast cancer ([facility] [address] )      5/30/2019 Biopsy     Left breast, ultrasound-guided biopsy, 200 8cmfn 4 passes 12g Vee:  - Invasive mammary carcinoma of no special type (ductal, not otherwise specified)    - grade 2  - %  - %  - HER2 by FISH negative      6/2019 Genetic Testing     BRCA negative      7/8/2019 -  Cancer Staged     Staging form: Breast, AJCC 8th Edition  - Clinical: Stage IB (cT2, cN0, cM0, G2, ER+, OR+, HER2-) - Signed by Marion Hurtado MD on 7/8/2019  Laterality: Left  Method of lymph node assessment: Clinical  Histologic grading system: 3 grade system        7/26/2019 Surgery     Left breast lumpectomy with SLN biopsy:  - Invasive mammary carcinoma, 2 7 cm, grade 2  - approximately 80% of tumor is DCIS  - LN's - one lymph node with metastatic carcinoma, 2 1mm deposit, no extranodal extention  - + LVI  - margins negative      8/12/2019 -  Cancer Staged     Staging form: Breast, AJCC 8th Edition  - Pathologic: Stage IB (pT2, pN1(sn), cM0, G2, ER+, OR+, HER2-) - Signed by Marion Hurtado MD on 8/12/2019  Neoadjuvant therapy: No  Laterality: Left  Method of lymph node assessment: [place] lymph node biopsy  Histologic grading system: 3 grade system        9/25/2019 - 10/23/2019 Radiation     Plan ID Energy Fractions Dose per Fraction (cGy) Dose Correction (cGy) Total Dose Delivered (cGy) Elapsed Days   BH L Breast 6X 16 / 16 266 0 4,256 21   L Brst Boost 12E 5 / 5 200 0 1,000 6     Dr Ivan Hurtado        Ovarian cancer [facility])    2005 Initial Diagnosis     Ovarian cancer [facility])      2005 Surgery     MARCO/BSO    Dr Radha Oh      2005 -  Chemotherapy     Dr Radha Oh         Past Medical History:   Diagnosis Date    Basal cell carcinoma     Bulbar polio     Colon polyp     Curvature of spine     Exercise involving walking     "at work alot"    History of anemia as a child     "after polio"    Lymphedema     left leg/"after abdominal lymph nodes removed"/wears thigh high compresion stocking"    Macular degeneration     "and slight glaucoma per eye doctor both eyes"    Muscle weakness     "from polio, predominantly right side"    Osteopenia     Ovarian cancer ([facility] [address] )     radical hysterectomy w/ chemo    Scoliosis     with right hip pain occas    Swallowing difficulty     "at times since polio"    Thyroid nodule     Wears glasses      Past Surgical History:   Procedure Laterality Date    ABDOMINAL HYSTERECTOMY N/A     ovarian cancer    BOWEL RESECTION      BREAST BIOPSY Left 2015    BREAST BIOPSY Right 2014    BREAST BIOPSY Left     [facility]OPSY Left 2019    [facility]    BREAST LUMPECTOMY Left 2019    Procedure: LUMPECTOMY BREAST NEEDLE LOC at 1100;  Surgeon: Machelle Geronimo MD;  [facility];  Service: Surgical Oncology    CATARACT EXTRACTION Right      SECTION      COLONOSCOPY      HYSTERECTOMY      age 56    LYMPH NODE BIOPSY Left 2019    Procedure: SENTINEL NODE BIOPSY- [facility] at 1200;  Surgeon: Machelle Geronimo MD;  [facility];  Service: Surgical Oncology    LYMPHADENECTOMY      MAMMO NEEDLE LOCALIZATION LEFT (ALL INC) Left 2019    SKIN CANCER EXCISION      US GUIDED BREAST BIOPSY LEFT COMPLETE Left 2019       Social History   Social History     Substance and Sexual Activity   Alcohol Use Yes    Alcohol/week: 1 0 standard drinks    Types: 1 Glasses of wine per week    Frequency: Monthly or less    Comment: occasional     Social History     Substance and Sexual Activity   Drug Use No     Social History     Tobacco Use   Smoking Status Former Smoker    Packs/day: 0 50    Years: 45 00    Pack years: 22 50    Last attempt to quit:     Years since quittin 9   Smokeless Tobacco Never Used   Tobacco Comment    Quit         Meds/Allergies     Current Outpatient Medications:     anastrozole (ARIMIDEX) 1 mg tablet, Take 1 tablet (1 mg total) by mouth daily, Disp: 90 tablet, Rfl: 2    aspirin 81 MG tablet, Take by mouth, Disp: , Rfl:     Calcium Citrate-Vitamin D (CALCIUM CITRATE + D) 250-200 MG-UNIT TABS, Take by mouth, Disp: , Rfl:    HM VITAMIN D3 2000 units CAPS, Take by mouth, Disp: , Rfl:     multivitamin (THERAGRAN) TABS, Take 1 tablet by mouth, Disp: , Rfl:     pyridoxine (VITAMIN B6) 100 mg tablet, Take by mouth, Disp: , Rfl:     spironolactone (ALDACTONE) 50 mg tablet, , Disp: , Rfl:     traMADol (ULTRAM) 50 mg tablet, Take 1 tablet (50 mg total) by mouth every 8 (eight) hours as needed for moderate pain (Patient not taking: Reported on 9/3/2019), Disp: 15 tablet, Rfl: 0  Allergies   Allergen Reactions    Metoclopramide Myalgia     Reaction Date: 03Jun2011; Annotation - [id number]: hand cramps and biting of cheeks         Review of Systems     Constitutional: Negative  HENT: Negative  Eyes: Negative  Respiratory: Negative  Cardiovascular: Negative  Gastrointestinal: Negative  Endocrine: Negative  Genitourinary: Negative  Musculoskeletal: Negative  Occasional Hip and ankle pain   Skin: Negative  Allergic/Immunologic: Negative  Neurological: Negative  Hematological: Negative  Psychiatric/Behavioral: Negative  OBJECTIVE:   /80 (BP Location: Right arm, Patient Position: Sitting)   Pulse 89   Temp 98 6 °F (37 °C) (Temporal)   Resp 18   Ht 5' 3" (1 6 m)   Wt 62 6 kg (137 lb 14 4 oz)   BMI 24 43 kg/m²   Pain Assessment:  0  ECOG/Zubrod/WHO: 0 - Asymptomatic    Physical Exam   Constitutional: She appears well-developed  HENT:   Head: Normocephalic  Hair is normal    Mouth/Throat: Oropharynx is clear and moist    Eyes: EOM are normal  No scleral icterus  Neck: Neck supple  No spinous process tenderness present  No edema present  Cardiovascular: Normal rate and regular rhythm  Pulmonary/Chest: Effort normal and breath sounds normal  No respiratory distress  She has no wheezes  She exhibits no tenderness  No breast tenderness  There is no supraclavicular axillary adenopathy palpable the skin in the radiated field is in good condition  No areas of desquamation    Her hematoma in the left breast continues to decrease in size  No significant breast or arm edema   Abdominal: Soft  Bowel sounds are normal  She exhibits no distension, no ascites and no mass  There is no hepatosplenomegaly  There is no tenderness  There is no rebound  Genitourinary: No breast tenderness  Musculoskeletal: Normal range of motion  She exhibits no edema or tenderness  Lymphadenopathy:     She has no cervical adenopathy  She has no axillary adenopathy  Neurological: She is alert  She has normal strength  No cranial nerve deficit  Coordination and gait normal    Skin: Skin is intact  Psychiatric: She has a normal mood and affect  Her speech is normal and behavior is normal    Vitals reviewed  RESULTS    Lab Results: No results found for this or any previous visit (from the past 672 hour(s))  Imaging Studies:No results found  Assessment/Plan:  No orders of the defined types were placed in this encounter  Beatriz Arce is a 66y o  year old female who is 1 month status post adjuvant radiation therapy for left breast carcinoma  Her acute radiation reaction has resolved  We discussed continuation of her breast massage  Her hematoma continues to also decline in size  She remains on anastrozole with good tolerance  She will return for follow-up visit in 6 months      Toni Mas MD  11/26/2019,1:57 PM    Portions of the record may have been created with voice recognition software   Occasional wrong word or "sound a like" substitutions may have occurred due to the inherent limitations of voice recognition software   Read the chart carefully and recognize, using context, where substitutions have occurred

## 2019-12-17 ENCOUNTER — TELEPHONE (OUTPATIENT)
Dept: HEMATOLOGY ONCOLOGY | Facility: CLINIC | Age: 74
End: 2019-12-17

## 2019-12-17 ENCOUNTER — TELEPHONE (OUTPATIENT)
Dept: HEMATOLOGY ONCOLOGY | Facility: MEDICAL CENTER | Age: 74
End: 2019-12-17

## 2019-12-17 NOTE — TELEPHONE ENCOUNTER
Spoke to Nara Charles and instructed him to have his wife have her labs completed prior to her appt on 12/18/19  Nara Charles voiced understanding

## 2019-12-18 ENCOUNTER — OFFICE VISIT (OUTPATIENT)
Dept: HEMATOLOGY ONCOLOGY | Facility: CLINIC | Age: 74
End: 2019-12-18
Payer: COMMERCIAL

## 2019-12-18 VITALS
OXYGEN SATURATION: 97 % | TEMPERATURE: 98 F | RESPIRATION RATE: 18 BRPM | WEIGHT: 138.2 LBS | BODY MASS INDEX: 24.49 KG/M2 | DIASTOLIC BLOOD PRESSURE: 86 MMHG | HEART RATE: 86 BPM | HEIGHT: 63 IN | SYSTOLIC BLOOD PRESSURE: 136 MMHG

## 2019-12-18 DIAGNOSIS — C50.412 MALIGNANT NEOPLASM OF UPPER-OUTER QUADRANT OF LEFT BREAST IN FEMALE, ESTROGEN RECEPTOR POSITIVE (HCC): Primary | ICD-10-CM

## 2019-12-18 DIAGNOSIS — Z17.0 MALIGNANT NEOPLASM OF UPPER-OUTER QUADRANT OF LEFT BREAST IN FEMALE, ESTROGEN RECEPTOR POSITIVE (HCC): Primary | ICD-10-CM

## 2019-12-18 PROBLEM — M85.852 OSTEOPENIA OF LEFT HIP: Status: ACTIVE | Noted: 2019-12-18

## 2019-12-18 PROCEDURE — 99215 OFFICE O/P EST HI 40 MIN: CPT | Performed by: PHYSICIAN ASSISTANT

## 2019-12-18 RX ORDER — ANASTROZOLE 1 MG/1
1 TABLET ORAL DAILY
Qty: 90 TABLET | Refills: 3 | Status: SHIPPED | OUTPATIENT
Start: 2019-12-18 | End: 2020-12-08 | Stop reason: SDUPTHER

## 2019-12-18 NOTE — PROGRESS NOTES
Hematology/Oncology Outpatient Follow-up  Pavel Osorio 66 y o  female 1945 [id number]    Date:  12/18/2019      Assessment and Plan:    1  Malignant neoplasm of upper-outer quadrant of left breast in female, estrogen receptor positive [facility])  75-year-old female presents for follow-up regarding history of stage IIB left-sided breast cancer status post lumpectomy  She is now on adjuvant therapy with Arimidex  She is tolerating well  She does have some joint pains and increased hotness of her hot flashes but overall can tolerate side effects  These side effects are intermittent  Follow-up in 6 months with repeat labs  Labs also to be completed prior to her 1st Prolia injection within the next 2 weeks  - CBC and differential; Standing  - Comprehensive metabolic panel; Standing  - Cancer antigen 27 29; Standing  - CBC and differential  - Comprehensive metabolic panel  - Cancer antigen 27 29  - anastrozole (ARIMIDEX) 1 mg tablet; Take 1 tablet (1 mg total) by mouth daily  Dispense: 90 tablet; Refill: 3    2  Osteopenia   Patient had DEXA scan on 09/30/2019  This showed osteopenia  Noted that since 2016 there is statistically significant decrease in bone mineral density of the left hip  The 10 risk for hip fracture is 3 5%  Recommendations is that if the risk fracture of hip is greater than 3%, treatment is recommended  Patient is at 3 5%  She is a candidate for treatment with Prolia  Reviewed side effect include fatigue, hypocalcemia, muscle aching, back pain, diarrhea, cough, headache  Rare but serious side effect is osteonecrosis of the jaw  She is aware that she needs to disclose information to her dentist prior to any procedures  She states she has good dental health besides receding gumline  She is up-to-date with dental care  We also discussed that she could receive oral therapy with Fosamax from her PCP  Also discussed she could discuss Prolia with her PCP    She would like to proceed with Payam  Written information was provided regarding Prolia  She takes calcium 600 milligrams p o  Daily  Advised that she could take up to 1200  She is taking 3000 International Units of vitamin-D  This was last assessed in May and vitamin-D was sufficient  Continue the same  Repeat Dexa to be completed in 2 years  HPI:       Malignant neoplasm of upper-outer quadrant of left breast in female, estrogen receptor positive ([facility] [address] )    5/30/2019 Initial Diagnosis     Breast cancer ([facility] [address] )      5/30/2019 Biopsy     Left breast, ultrasound-guided biopsy, 200 8cmfn 4 passes 12g Marquee:  - Invasive mammary carcinoma of no special type (ductal, not otherwise specified)    - grade 2  - %  - %  - HER2 by FISH negative      6/2019 Genetic Testing     BRCA negative      7/8/2019 -  Cancer Staged     Staging form: Breast, AJCC 8th Edition  - Clinical: Stage IB (cT2, cN0, cM0, G2, ER+, PA+, HER2-) - Signed by Abelardo Norton MD on 7/8/2019  Laterality: Left  Method of lymph node assessment: Clinical  Histologic grading system: 3 grade system        7/26/2019 Surgery     Left breast lumpectomy with SLN biopsy:  - Invasive mammary carcinoma, 2 7 cm, grade 2  - approximately 80% of tumor is DCIS  - LN's - one lymph node with metastatic carcinoma, 2 1mm deposit, no extranodal extention  - + LVI  - margins negative      8/12/2019 -  Cancer Staged     Staging form: Breast, AJCC 8th Edition  - Pathologic: Stage IB (pT2, pN1(sn), cM0, G2, ER+, PA+, HER2-) - Signed by Abelardo Norton MD on 8/12/2019  Neoadjuvant therapy: No  Laterality: Left  Method of lymph node assessment: [place] lymph node biopsy  Histologic grading system: 3 grade system        9/25/2019 - 10/23/2019 Radiation     Plan ID Energy Fractions Dose per Fraction (cGy) Dose Correction (cGy) Total Dose Delivered (cGy) Elapsed Days   BH L Breast 6X 16 / 16 266 0 4,256 21   L Brst Boost 12E 5 / 5 200 0 1,000 6     Dr Ada Flannery        Ovarian cancer [facility]) 2005 Initial Diagnosis     Ovarian cancer [facility])      2005 Surgery     MARCO/BSO    Dr Kiah Bedoya      2005 -  Chemotherapy     Dr Kiah Bedoya       49-year-old female (ECOG 0) with history of invasive mammary carcinoma who is status post lumpectomy and sentinel lymph node sampling  Patient's tumor measured 2 7 cm, grade 2 of 3  Approximately 80% of tumor is DCIS     3 sentinel lymph nodes were removed  1 had focus of metastatic carcinoma measuring 2 1 mm; however negative for extranodal extension      Patient's anatomic stage is at least stage IIB, T2, N1 a, C M0, G2   %, %, her 2-by FISH     She had postoperative complications with hematoma formation for which she has been following Surgical Oncology  She states that she continues to apply warm compresses to the area and it is improving  She has follow-up with Surgical Oncology for routine visit in November 2019  Following surgery patient was noted to have very slight invasion into 1 lymph node, stage IIB  She was offered chemotherapy  He was to proceed with aromatase inhibitor alone  She has been on anastrozole 1 mg  She was given 1 month's supply  She then called in to the office and said she was doing well and more refills were sent in  Interval history:    ROS: Review of Systems   Constitutional: Positive for fatigue (mild)  Negative for activity change, appetite change, chills, fever and unexpected weight change  Respiratory: Negative for cough and shortness of breath  Cardiovascular: Negative for chest pain, palpitations and leg swelling  Gastrointestinal: Negative for abdominal pain, constipation, diarrhea, nausea and vomiting  Endocrine:        Hot flashes, more hot since starting Arimidex    Genitourinary: Negative for difficulty urinating and dysuria  Musculoskeletal: Positive for arthralgias  Skin: Negative  Neurological: Negative for dizziness, weakness, light-headedness, numbness and headaches  Hematological: Negative  Psychiatric/Behavioral: Negative          Past Medical History:   Diagnosis Date    Basal cell carcinoma     Bulbar polio     Colon polyp     Curvature of spine     Exercise involving walking     "at work alot"    History of anemia as a child     "after polio"    Lymphedema     left leg/"after abdominal lymph nodes removed"/wears thigh high compresion stocking"    Macular degeneration     "and slight glaucoma per eye doctor both eyes"    Muscle weakness     "from polio, predominantly right side"    Osteopenia     Ovarian cancer ([address] )     radical hysterectomy w/ chemo    Scoliosis     with right hip pain occas    Swallowing difficulty     "at times since polio"    Thyroid nodule     Wears glasses        Past Surgical History:   Procedure Laterality Date    ABDOMINAL HYSTERECTOMY N/A     ovarian cancer    BOWEL RESECTION      BREAST BIOPSY Left 2015    BREAST BIOPSY Right 2014    BREAST BIOPSY Left     BREAST BIOPSY Left 2019    [facility]    BREAST LUMPECTOMY Left 2019    Procedure: LUMPECTOMY BREAST NEEDLE LOC at 1100;  Surgeon: Mary Beth Garcia MD;  Location: [facility];  Service: Surgical Oncology    CATARACT EXTRACTION Right 2008     SECTION      COLONOSCOPY      HYSTERECTOMY      age 56   Celina Frankel LYMPH NODE BIOPSY Left 2019    Procedure: SENTINEL NODE BIOPSY- NUC MED INJ at 1200;  Surgeon: Mary Beth Garcia MD;  Location: [facility];  Service: Surgical Oncology    LYMPHADENECTOMY      MAMMO NEEDLE LOCALIZATION LEFT (ALL INC) Left 2019    SKIN CANCER EXCISION      US GUIDED BREAST BIOPSY LEFT COMPLETE Left 2019       Social History     Socioeconomic History    Marital status: /Civil Union     Spouse name: None    Number of children: None    Years of education: None    Highest education level: None   Occupational History    None   Social Needs    Financial resource strain: None    Food insecurity:     Worry: None     Inability: None    Transportation needs:     Medical: None     Non-medical: None   Tobacco Use    Smoking status: Former Smoker     Packs/day: 0 50     Years: 45 00     Pack years: 22 50     Last attempt to quit: 2005     Years since quittin 9    Smokeless tobacco: Never Used    Tobacco comment: Quit   Substance and Sexual Activity    Alcohol use: Yes     Alcohol/week: 1 0 standard drinks     Types: 1 Glasses of wine per week     Frequency: Monthly or less     Comment: occasional    Drug use: No    Sexual activity: Yes     Partners: Male     Birth control/protection: Post-menopausal, Surgical     Comment:    Lifestyle    Physical activity:     Days per week: None     Minutes per session: None    Stress: None   Relationships    Social connections:     Talks on phone: None     Gets together: None     Attends Caodaism service: None     Active member of club or organization: None     Attends meetings of clubs or organizations: None     Relationship status: None    Intimate partner violence:     Fear of current or ex partner: None     Emotionally abused: None     Physically abused: None     Forced sexual activity: None   Other Topics Concern    None   Social History Narrative    None       Family History   Problem Relation Age of Onset    Heart failure Mother     Breast cancer Mother 78    Hypertension Father     Stroke Father     No Known Problems Brother     No Known Problems Brother     No Known Problems Daughter     No Known Problems Maternal Grandmother     Cancer Maternal Grandfather 61        chest and neck    No Known Problems Paternal Grandmother     Cancer Paternal Grandfather         unknown type and age   Baylee Sang No Known Problems Daughter     Stomach cancer Maternal Aunt 46    Colon cancer Maternal Uncle 71    Lung cancer Maternal Aunt 50       Allergies   Allergen Reactions    Metoclopramide Myalgia     Reaction Date: 2011;  Annotation - [id number]: hand cramps and biting of cheeks         Current Outpatient Medications:     anastrozole (ARIMIDEX) 1 mg tablet, Take 1 tablet (1 mg total) by mouth daily, Disp: 90 tablet, Rfl: 3    aspirin 81 MG tablet, Take by mouth, Disp: , Rfl:     Calcium Citrate-Vitamin D (CALCIUM CITRATE + D) 250-200 MG-UNIT TABS, Take by mouth, Disp: , Rfl:     HM VITAMIN D3 2000 units CAPS, Take by mouth, Disp: , Rfl:     multivitamin (THERAGRAN) TABS, Take 1 tablet by mouth, Disp: , Rfl:     pyridoxine (VITAMIN B6) 100 mg tablet, Take by mouth, Disp: , Rfl:     spironolactone (ALDACTONE) 50 mg tablet, , Disp: , Rfl:     traMADol (ULTRAM) 50 mg tablet, Take 1 tablet (50 mg total) by mouth every 8 (eight) hours as needed for moderate pain (Patient not taking: Reported on 9/3/2019), Disp: 15 tablet, Rfl: 0      Physical Exam:  /86 (BP Location: Right arm, Patient Position: Sitting, Cuff Size: Adult)   Pulse 86   Temp 98 °F (36 7 °C)   Resp 18   Ht 5' 3" (1 6 m)   Wt 62 7 kg (138 lb 3 2 oz)   SpO2 97%   BMI 24 48 kg/m²     Physical Exam   Constitutional: She is oriented to person, place, and time  She appears well-developed and well-nourished  No distress  HENT:   Head: Normocephalic and atraumatic  Eyes: Conjunctivae are normal  No scleral icterus  Neck: Normal range of motion  Neck supple  Cardiovascular: Normal rate, regular rhythm and normal heart sounds  No murmur heard  Pulmonary/Chest: Effort normal and breath sounds normal  No respiratory distress  Abdominal: Soft  There is no tenderness  Musculoskeletal: Normal range of motion  She exhibits no edema or tenderness  Lymphadenopathy:     She has no cervical adenopathy  Neurological: She is alert and oriented to person, place, and time  No cranial nerve deficit  Skin: Skin is warm and dry  Psychiatric: She has a normal mood and affect       Labs:  Lab Results   Component Value Date    WBC 6 50 10/07/2019    HGB 13 4 10/07/2019    HCT 40 8 10/07/2019    MCV 97 10/07/2019     10/07/2019     Lab Results   Component Value Date    K 5 3 07/22/2019     07/22/2019    CO2 33 (H) 07/22/2019    BUN 27 (H) 07/22/2019    CREATININE 0 83 07/22/2019    CALCIUM 10 0 07/22/2019    AST 34 07/22/2019    ALT 39 07/22/2019    ALKPHOS 66 07/22/2019    EGFR 69 07/22/2019     Patient voiced understanding and agreement in the above discussion  Aware to contact our office with questions/symptoms in the interim  This note has been generated by voice recognition software system  Therefore, there may be spelling, grammar, and or syntax errors  Please contact if questions arise  I have spent 40 minutes with Patient  today in which greater than 50% of this time was spent in counseling/coordination of care regarding Diagnostic results, Prognosis, Risks and benefits of tx options, Intructions for management and Impressions

## 2020-01-07 NOTE — PROGRESS NOTES
Cancelled patient's Prolia injection for 1/8 due to insurance approval still pending per Daniele King contacted the patient to review the need to reschedule once approved  Placed order on HOLD until Prolia approved

## 2020-01-08 ENCOUNTER — TELEPHONE (OUTPATIENT)
Dept: HEMATOLOGY ONCOLOGY | Facility: CLINIC | Age: 75
End: 2020-01-08

## 2020-01-08 ENCOUNTER — HOSPITAL ENCOUNTER (OUTPATIENT)
Dept: INFUSION CENTER | Facility: CLINIC | Age: 75
End: 2020-01-08

## 2020-01-08 NOTE — TELEPHONE ENCOUNTER
Called patient and left voicemail reviewing her Prolia is now approved and she was rescheduled for Friday January 10th at 1pm (scheduled this with Marletta Card)  If this appt date and time does not work for her encouraged her to call [facility] to reschedule

## 2020-01-09 ENCOUNTER — TELEPHONE (OUTPATIENT)
Dept: INFUSION CENTER | Facility: CLINIC | Age: 75
End: 2020-01-09

## 2020-01-09 NOTE — TELEPHONE ENCOUNTER
Pt called to change her infusion appt that is scheduled for tomorrow 1/10 at 1pm  Rescheduled to Monday 1/13 at 1030am

## 2020-01-10 ENCOUNTER — HOSPITAL ENCOUNTER (OUTPATIENT)
Dept: INFUSION CENTER | Facility: CLINIC | Age: 75
End: 2020-01-10

## 2020-01-13 ENCOUNTER — HOSPITAL ENCOUNTER (OUTPATIENT)
Dept: INFUSION CENTER | Facility: CLINIC | Age: 75
Discharge: HOME | End: 2020-01-13
Payer: COMMERCIAL

## 2020-01-13 VITALS
BODY MASS INDEX: 22.55 KG/M2 | DIASTOLIC BLOOD PRESSURE: 67 MMHG | HEIGHT: 65 IN | TEMPERATURE: 97.4 F | SYSTOLIC BLOOD PRESSURE: 150 MMHG | HEART RATE: 81 BPM | WEIGHT: 135.36 LBS | RESPIRATION RATE: 16 BRPM

## 2020-01-13 DIAGNOSIS — C50.412 MALIGNANT NEOPLASM OF UPPER-OUTER QUADRANT OF LEFT BREAST IN FEMALE, ESTROGEN RECEPTOR POSITIVE (HCC): Primary | ICD-10-CM

## 2020-01-13 DIAGNOSIS — M85.852 OSTEOPENIA OF LEFT HIP: ICD-10-CM

## 2020-01-13 DIAGNOSIS — Z17.0 MALIGNANT NEOPLASM OF UPPER-OUTER QUADRANT OF LEFT BREAST IN FEMALE, ESTROGEN RECEPTOR POSITIVE (HCC): Primary | ICD-10-CM

## 2020-01-13 PROCEDURE — 96401 CHEMO ANTI-NEOPL SQ/IM: CPT

## 2020-01-13 RX ADMIN — DENOSUMAB 60 MG: 60 INJECTION SUBCUTANEOUS at 11:09

## 2020-01-13 NOTE — PROGRESS NOTES
Patient arrived on unit for Prolia  Denies any dental issues/pain  Tolerated injection in RA  Aware of next appointment   AVS given to patient

## 2020-02-17 NOTE — TELEPHONE ENCOUNTER
Patient called in stated that she needed a copy of her labs as patient doesn't go to a [facility] lab   Please call patient back at [phone]
SHARON informing patient that she would have to come into one of our offices to have the lab script printed so she can take it to the lab or her choice  I instructed patient that we are at the [facility] location today but she can go to any location to have the lab script printed  If patient comes into office please print the lab scripts for the labs that were ordered for the patient her last visit in office  If you need assistance doing this please reach out to me and I will walk you through the process 
room air

## 2020-06-02 ENCOUNTER — RADIATION ONCOLOGY FOLLOW-UP (OUTPATIENT)
Dept: RADIATION ONCOLOGY | Facility: CLINIC | Age: 75
End: 2020-06-02
Attending: RADIOLOGY
Payer: COMMERCIAL

## 2020-06-02 DIAGNOSIS — Z17.0 MALIGNANT NEOPLASM OF UPPER-OUTER QUADRANT OF LEFT BREAST IN FEMALE, ESTROGEN RECEPTOR POSITIVE (HCC): Primary | ICD-10-CM

## 2020-06-02 DIAGNOSIS — C50.412 MALIGNANT NEOPLASM OF UPPER-OUTER QUADRANT OF LEFT BREAST IN FEMALE, ESTROGEN RECEPTOR POSITIVE (HCC): Primary | ICD-10-CM

## 2020-06-02 PROCEDURE — 99211 OFF/OP EST MAY X REQ PHY/QHP: CPT | Performed by: RADIOLOGY

## 2020-06-02 RX ORDER — FUROSEMIDE 20 MG/1
20 TABLET ORAL DAILY
COMMUNITY
End: 2020-12-08 | Stop reason: ALTCHOICE

## 2020-06-11 ENCOUNTER — HOSPITAL ENCOUNTER (OUTPATIENT)
Dept: MAMMOGRAPHY | Facility: CLINIC | Age: 75
Discharge: HOME | End: 2020-06-11
Payer: COMMERCIAL

## 2020-06-11 VITALS — HEIGHT: 65 IN | BODY MASS INDEX: 21.66 KG/M2 | WEIGHT: 130 LBS

## 2020-06-11 DIAGNOSIS — C50.412 MALIGNANT NEOPLASM OF UPPER-OUTER QUADRANT OF LEFT BREAST IN FEMALE, ESTROGEN RECEPTOR POSITIVE (HCC): ICD-10-CM

## 2020-06-11 DIAGNOSIS — Z17.0 MALIGNANT NEOPLASM OF UPPER-OUTER QUADRANT OF LEFT BREAST IN FEMALE, ESTROGEN RECEPTOR POSITIVE (HCC): ICD-10-CM

## 2020-06-11 PROCEDURE — 77066 DX MAMMO INCL CAD BI: CPT

## 2020-06-11 PROCEDURE — G0279 TOMOSYNTHESIS, MAMMO: HCPCS

## 2020-06-18 ENCOUNTER — ANNUAL EXAM (OUTPATIENT)
Dept: OBGYN CLINIC | Facility: CLINIC | Age: 75
End: 2020-06-18
Payer: COMMERCIAL

## 2020-06-18 VITALS
BODY MASS INDEX: 22.98 KG/M2 | DIASTOLIC BLOOD PRESSURE: 80 MMHG | TEMPERATURE: 97.9 F | SYSTOLIC BLOOD PRESSURE: 136 MMHG | HEIGHT: 64 IN | WEIGHT: 134.6 LBS

## 2020-06-18 DIAGNOSIS — Z01.419 ENCOUNTER FOR GYNECOLOGICAL EXAMINATION WITHOUT ABNORMAL FINDING: Primary | ICD-10-CM

## 2020-06-18 DIAGNOSIS — Z01.419 ENCOUNTER FOR GYNECOLOGICAL EXAMINATION (GENERAL) (ROUTINE) WITHOUT ABNORMAL FINDINGS: ICD-10-CM

## 2020-06-18 DIAGNOSIS — Z11.51 SCREENING FOR HPV (HUMAN PAPILLOMAVIRUS): ICD-10-CM

## 2020-06-18 PROCEDURE — G0145 SCR C/V CYTO,THINLAYER,RESCR: HCPCS | Performed by: NURSE PRACTITIONER

## 2020-06-18 PROCEDURE — S0612 ANNUAL GYNECOLOGICAL EXAMINA: HCPCS | Performed by: NURSE PRACTITIONER

## 2020-06-18 PROCEDURE — 87624 HPV HI-RISK TYP POOLED RSLT: CPT | Performed by: NURSE PRACTITIONER

## 2020-06-20 LAB
HPV HR 12 DNA CVX QL NAA+PROBE: NEGATIVE
HPV16 DNA CVX QL NAA+PROBE: NEGATIVE
HPV18 DNA CVX QL NAA+PROBE: NEGATIVE

## 2020-06-22 ENCOUNTER — OFFICE VISIT (OUTPATIENT)
Dept: SURGICAL ONCOLOGY | Facility: CLINIC | Age: 75
End: 2020-06-22
Payer: COMMERCIAL

## 2020-06-22 VITALS
WEIGHT: 133.8 LBS | BODY MASS INDEX: 22.84 KG/M2 | HEIGHT: 64 IN | RESPIRATION RATE: 18 BRPM | SYSTOLIC BLOOD PRESSURE: 124 MMHG | HEART RATE: 81 BPM | TEMPERATURE: 98.2 F | DIASTOLIC BLOOD PRESSURE: 70 MMHG

## 2020-06-22 DIAGNOSIS — Z79.811 USE OF ANASTROZOLE: ICD-10-CM

## 2020-06-22 DIAGNOSIS — C50.412 MALIGNANT NEOPLASM OF UPPER-OUTER QUADRANT OF LEFT BREAST IN FEMALE, ESTROGEN RECEPTOR POSITIVE (HCC): Primary | ICD-10-CM

## 2020-06-22 DIAGNOSIS — R92.2 DENSE BREAST TISSUE: ICD-10-CM

## 2020-06-22 DIAGNOSIS — Z17.0 MALIGNANT NEOPLASM OF UPPER-OUTER QUADRANT OF LEFT BREAST IN FEMALE, ESTROGEN RECEPTOR POSITIVE (HCC): Primary | ICD-10-CM

## 2020-06-22 PROBLEM — R92.30 DENSE BREAST TISSUE: Status: ACTIVE | Noted: 2020-06-22

## 2020-06-22 PROCEDURE — 99214 OFFICE O/P EST MOD 30 MIN: CPT | Performed by: SURGERY

## 2020-06-25 LAB
LAB AP GYN PRIMARY INTERPRETATION: NORMAL
Lab: NORMAL

## 2020-06-29 ENCOUNTER — TELEPHONE (OUTPATIENT)
Dept: OBGYN CLINIC | Facility: MEDICAL CENTER | Age: 75
End: 2020-06-29

## 2020-07-08 ENCOUNTER — OFFICE VISIT (OUTPATIENT)
Dept: HEMATOLOGY ONCOLOGY | Facility: CLINIC | Age: 75
End: 2020-07-08
Payer: COMMERCIAL

## 2020-07-08 VITALS
DIASTOLIC BLOOD PRESSURE: 64 MMHG | OXYGEN SATURATION: 93 % | RESPIRATION RATE: 16 BRPM | WEIGHT: 132 LBS | BODY MASS INDEX: 22.53 KG/M2 | HEART RATE: 81 BPM | TEMPERATURE: 96.9 F | SYSTOLIC BLOOD PRESSURE: 112 MMHG | HEIGHT: 64 IN

## 2020-07-08 DIAGNOSIS — Z17.0 MALIGNANT NEOPLASM OF UPPER-OUTER QUADRANT OF LEFT BREAST IN FEMALE, ESTROGEN RECEPTOR POSITIVE (HCC): Primary | ICD-10-CM

## 2020-07-08 DIAGNOSIS — C50.412 MALIGNANT NEOPLASM OF UPPER-OUTER QUADRANT OF LEFT BREAST IN FEMALE, ESTROGEN RECEPTOR POSITIVE (HCC): Primary | ICD-10-CM

## 2020-07-08 PROCEDURE — 99214 OFFICE O/P EST MOD 30 MIN: CPT | Performed by: PHYSICIAN ASSISTANT

## 2020-07-08 NOTE — PROGRESS NOTES
Hematology/Oncology Outpatient Follow-up  Parvin Rincon 66 y o  female 1945 [id number]    Date:  7/8/2020      Assessment and Plan:  1  Malignant neoplasm of upper-outer quadrant of left breast in female, estrogen receptor positive [facility])  47-year-old female presents for follow-up regarding history of left-sided breast cancer as below  She continues on adjuvant aromatase inhibitor, Arimidex  She tolerates this well  She has refills of the end of the year  He is taking calcium and vitamin-D  She had 1st Prolia injections 6 months ago tolerated this well  She did not have CMP prior to today's visit  This is requested prior to Prolia next week  Reviewed that this is listed as fasting that is not need to be fasting unless she wants know her fasting sugar  Moving forward she will need CBC and a CMP, tumor marker every 6 months  She is aware of this  Mammogram is up-to-date  This is negative for any recurrent disease  Patient is feeling well  Follow-up 6 months  - Comprehensive metabolic panel; Future  - CBC and differential; Future  - Comprehensive metabolic panel; Future  - Cancer antigen 27 29; Future        HPI:     Malignant neoplasm of upper-outer quadrant of left breast in female, estrogen receptor positive ([facility] [address] )    5/30/2019 Initial Diagnosis     Breast cancer ([facility] [address] )      5/30/2019 Biopsy     Left breast, ultrasound-guided biopsy, 200 8cmfn 4 passes 12g Marquee:  - Invasive mammary carcinoma of no special type (ductal, not otherwise specified)    - grade 2  - %  - %  - HER2 by FISH negative      6/2019 Genetic Testing     BRCA negative      7/8/2019 -  Cancer Staged     Staging form: Breast, AJCC 8th Edition  - Clinical: Stage IB (cT2, cN0, cM0, G2, ER+, OH+, HER2-) - Signed by Franck Leon MD on 7/8/2019  Laterality: Left  Method of lymph node assessment: Clinical  Histologic grading system: 3 grade system        7/26/2019 Surgery     Left breast lumpectomy with SLN biopsy:  - Invasive mammary carcinoma, 2 7 cm, grade 2  - approximately 80% of tumor is DCIS  - LN's - one lymph node with metastatic carcinoma, 2 1mm deposit, no extranodal extention  - + LVI  - margins negative      8/12/2019 -  Cancer Staged     Staging form: Breast, AJCC 8th Edition  - Pathologic: Stage IB (pT2, pN1(sn), cM0, G2, ER+, GA+, HER2-) - Signed by Ralph Grijalva MD on 8/12/2019  Neoadjuvant therapy: No  Laterality: Left  Method of lymph node assessment: [place] lymph node biopsy  Histologic grading system: 3 grade system        9/25/2019 - 10/23/2019 Radiation     Plan ID Energy Fractions Dose per Fraction (cGy) Dose Correction (cGy) Total Dose Delivered (cGy) Elapsed Days   BH L Breast 6X 16 / 16 266 0 4,256 21   L Brst Boost 12E 5 / 5 200 0 1,000 6     Dr Ruby Johnson        Ovarian cancer [facility])    2005 Initial Diagnosis     Ovarian cancer [facility])      2005 Surgery     MARCO/BSO    Dr Josefa Sarah      2005 -  Chemotherapy     Dr Josefa Sarah       39-year-old female (ECOG 0) with history of invasive mammary carcinoma who is status post lumpectomy and sentinel lymph node sampling  Patient's tumor measured 2 7 cm, grade 2 of 3   Approximately 80% of tumor is DCIS     3 sentinel lymph nodes were removed   1 had focus of metastatic carcinoma measuring 2 1 mm; however negative for extranodal extension      Patient's anatomic stage is at least stage IIB, T2, N1 a, C M0, G2   %, %, her 2-by FISH     She had postoperative complications with hematoma formation for which she has been following Surgical Oncology  Freida Alexandre states that she continues to apply warm compresses to the area and it is improving   She has follow-up with Surgical Oncology for routine visit in November 2019       Following surgery patient was noted to have very slight invasion into 1 lymph node, stage IIB  She was offered chemotherapy  He was to proceed with aromatase inhibitor alone      She is on anastrozole 1 mg       Interval history: hot flashes are worsen when she is doing a lot of activity, also bothered with the humidity     ROS: Review of Systems   Constitutional: Negative for appetite change, chills, fatigue, fever and unexpected weight change  Respiratory: Negative for cough and shortness of breath  Cardiovascular: Negative for chest pain, palpitations and leg swelling  Gastrointestinal: Negative for abdominal pain, blood in stool, constipation, diarrhea, nausea and vomiting  Genitourinary: Negative for difficulty urinating, dysuria and hematuria  Musculoskeletal: Positive for arthralgias (age related, not worse with AI)  Skin: Negative  Neurological: Negative for dizziness, weakness, light-headedness, numbness and headaches  Hematological: Negative  Psychiatric/Behavioral: Negative          Past Medical History:   Diagnosis Date    Basal cell carcinoma     BRCA1 negative     BRCA2 negative     Bulbar polio     Colon polyp     Curvature of spine     Exercise involving walking     "at work alot"    History of anemia as a child     "after polio"    History of radiation therapy 2019    left breast    Lymphedema     left leg/"after abdominal lymph nodes removed"/wears thigh high compresion stocking"    Macular degeneration     "and slight glaucoma per eye doctor both eyes"    Muscle weakness     "from polio, predominantly right side"    Osteopenia     Osteoporosis     Ovarian cancer ([facility] [address] ) 2005    radical hysterectomy w/ chemo    Scoliosis     with right hip pain occas    Swallowing difficulty     "at times since polio"    Thyroid nodule     Wears glasses        Past Surgical History:   Procedure Laterality Date    ABDOMINAL HYSTERECTOMY N/A     ovarian cancer    BOWEL RESECTION      BREAST BIOPSY Left 01/12/2015    BREAST BIOPSY Right 06/11/2014    BREAST BIOPSY Left 2007    BREAST BIOPSY Left 05/30/2019    [facility]    BREAST LUMPECTOMY Left 7/26/2019    Procedure: LUMPECTOMY BREAST NEEDLE LOC at 1100; Surgeon: Lizz Mcleod MD;  [facility]: [facility];  Service: Surgical Oncology    CATARACT EXTRACTION Right 2008     SECTION      COLONOSCOPY      HYSTERECTOMY      age 56    LYMPH NODE BIOPSY Left 2019    Procedure: SENTINEL NODE BIOPSY- NUC MED INJ at 1200;  Surgeon: Lizz Mcleod MD;  [facility]: [facility];  Service: Surgical Oncology    LYMPHADENECTOMY      MAMMO NEEDLE LOCALIZATION LEFT (ALL INC) Left 2019    SKIN CANCER EXCISION      US GUIDED BREAST BIOPSY LEFT COMPLETE Left 2019       Social History     Socioeconomic History    Marital status: /Civil Union     Spouse name: Not on file    Number of children: Not on file    Years of education: Not on file    Highest education level: Not on file   Occupational History    Not on file   Social Needs    Financial resource strain: Not on file    Food insecurity:     Worry: Not on file     Inability: Not on file    Transportation needs:     Medical: Not on file     Non-medical: Not on file   Tobacco Use    Smoking status: Former Smoker     Packs/day: 0 50     Years: 45 00     Pack years: 22 50     Last attempt to quit: 2005     Years since quitting: 15 5    Smokeless tobacco: Never Used    Tobacco comment: Quit   Substance and Sexual Activity    Alcohol use:  Yes     Alcohol/week: 1 0 standard drinks     Types: 1 Glasses of wine per week     Frequency: Monthly or less     Comment: occasional    Drug use: No    Sexual activity: Not Currently     Partners: Male     Birth control/protection: Post-menopausal, Surgical     Comment:    Lifestyle    Physical activity:     Days per week: Not on file     Minutes per session: Not on file    Stress: Not on file   Relationships    Social connections:     Talks on phone: Not on file     Gets together: Not on file     Attends [employer] service: Not on file     Active member of club or organization: Not on file     Attends meetings of clubs or organizations: Not on file Relationship status: Not on file    Intimate partner violence:     Fear of current or ex partner: Not on file     Emotionally abused: Not on file     Physically abused: Not on file     Forced sexual activity: Not on file   Other Topics Concern    Not on file   Social History Narrative    Not on file       Family History   Problem Relation Age of Onset    Heart failure Mother     Breast cancer Mother 78    Hypertension Father     Stroke Father     No Known Problems Brother     No Known Problems Brother     No Known Problems Daughter     No Known Problems Maternal Grandmother     Cancer Maternal Grandfather 61        chest and neck    No Known Problems Paternal Grandmother     Cancer Paternal Grandfather         unknown type and age   Magan Pert No Known Problems Daughter     Stomach cancer Maternal Aunt 46    Colon cancer Maternal Uncle 71    Lung cancer Maternal Aunt 48    No Known Problems Paternal Aunt     No Known Problems Paternal Aunt     No Known Problems Paternal Aunt        Allergies   Allergen Reactions    Metoclopramide Myalgia     Reaction Date: 03Jun2011;  Annotation - [id number]: hand cramps and biting of cheeks         Current Outpatient Medications:     anastrozole (ARIMIDEX) 1 mg tablet, Take 1 tablet (1 mg total) by mouth daily, Disp: 90 tablet, Rfl: 3    aspirin 81 MG tablet, Take by mouth, Disp: , Rfl:     Calcium Citrate-Vitamin D (CALCIUM CITRATE + D) 250-200 MG-UNIT TABS, Take by mouth, Disp: , Rfl:     Denosumab (PROLIA SC), Inject under the skin every 6 (six) months, Disp: , Rfl:     furosemide (LASIX) 20 mg tablet, Take 20 mg by mouth daily, Disp: , Rfl:     HM VITAMIN D3 2000 units CAPS, Take by mouth, Disp: , Rfl:     multivitamin (THERAGRAN) TABS, Take 1 tablet by mouth, Disp: , Rfl:     pyridoxine (VITAMIN B6) 100 mg tablet, Take by mouth, Disp: , Rfl:       Physical Exam:  /64 (BP Location: Left arm, Patient Position: Sitting, Cuff Size: Adult)   Pulse 81 Temp (!) 96 9 °F (36 1 °C) (Tympanic)   Resp 16   Ht 5' 4" (1 626 m)   Wt 59 9 kg (132 lb)   SpO2 93%   BMI 22 66 kg/m²     Physical Exam   Constitutional: She is oriented to person, place, and time  She appears well-developed and well-nourished  No distress  HENT:   Head: Normocephalic and atraumatic  Eyes: Conjunctivae are normal  No scleral icterus  Neck: Normal range of motion  Neck supple  Cardiovascular: Normal rate, regular rhythm and normal heart sounds  No murmur heard  Pulmonary/Chest: Effort normal and breath sounds normal  No respiratory distress  Abdominal: Soft  There is no tenderness  Musculoskeletal: Normal range of motion  She exhibits no edema or tenderness  Chronic lymphedema of the LLE, wearing compression stocking   Lymphadenopathy:     She has no cervical adenopathy  She has no axillary adenopathy  Right: No supraclavicular adenopathy present  Left: No supraclavicular adenopathy present  Neurological: She is alert and oriented to person, place, and time  No cranial nerve deficit  Skin: Skin is warm and dry  Psychiatric: She has a normal mood and affect  Vitals reviewed  Labs:  Lab Results   Component Value Date    WBC 6 50 10/07/2019    HGB 13 4 10/07/2019    HCT 40 8 10/07/2019    MCV 97 10/07/2019     10/07/2019     Lab Results   Component Value Date    K 5 3 07/22/2019     07/22/2019    CO2 33 (H) 07/22/2019    BUN 27 (H) 07/22/2019    CREATININE 0 83 07/22/2019    CALCIUM 10 0 07/22/2019    AST 34 07/22/2019    ALT 39 07/22/2019    ALKPHOS 66 07/22/2019    EGFR 69 07/22/2019     Patient voiced understanding and agreement in the above discussion  Aware to contact our office with questions/symptoms in the interim  This note has been generated by voice recognition software system  Therefore, there may be spelling, grammar, and or syntax errors  Please contact if questions arise

## 2020-07-13 ENCOUNTER — HOSPITAL ENCOUNTER (OUTPATIENT)
Dept: INFUSION CENTER | Facility: CLINIC | Age: 75
Discharge: HOME | End: 2020-07-13
Payer: COMMERCIAL

## 2020-07-13 ENCOUNTER — APPOINTMENT (OUTPATIENT)
Dept: LAB | Facility: CLINIC | Age: 75
End: 2020-07-13
Payer: COMMERCIAL

## 2020-07-13 VITALS
DIASTOLIC BLOOD PRESSURE: 70 MMHG | TEMPERATURE: 98.1 F | HEART RATE: 80 BPM | RESPIRATION RATE: 16 BRPM | BODY MASS INDEX: 22.81 KG/M2 | HEIGHT: 64 IN | SYSTOLIC BLOOD PRESSURE: 136 MMHG | WEIGHT: 133.6 LBS

## 2020-07-13 DIAGNOSIS — M85.852 OSTEOPENIA OF LEFT HIP: ICD-10-CM

## 2020-07-13 DIAGNOSIS — Z17.0 MALIGNANT NEOPLASM OF UPPER-OUTER QUADRANT OF LEFT BREAST IN FEMALE, ESTROGEN RECEPTOR POSITIVE (HCC): Primary | ICD-10-CM

## 2020-07-13 DIAGNOSIS — Z17.0 MALIGNANT NEOPLASM OF UPPER-OUTER QUADRANT OF LEFT BREAST IN FEMALE, ESTROGEN RECEPTOR POSITIVE (HCC): ICD-10-CM

## 2020-07-13 DIAGNOSIS — C50.412 MALIGNANT NEOPLASM OF UPPER-OUTER QUADRANT OF LEFT BREAST IN FEMALE, ESTROGEN RECEPTOR POSITIVE (HCC): ICD-10-CM

## 2020-07-13 DIAGNOSIS — C50.412 MALIGNANT NEOPLASM OF UPPER-OUTER QUADRANT OF LEFT BREAST IN FEMALE, ESTROGEN RECEPTOR POSITIVE (HCC): Primary | ICD-10-CM

## 2020-07-13 LAB
ALBUMIN SERPL BCP-MCNC: 4.2 G/DL (ref 3.5–5.7)
ALP SERPL-CCNC: 56 U/L (ref 46–116)
ALT SERPL W P-5'-P-CCNC: 32 U/L (ref 12–78)
ANION GAP SERPL CALCULATED.3IONS-SCNC: 12 MMOL/L (ref 4–13)
AST SERPL W P-5'-P-CCNC: 41 U/L (ref 5–45)
BILIRUB SERPL-MCNC: 0.7 MG/DL (ref 0.2–1)
BUN SERPL-MCNC: 25 MG/DL (ref 5–25)
CALCIUM SERPL-MCNC: 10.1 MG/DL (ref 8.3–10.1)
CHLORIDE SERPL-SCNC: 99 MMOL/L (ref 98–108)
CO2 SERPL-SCNC: 34 MMOL/L (ref 21–32)
CREAT SERPL-MCNC: 1.1 MG/DL (ref 0.6–1.3)
GFR SERPL CREATININE-BSD FRML MDRD: 49 ML/MIN/1.73SQ M
GLUCOSE SERPL-MCNC: 91 MG/DL (ref 65–140)
POTASSIUM SERPL-SCNC: 4.6 MMOL/L (ref 3.5–5.3)
PROT SERPL-MCNC: 6.8 G/DL (ref 6.4–8.2)
SODIUM SERPL-SCNC: 145 MMOL/L (ref 136–145)

## 2020-07-13 PROCEDURE — 36415 COLL VENOUS BLD VENIPUNCTURE: CPT

## 2020-07-13 PROCEDURE — 80053 COMPREHEN METABOLIC PANEL: CPT

## 2020-07-13 PROCEDURE — 96401 CHEMO ANTI-NEOPL SQ/IM: CPT

## 2020-07-13 RX ADMIN — DENOSUMAB 60 MG: 60 INJECTION SUBCUTANEOUS at 10:58

## 2020-07-13 NOTE — PROGRESS NOTES
CrCl 33 4, Ca 10 1  Pt denies any recent or upcoming dental work  Pt denies any unusual muscle cramping or pain/swelling in gums/mouth/jaw  Pt tolerated injection in left arm  AVS printed  Pt ambulated off unit with steady gait

## 2020-07-13 NOTE — PROGRESS NOTES
Pt arrived on unit for prolia  CMP was not read from 7/1/20, only cbc resulted  Pt having a redraw at [facility] infusion lab, and then shot will be given

## 2020-11-04 ENCOUNTER — TELEPHONE (OUTPATIENT)
Dept: HEMATOLOGY ONCOLOGY | Facility: CLINIC | Age: 75
End: 2020-11-04

## 2020-11-18 DIAGNOSIS — Z17.0 MALIGNANT NEOPLASM OF UPPER-OUTER QUADRANT OF LEFT BREAST IN FEMALE, ESTROGEN RECEPTOR POSITIVE (HCC): ICD-10-CM

## 2020-11-18 DIAGNOSIS — M85.852 OSTEOPENIA OF LEFT HIP: Primary | ICD-10-CM

## 2020-11-18 DIAGNOSIS — C50.412 MALIGNANT NEOPLASM OF UPPER-OUTER QUADRANT OF LEFT BREAST IN FEMALE, ESTROGEN RECEPTOR POSITIVE (HCC): ICD-10-CM

## 2020-11-30 ENCOUNTER — TELEPHONE (OUTPATIENT)
Dept: HEMATOLOGY ONCOLOGY | Facility: CLINIC | Age: 75
End: 2020-11-30

## 2020-12-07 ENCOUNTER — TELEPHONE (OUTPATIENT)
Dept: SURGICAL ONCOLOGY | Facility: CLINIC | Age: 75
End: 2020-12-07

## 2020-12-08 ENCOUNTER — OFFICE VISIT (OUTPATIENT)
Dept: SURGICAL ONCOLOGY | Facility: CLINIC | Age: 75
End: 2020-12-08
Payer: COMMERCIAL

## 2020-12-08 ENCOUNTER — CLINICAL SUPPORT (OUTPATIENT)
Dept: RADIATION ONCOLOGY | Facility: CLINIC | Age: 75
End: 2020-12-08
Attending: RADIOLOGY

## 2020-12-08 ENCOUNTER — TELEMEDICINE (OUTPATIENT)
Dept: RADIATION ONCOLOGY | Facility: CLINIC | Age: 75
End: 2020-12-08
Attending: RADIOLOGY

## 2020-12-08 ENCOUNTER — TELEPHONE (OUTPATIENT)
Dept: HEMATOLOGY ONCOLOGY | Facility: CLINIC | Age: 75
End: 2020-12-08

## 2020-12-08 VITALS
TEMPERATURE: 97.6 F | DIASTOLIC BLOOD PRESSURE: 80 MMHG | HEART RATE: 86 BPM | WEIGHT: 130 LBS | BODY MASS INDEX: 22.2 KG/M2 | SYSTOLIC BLOOD PRESSURE: 146 MMHG | HEIGHT: 64 IN

## 2020-12-08 DIAGNOSIS — Z17.0 MALIGNANT NEOPLASM OF UPPER-OUTER QUADRANT OF LEFT BREAST IN FEMALE, ESTROGEN RECEPTOR POSITIVE (HCC): ICD-10-CM

## 2020-12-08 DIAGNOSIS — Z79.811 USE OF ANASTROZOLE: ICD-10-CM

## 2020-12-08 DIAGNOSIS — R92.2 DENSE BREAST TISSUE: ICD-10-CM

## 2020-12-08 DIAGNOSIS — Z17.0 MALIGNANT NEOPLASM OF UPPER-OUTER QUADRANT OF LEFT BREAST IN FEMALE, ESTROGEN RECEPTOR POSITIVE (HCC): Primary | ICD-10-CM

## 2020-12-08 DIAGNOSIS — C50.412 MALIGNANT NEOPLASM OF UPPER-OUTER QUADRANT OF LEFT BREAST IN FEMALE, ESTROGEN RECEPTOR POSITIVE (HCC): Primary | ICD-10-CM

## 2020-12-08 DIAGNOSIS — C50.412 MALIGNANT NEOPLASM OF UPPER-OUTER QUADRANT OF LEFT BREAST IN FEMALE, ESTROGEN RECEPTOR POSITIVE (HCC): ICD-10-CM

## 2020-12-08 PROBLEM — Z92.3 HISTORY OF RADIATION THERAPY: Status: RESOLVED | Noted: 2019-01-01 | Resolved: 2020-12-08

## 2020-12-08 PROBLEM — Z92.3 S/P RADIOTHERAPY: Status: RESOLVED | Noted: 2019-11-05 | Resolved: 2020-12-08

## 2020-12-08 PROCEDURE — 99214 OFFICE O/P EST MOD 30 MIN: CPT | Performed by: SURGERY

## 2020-12-08 RX ORDER — SPIRONOLACTONE 50 MG/1
50 TABLET, FILM COATED ORAL DAILY
COMMUNITY

## 2020-12-08 RX ORDER — ANASTROZOLE 1 MG/1
1 TABLET ORAL DAILY
Qty: 90 TABLET | Refills: 3 | Status: SHIPPED | OUTPATIENT
Start: 2020-12-08 | End: 2021-12-20 | Stop reason: SDUPTHER

## 2021-01-13 ENCOUNTER — HOSPITAL ENCOUNTER (OUTPATIENT)
Dept: INFUSION CENTER | Facility: CLINIC | Age: 76
Discharge: HOME | End: 2021-01-13
Payer: COMMERCIAL

## 2021-01-13 VITALS — HEIGHT: 64 IN | TEMPERATURE: 98.1 F | BODY MASS INDEX: 22.32 KG/M2 | WEIGHT: 130.73 LBS

## 2021-01-13 DIAGNOSIS — Z17.0 MALIGNANT NEOPLASM OF UPPER-OUTER QUADRANT OF LEFT BREAST IN FEMALE, ESTROGEN RECEPTOR POSITIVE (HCC): Primary | ICD-10-CM

## 2021-01-13 DIAGNOSIS — M85.852 OSTEOPENIA OF LEFT HIP: ICD-10-CM

## 2021-01-13 DIAGNOSIS — C50.412 MALIGNANT NEOPLASM OF UPPER-OUTER QUADRANT OF LEFT BREAST IN FEMALE, ESTROGEN RECEPTOR POSITIVE (HCC): Primary | ICD-10-CM

## 2021-01-13 PROCEDURE — 96402 CHEMO HORMON ANTINEOPL SQ/IM: CPT

## 2021-01-13 RX ADMIN — DENOSUMAB 60 MG: 60 INJECTION SUBCUTANEOUS at 10:48

## 2021-01-13 NOTE — PROGRESS NOTES
Patient arrived on unit for Prolia  Denies any dental pain/issues/upcoming procedures  Cleared for prolia today  Tolerated 1 injection in [place]  Patient has f/u with [facility] and will make next infusion appointments after    AVS given to patient

## 2021-02-02 ENCOUNTER — TELEPHONE (OUTPATIENT)
Dept: HEMATOLOGY ONCOLOGY | Facility: CLINIC | Age: 76
End: 2021-02-02

## 2021-02-02 NOTE — TELEPHONE ENCOUNTER
Spoke to patient and she stated that due to the weather she would prefer to have a virtual appt, as a telephone call  Per ALLEGRA Díaz a telephone visit would be fine  Called patient back and informed her she will be called around her appt time of 2:00 pm, patient voiced understanding and gave me the number of [phone] to call for her telephone visit

## 2021-02-03 ENCOUNTER — TELEMEDICINE (OUTPATIENT)
Dept: HEMATOLOGY ONCOLOGY | Facility: CLINIC | Age: 76
End: 2021-02-03
Payer: COMMERCIAL

## 2021-02-03 DIAGNOSIS — C50.412 MALIGNANT NEOPLASM OF UPPER-OUTER QUADRANT OF LEFT BREAST IN FEMALE, ESTROGEN RECEPTOR POSITIVE (HCC): Primary | ICD-10-CM

## 2021-02-03 DIAGNOSIS — Z17.0 MALIGNANT NEOPLASM OF UPPER-OUTER QUADRANT OF LEFT BREAST IN FEMALE, ESTROGEN RECEPTOR POSITIVE (HCC): Primary | ICD-10-CM

## 2021-02-03 PROCEDURE — 99214 OFFICE O/P EST MOD 30 MIN: CPT | Performed by: PHYSICIAN ASSISTANT

## 2021-02-03 NOTE — PROGRESS NOTES
Virtual Brief Visit    Assessment/Plan:    Problem List Items Addressed This Visit     None                Reason for visit is No chief complaint on file  Encounter provider Vijaya Todd PA-C    Provider located at [address] [place] [phone]    Recent Visits  Date Type Provider Dept   02/02/21 Telephone Isi Yepez MA [facility] [place]   Showing recent visits within past 7 days and meeting all other requirements     Today's Visits  Date Type Provider Dept   02/03/21 Telemedicine Vijaya Todd PA-C [facility] [place]   Showing today's visits and meeting all other requirements     Future Appointments  No visits were found meeting these conditions  Showing future appointments within next 150 days and meeting all other requirements        After connecting through telephone, the patient was identified by name and date of birth  Liane Rutherford was informed that this is a telemedicine visit and that the visit is being conducted through telephone  My office door was closed  No one else was in the room  She acknowledged consent and understanding of privacy and security of the platform  The patient has agreed to participate and understands she can discontinue the visit at any time  Patient is aware this is a billable service  Subjective    Liane Rutherford is a 66 y o  female presents for follow up for history of breast cancer  Oncology History   Malignant neoplasm of upper-outer quadrant of left breast in female, estrogen receptor positive ([address] )   5/30/2019 Initial Diagnosis    Breast cancer ([facility] [address] )     5/30/2019 Biopsy    Left breast, ultrasound-guided biopsy, 200 8cmfn 4 passes 12g Marquee:  - Invasive mammary carcinoma of no special type (ductal, not otherwise specified)    - grade 2  - %  - %  - HER2 by FISH negative     6/2019 Genetic Testing    BRCA negative     7/8/2019 -  Cancer Staged    Staging form: Breast, AJCC 8th Edition  - Clinical: Stage IB (cT2, cN0, cM0, G2, ER+, UT+, HER2-) - Signed by Maricarmen Stoddard MD on 7/8/2019  Laterality: Left  Method of lymph node assessment: Clinical  Histologic grading system: 3 grade system       7/26/2019 Surgery    Left breast lumpectomy with SLN biopsy:  - Invasive mammary carcinoma, 2 7 cm, grade 2  - approximately 80% of tumor is DCIS  - LN's - one lymph node with metastatic carcinoma, 2 1mm deposit, no extranodal extention  - + LVI  - margins negative     8/12/2019 -  Cancer Staged    Staging form: Breast, AJCC 8th Edition  - Pathologic: Stage IB (pT2, pN1(sn), cM0, G2, ER+, UT+, HER2-) - Signed by Maricarmen Stoddard MD on 8/12/2019  Neoadjuvant therapy: No  Laterality: Left  Method of lymph node assessment: [place] lymph node biopsy  Histologic grading system: 3 grade system       9/25/2019 - 10/23/2019 Radiation    Plan ID Energy Fractions Dose per Fraction (cGy) Dose Correction (cGy) Total Dose Delivered (cGy) Elapsed Days   BH L Breast 6X 16 / 16 266 0 4,256 21   L Brst Boost 12E 5 / 5 200 0 1,000 6     Dr Nilda Greene     Ovarian cancer [facility])   2005 Initial Diagnosis    Ovarian cancer [facility])     2005 Surgery    MARCO/BSO    Dr Kristian Seth     2005 -  Chemotherapy    Dr Kristian Seth       64-year-old female (ECOG 0) with history of invasive mammary carcinoma who is status post lumpectomy and sentinel lymph node sampling  Patient's tumor measured 2 7 cm, grade 2 of 3   Approximately 80% of tumor is DCIS       3 sentinel lymph nodes were removed   1 had focus of metastatic carcinoma measuring 2 1 mm; however negative for extranodal extension      Patient's anatomic stage is at least stage IIB, T2, N1 a, C M0, G2   %, %, her 2-by FISH     She had postoperative complications with hematoma formation for which she has been following Surgical Oncology  Demario Valdez states that she continues to apply warm compresses to the area and it is improving   She has follow-up with Surgical Oncology for routine visit in 2019       Following surgery patient was noted to have very slight invasion into 1 lymph node, stage IIB   She was offered chemotherapy   He was to proceed with aromatase inhibitor alone      She is on anastrozole 1 mg  Patient has had genetic testing in the past due to history of ovarian cancer and is negative       Past Medical History:   Diagnosis Date    Basal cell carcinoma     Bulbar polio     Colon polyp     Curvature of spine     Exercise involving walking     "at work alot"    History of anemia as a child     "after polio"    History of radiation therapy     left breast    Lymphedema     left leg/"after abdominal lymph nodes removed"/wears thigh high compresion stocking"    Macular degeneration     "and slight glaucoma per eye doctor both eyes"    Muscle weakness     "from polio, predominantly right side"    Osteopenia     Osteoporosis     Ovarian cancer ([address] )     radical hysterectomy w/ chemo    Scoliosis     with right hip pain occas    Swallowing difficulty     "at times since polio"    Thyroid nodule     Wears glasses        Past Surgical History:   Procedure Laterality Date    ABDOMINAL HYSTERECTOMY N/A     ovarian cancer    BOWEL RESECTION      BREAST BIOPSY Left 2015    BREAST BIOPSY Right 2014    BREAST BIOPSY Left     BREAST BIOPSY Left 2019    [facility]    BREAST LUMPECTOMY Left 2019    Procedure: LUMPECTOMY BREAST NEEDLE LOC at 1100;  Surgeon: Maricarmen Stoddard MD;  [facility];  Service: Surgical Oncology    CATARACT EXTRACTION Right      SECTION      COLONOSCOPY      HYSTERECTOMY      age 56    LYMPH NODE BIOPSY Left 2019    Procedure: SENTINEL NODE BIOPSY- [address] 6885 at 1200;  Surgeon: Maricarmen Stoddard MD;  [facility];  Service: Surgical Oncology    LYMPHADENECTOMY      MAMMO NEEDLE LOCALIZATION LEFT (ALL INC) Left 7/26/2019    SKIN CANCER EXCISION      US GUIDED BREAST BIOPSY LEFT COMPLETE Left 5/30/2019       Current Outpatient Medications   Medication Sig Dispense Refill    anastrozole (ARIMIDEX) 1 mg tablet Take 1 tablet (1 mg total) by mouth daily 90 tablet 3    aspirin 81 MG tablet Take by mouth      Calcium Citrate-Vitamin D (CALCIUM CITRATE + D) 250-200 MG-UNIT TABS Take by mouth      Denosumab (PROLIA SC) Inject under the skin every 6 (six) months      HM VITAMIN D3 2000 units CAPS Take by mouth      multivitamin (THERAGRAN) TABS Take 1 tablet by mouth      pyridoxine (VITAMIN B6) 100 mg tablet Take by mouth      spironolactone (ALDACTONE) 50 mg tablet Take 50 mg by mouth daily       No current facility-administered medications for this visit  Allergies   Allergen Reactions    Metoclopramide Myalgia     Reaction Date: 03Jun2011; Annotation - [id number]: hand cramps and biting of cheeks       Review of Systems   Constitutional: Negative for appetite change, chills, fatigue, fever and unexpected weight change  Respiratory: Negative for cough and shortness of breath  Cardiovascular: Negative for chest pain, palpitations and leg swelling  Gastrointestinal: Negative for abdominal pain, blood in stool, constipation, diarrhea, nausea and vomiting  Endocrine:        Occasional hot flashes, worsens when doing a lot of activity     Genitourinary: Negative for difficulty urinating, dysuria, hematuria and urgency  Musculoskeletal: Positive for arthralgias (age related, not worse with AI; mild in nature)  Skin: Negative  Neurological: Negative for dizziness, weakness, light-headedness, numbness and headaches  Hematological: Negative  Psychiatric/Behavioral: Negative  There were no vitals filed for this visit      1  Malignant neoplasm of upper-outer quadrant of left breast in female, estrogen receptor positive [facility])  64-year-old female presents for follow-up regarding history of ERPR positive HER2 negative breast cancer  She is on adjuvant aromatase inhibitor  She is on anastrozole  She tolerates this well  She is continuing to take calcium vitamin-D  She continues to stay active  She actually continues to work as well  She also is on Prolia for osteoporosis  She has had no issues with this  Most recent dose in January  Will coordinate her next visit for the same day is receiving her Prolia injection in July 2021  She states that she does not need refills at this time  I spent 7 minutes directly with the patient during this visit    [address] acknowledges that she has consented to an online visit or consultation  She understands that the online visit is based solely on information provided by her, and that, in the absence of a face-to-face physical evaluation by the physician, the diagnosis she receives is both limited and provisional in terms of accuracy and completeness  This is not intended to replace a full medical face-to-face evaluation by the physician  Sona Larios understands and accepts these terms

## 2021-05-05 DIAGNOSIS — Z17.0 MALIGNANT NEOPLASM OF UPPER-OUTER QUADRANT OF LEFT BREAST IN FEMALE, ESTROGEN RECEPTOR POSITIVE (HCC): ICD-10-CM

## 2021-05-05 DIAGNOSIS — C50.412 MALIGNANT NEOPLASM OF UPPER-OUTER QUADRANT OF LEFT BREAST IN FEMALE, ESTROGEN RECEPTOR POSITIVE (HCC): ICD-10-CM

## 2021-05-05 DIAGNOSIS — M85.852 OSTEOPENIA OF LEFT HIP: Primary | ICD-10-CM

## 2021-06-14 ENCOUNTER — HOSPITAL ENCOUNTER (OUTPATIENT)
Dept: MAMMOGRAPHY | Facility: CLINIC | Age: 76
Discharge: HOME | End: 2021-06-14
Payer: COMMERCIAL

## 2021-06-14 VITALS — HEIGHT: 62 IN | WEIGHT: 130 LBS | BODY MASS INDEX: 23.92 KG/M2

## 2021-06-14 DIAGNOSIS — C50.412 MALIGNANT NEOPLASM OF UPPER-OUTER QUADRANT OF LEFT BREAST IN FEMALE, ESTROGEN RECEPTOR POSITIVE (HCC): ICD-10-CM

## 2021-06-14 DIAGNOSIS — Z17.0 MALIGNANT NEOPLASM OF UPPER-OUTER QUADRANT OF LEFT BREAST IN FEMALE, ESTROGEN RECEPTOR POSITIVE (HCC): ICD-10-CM

## 2021-06-14 PROCEDURE — 77066 DX MAMMO INCL CAD BI: CPT

## 2021-06-14 PROCEDURE — G0279 TOMOSYNTHESIS, MAMMO: HCPCS

## 2021-06-17 ENCOUNTER — TELEPHONE (OUTPATIENT)
Dept: RADIATION ONCOLOGY | Facility: CLINIC | Age: 76
End: 2021-06-17

## 2021-06-18 ENCOUNTER — RADIATION ONCOLOGY FOLLOW-UP (OUTPATIENT)
Dept: RADIATION ONCOLOGY | Facility: CLINIC | Age: 76
End: 2021-06-18
Attending: RADIOLOGY

## 2021-06-18 ENCOUNTER — CLINICAL SUPPORT (OUTPATIENT)
Dept: RADIATION ONCOLOGY | Facility: CLINIC | Age: 76
End: 2021-06-18
Attending: RADIOLOGY

## 2021-06-18 VITALS
BODY MASS INDEX: 24.22 KG/M2 | RESPIRATION RATE: 18 BRPM | OXYGEN SATURATION: 98 % | WEIGHT: 131.61 LBS | SYSTOLIC BLOOD PRESSURE: 120 MMHG | HEIGHT: 62 IN | DIASTOLIC BLOOD PRESSURE: 72 MMHG | TEMPERATURE: 97.1 F | HEART RATE: 73 BPM

## 2021-06-18 DIAGNOSIS — C50.412 MALIGNANT NEOPLASM OF UPPER-OUTER QUADRANT OF LEFT BREAST IN FEMALE, ESTROGEN RECEPTOR POSITIVE (HCC): Primary | ICD-10-CM

## 2021-06-18 DIAGNOSIS — Z17.0 MALIGNANT NEOPLASM OF UPPER-OUTER QUADRANT OF LEFT BREAST IN FEMALE, ESTROGEN RECEPTOR POSITIVE (HCC): Primary | ICD-10-CM

## 2021-06-18 NOTE — PROGRESS NOTES
Follow-up - Radiation Oncology   Tahir Malhotrasasha 1945 66 y o  female [id number]      History of Present Illness   Cancer Staging  Malignant neoplasm of upper-outer quadrant of left breast in female, estrogen receptor positive ([facility] [address] )  Staging form: Breast, AJCC 8th Edition  - Clinical: Stage IB (cT2, cN0, cM0, G2, ER+, CA+, HER2-) - Signed by Shelby Martins MD on 7/8/2019  Method of lymph node assessment: Clinical  Histologic grading system: 3 grade system  Laterality: Left  - Pathologic: Stage IB (pT2, pN1(sn), cM0, G2, ER+, CA+, HER2-) - Signed by Shelby Martins MD on 8/12/2019  Neoadjuvant therapy: No  Method of lymph node assessment: [place] lymph node biopsy  Histologic grading system: 3 grade system  Laterality: Left            Interval History:  Fransisco Del Real is a 66year old female who completed a course of adjuvant radiation therapy for Stage IB (pT2, pN1(sn), cM0, G2, ER+, CA+, HER2-)  left breast carcinoma on 10/23/19  She was started on adjuant hormonal therapy with anastrozole in September 2019       She has history of ovarian cancer in 2005, genetic testing was negative      She was last seen for telemedicine visit on 12/8/20 12/8/20 Dr Dayna Jones f/u  DUSTIN on exam     2/3/21 Jessie Valente [place] with Hematology Onc f/u  Continue anastrozole, toleratng well     6/14/21 Mammo diagnostic bilateral w 3d & cad  IMPRESSION:   Findings are benign   No mammographic evidence of malignancy       ASSESSMENT/BI-RADS CATEGORY:  Left: 2 - Benign  Right: 2 - Benign  Overall: 2 - Benign     RECOMMENDATION:Diagnostic mammogram in 1 year for both breasts         Today, she reports feeling well overall  She denies breast pain, she does report some numbness at the left breast incisional area, which is unchanged since surgery  No breast or arm swelling   Tolerating anastrozole well, no adverse effects reported             6/23/21 Dr Dayna Jones  7/13/21 Hematology Onc  6/10/22 Dr Gabby Arboleda Information   Oncology History   Malignant neoplasm of upper-outer quadrant of left breast in female, estrogen receptor positive ([facility] [address] )   5/30/2019 Initial Diagnosis    [facility] )     5/30/2019 Biopsy    Left breast, ultrasound-guided biopsy, 200 8cmfn 4 passes 12g Marquee:  - Invasive mammary carcinoma of no special type (ductal, not otherwise specified)    - grade 2  - %  - %  - HER2 by FISH negative     6/2019 Genetic Testing    BRCA negative     7/8/2019 -  Cancer Staged    Staging form: Breast, AJCC 8th Edition  - Clinical: Stage IB (cT2, cN0, cM0, G2, ER+, UT+, HER2-) - Signed by Armando Marquez MD on 7/8/2019  Laterality: Left  Method of lymph node assessment: Clinical  Histologic grading system: 3 grade system       7/26/2019 Surgery    Left breast lumpectomy with SLN biopsy:  - Invasive mammary carcinoma, 2 7 cm, grade 2  - approximately 80% of tumor is DCIS  - LN's - one lymph node with metastatic carcinoma, 2 1mm deposit, no extranodal extention  - + LVI  - margins negative     8/12/2019 -  Cancer Staged    Staging form: Breast, AJCC 8th Edition  - Pathologic: Stage IB (pT2, pN1(sn), cM0, G2, ER+, UT+, HER2-) - Signed by Armando Marquez MD on 8/12/2019  Neoadjuvant therapy: No  Laterality: Left  Method of lymph node assessment: [place] lymph node biopsy  Histologic grading system: 3 grade system       9/25/2019 - 10/23/2019 Radiation    Plan ID Energy Fractions Dose per Fraction (cGy) Dose Correction (cGy) Total Dose Delivered (cGy) Elapsed Days   BH L Breast 6X 16 / 16 266 0 4,256 21   L Brst Boost 12E 5 / 5 200 0 1,000 6      0533 Litigain     9/2019 -  Hormone Therapy    Anastrozole 1 mg daily      Ovarian cancer ([facility] [address] )   2005 Initial Diagnosis    Ovarian cancer [facility])     2005 Surgery    MARCO/BSO    Dr Yani Casas     2005 -  Chemotherapy    Dr Yani Casas         Past Medical History:   Diagnosis Date    Basal cell carcinoma     BRCA1 negative     BRCA2 negative     Breast cancer ([facility] [address] ) 07/26/2019 left    Bulbar polio     Cancer ([facility] [address] ) 2019    Breast    Cataract     Colon polyp     Curvature of spine     Exercise involving walking     "at work alot"    History of anemia as a child     "after polio"    History of radiation therapy 2019    left breast    Lymphedema     left leg/"after abdominal lymph nodes removed"/wears thigh high compresion stocking"    Macular degeneration     "and slight glaucoma per eye doctor both eyes"    Muscle weakness     "from polio, predominantly right side"    Osteopenia     Osteoporosis     Ovarian cancer ([facility] [address] )     radical hysterectomy w/ chemo    Scoliosis     with right hip pain occas    Swallowing difficulty     "at times since polio"    Thyroid nodule     Wears glasses      Past Surgical History:   Procedure Laterality Date    ABDOMINAL HYSTERECTOMY N/A     ovarian cancer    BOWEL RESECTION      BREAST BIOPSY Left 2015    BREAST BIOPSY Right 2014    BREAST BIOPSY Left     BREAST BIOPSY Left 2019    [facility]    BREAST LUMPECTOMY Left 2019    Procedure: LUMPECTOMY BREAST NEEDLE LOC at 1100;  Surgeon: Massiel Russell MD;  Location: [facility];  Service: Surgical Oncology    CATARACT EXTRACTION Right     CATARACT EXTRACTION Left 2021     SECTION      COLONOSCOPY      HYSTERECTOMY      age 56    LYMPH NODE BIOPSY Left 2019    Procedure: SENTINEL NODE BIOPSY- NUC MED INJ at 1200;  Surgeon: Massiel Russell MD;  Location: [facility];  Service: Surgical Oncology    LYMPHADENECTOMY      MAMMO NEEDLE LOCALIZATION LEFT (ALL INC) Left 2019    SKIN CANCER EXCISION      US GUIDED BREAST BIOPSY LEFT COMPLETE Left 2019       Social History   Social History     Substance and Sexual Activity   Alcohol Use Yes    Alcohol/week: 1 0 standard drinks    Types: 1 Glasses of wine per week    Comment: occasional     Social History     Substance and Sexual Activity   Drug Use No     Social History     Tobacco Use Smoking Status Former Smoker    Packs/day: 0 50    Years: 45 00    Pack years: 22 50    Quit date:     Years since quittin 4   Smokeless Tobacco Never Used   Tobacco Comment    Quit         Meds/Allergies     Current Outpatient Medications:     anastrozole (ARIMIDEX) 1 mg tablet, Take 1 tablet (1 mg total) by mouth daily, Disp: 90 tablet, Rfl: 3    aspirin 81 MG tablet, Take by mouth, Disp: , Rfl:     Calcium Citrate-Vitamin D (CALCIUM CITRATE + D) 250-200 MG-UNIT TABS, Take by mouth, Disp: , Rfl:     Denosumab (PROLIA SC), Inject under the skin every 6 (six) months, Disp: , Rfl:     HM VITAMIN D3 2000 units CAPS, Take by mouth, Disp: , Rfl:     multivitamin (THERAGRAN) TABS, Take 1 tablet by mouth, Disp: , Rfl:     pyridoxine (VITAMIN B6) 100 mg tablet, Take by mouth, Disp: , Rfl:     spironolactone (ALDACTONE) 50 mg tablet, Take 50 mg by mouth daily, Disp: , Rfl:   Allergies   Allergen Reactions    Metoclopramide Myalgia     Reaction Date: 2011; Annotation - [id number]: hand cramps and biting of cheeks         Review of Systems   Constitutional: Positive for fatigue (stable; still doing office work, working full time)  Negative for appetite change and unexpected weight change  HENT: Negative  Eyes: Negative  Negative for visual disturbance  Wears glasses   Respiratory: Negative  Negative for shortness of breath  Cardiovascular: Negative  Negative for chest pain  Gastrointestinal: Negative  Endocrine: Positive for heat intolerance (can't tolerate humidity)  Genitourinary: Negative  Negative for difficulty urinating, vaginal bleeding and vaginal discharge  Musculoskeletal: Positive for gait problem (mild limp due to scoliosis, more pronounced when she's tired)  Mild scoliosis, occasional right hip discomfort  Intermittent ache/stiffness of joints  Skin: Negative  Negative for color change, rash and wound  Allergic/Immunologic: Negative  Neurological: Negative for dizziness, light-headedness and numbness  Hematological: Bruises/bleeds easily  Psychiatric/Behavioral: Negative  Negative for dysphoric mood and sleep disturbance           OBJECTIVE:   /72   Pulse 73   Temp (!) 97 1 °F (36 2 °C) (Temporal)   Resp 18   Ht 5' 2" (1 575 m)   Wt 59 7 kg (131 lb 9 8 oz)   SpO2 98%   BMI 24 07 kg/m²   Pain Assessment:  0  ECOG/Zubrod/WHO: 0 - Asymptomatic     physical exam significant for no supraclavicular or axillary adenopathy palpable  The skin in the radiated field is in good condition  No suspicious lesions palpable in either breast   No breast or arm edema  Her breathing is unlabored  Abdomen soft nontender  No focal neurologic deficits  RESULTS    Lab Results: No results found for this or any previous visit (from the past 672 hour(s))  Imaging Studies:Mammo diagnostic bilateral w 3d & cad    Result Date: 6/14/2021  Narrative: DIAGNOSIS: Malignant neoplasm of upper-outer quadrant of left breast in female, estrogen receptor positive ([facility] [address] ) TECHNIQUE: Digital screening mammography was performed  Computer Aided Detection (CAD) analyzed all applicable images  COMPARISONS: Prior breast imaging dated: 06/11/2020, 05/21/2019, 05/10/2018, 05/04/2017, 05/10/2016, 05/14/2015, 11/24/2014, 06/11/2014, 04/23/2014, 04/21/2014, 04/19/2013, 10/08/2012, 04/27/2012, and 04/19/2012 RELEVANT HISTORY: Family Breast Cancer History: History of breast cancer in Mother  Family Medical History: Family medical history includes breast cancer in mother and colon cancer in maternal uncle  Personal History: Hormone history includes estrogen replacement therapy and other  Surgical history includes breast biopsy, lumpectomy, and hysterectomy  Medical history includes breast cancer, ovarian cancer, BRCA 1 negative, and BRCA 2 negative   RISK ASSESSMENT: 5 Year Tyrer-Cuzick: 4 6 % 10 Year Tyrer-Cuzick: 9 49 % Lifetime Tyrer-Cuzick: 9 49 % TISSUE DENSITY: The breasts are heterogeneously dense, which may obscure small masses  INDICATION: Vesta Mejia is a 66 y o  female presenting for yearly  Annual mammogram, history of left breast cancer status post lumpectomy and radiation therapy  FINDINGS: Bilateral There are no suspicious masses, grouped microcalcifications or areas of architectural distortion  The skin and nipple areolar complex are unremarkable  There are stable postsurgical changes in the upper-outer quadrant of the left breast   There are round calcifications bilaterally which appear stable  There are vascular calcifications  Impression:  Findings are benign  No mammographic evidence of malignancy  ASSESSMENT/BI-RADS CATEGORY: Left: 2 - Benign Right: 2 - Benign Overall: 2 - Benign RECOMMENDATION:      - Diagnostic mammogram in 1 year for both breasts  Workstation ID: [id number]          Assessment/Plan:  No orders of the defined types were placed in this encounter  Vesta Mejia is a 66y o  year old female   Who is 1 and half years status post adjuvant radiation therapy for left breast carcinoma stage I B  She has no clinical evidence of recurrence  She remains on anastrozole with good tolerance  Mammogram from 6/14/2021 returned stable  She will return for follow-up visit in 1 year  Alma Handley MD  6/18/2021,9:21 AM    Portions of the record may have been created with voice recognition software   Occasional wrong word or "sound a like" substitutions may have occurred due to the inherent limitations of voice recognition software   Read the chart carefully and recognize, using context, where substitutions have occurred

## 2021-06-18 NOTE — PROGRESS NOTES
Parvin Rincon 1945 is a 66 y o  female       Follow up visit       Parvin Rincon is a 66year old female who completed a course of adjuvant radiation therapy for Stage IB (pT2, pN1(sn), cM0, G2, ER+, MO+, HER2-)  left breast carcinoma on 10/23/19  She was started on adjuant hormonal therapy with anastrozole in 2019  She has history of ovarian cancer in 2005, genetic testing was negative  She was last seen for telemedicine visit on 20 Dr Dorys Gonsalves f/u  DUSTIN on exam    2/3/21 [place], [place] with Hematology Onc f/u  Continue anastrozole, toleratng well    21 Mammo diagnostic bilateral w 3d & cad  IMPRESSION:   Findings are benign  No mammographic evidence of malignancy  ASSESSMENT/BI-RADS CATEGORY:  Left: 2 - Benign  Right: 2 - Benign  Overall: 2 - Benign     RECOMMENDATION:Diagnostic mammogram in 1 year for both breasts  Today, she reports feeling well overall  She denies breast pain, she does report some numbness at the left breast incisional area, which is unchanged since surgery  No breast or arm swelling  Tolerating anastrozole well, no adverse effects reported  21 Dr Dorys Gonsalves  21 Hematology Onc  6/10/22 Dr Uli Heath      Oncology History   Malignant neoplasm of upper-outer quadrant of left breast in female, estrogen receptor positive ([facility] [place] [address] )   2019 Initial Diagnosis    Breast cancer ([facility] [address] )     2019 Biopsy    Left breast, ultrasound-guided biopsy, 200 8cmfn 4 passes 12g Marquee:  - Invasive mammary carcinoma of no special type (ductal, not otherwise specified)    - grade 2  - %  - %  - HER2 by FISH negative     2019 Genetic Testing    BRCA negative     2019 -  Cancer Staged    Staging form: Breast, AJCC 8th Edition  - Clinical: Stage IB (cT2, cN0, cM0, G2, ER+, MO+, HER2-) - Signed by Franck Leon MD on 2019  Laterality: Left  Method of lymph node assessment: Clinical  Histologic grading system: 3 grade system       7/26/2019 Surgery    Left breast lumpectomy with SLN biopsy:  - Invasive mammary carcinoma, 2 7 cm, grade 2  - approximately 80% of tumor is DCIS  - LN's - one lymph node with metastatic carcinoma, 2 1mm deposit, no extranodal extention  - + LVI  - margins negative     8/12/2019 -  Cancer Staged    Staging form: Breast, AJCC 8th Edition  - Pathologic: Stage IB (pT2, pN1(sn), cM0, G2, ER+, HI+, HER2-) - Signed by Nadine Cabral MD on 8/12/2019  Neoadjuvant therapy: No  Laterality: Left  Method of lymph node assessment: [place] lymph node biopsy  Histologic grading system: 3 grade system       9/25/2019 - 10/23/2019 Radiation    Plan ID Energy Fractions Dose per Fraction (cGy) Dose Correction (cGy) Total Dose Delivered (cGy) Elapsed Days   BH L Breast 6X 16 / 16 266 0 4,256 21   L Brst Boost 12E 5 / 5 200 0 1,000 6     Dr Cristal Salas     9/2019 -  Hormone Therapy    Anastrozole 1 mg daily      Ovarian cancer ([facility] [address] )   2005 Initial Diagnosis    Ovarian cancer [facility])     2005 Surgery    MARCO/BSO    Dr Cristian Amaro     2005 -  Chemotherapy    Dr Carrie Delgado Trial: no      Health Maintenance   Topic Date Due    Hepatitis C Screening  Never done    DTaP,Tdap,and Td Vaccines (1 - Tdap) Never done    Fall Risk  Never done    Influenza Vaccine (Season Ended) 09/01/2021    Annual Physical  06/07/2022    BMI: Adult  06/14/2022    Depression Screening PHQ  06/18/2022    Pneumococcal Vaccine: 65+ Years  Completed    COVID-19 Vaccine  Completed    HIB Vaccine  Aged Out    Hepatitis B Vaccine  Aged Out    IPV Vaccine  Aged Out    Hepatitis A Vaccine  Aged Out    Meningococcal ACWY Vaccine  Aged Out    HPV Vaccine  Aged Out       Patient Active Problem List   Diagnosis    Malignant neoplasm of upper-outer quadrant of left breast in female, estrogen receptor positive ([address] )    BRCA negative    Ovarian cancer ([facility] [address] )    Use of anastrozole    Osteopenia of left hip    Encounter for gynecological examination (general) (routine) without abnormal findings    Dense breast tissue     Past Medical History:   Diagnosis Date    Basal cell carcinoma     BRCA1 negative     BRCA2 negative     Breast cancer ([facility] [address] ) 2019    left    Bulbar polio     Cancer ([facility] [address] ) 2019    Breast    Colon polyp     Curvature of spine     Exercise involving walking     "at work alot"    History of anemia as a child     "after polio"    History of radiation therapy 2019    left breast    Lymphedema     left leg/"after abdominal lymph nodes removed"/wears thigh high compresion stocking"    Macular degeneration     "and slight glaucoma per eye doctor both eyes"    Muscle weakness     "from polio, predominantly right side"    Osteopenia     Osteoporosis     Ovarian cancer ([facility] [address] )     radical hysterectomy w/ chemo    Scoliosis     with right hip pain occas    Swallowing difficulty     "at times since polio"    Thyroid nodule     Wears glasses      Past Surgical History:   Procedure Laterality Date    ABDOMINAL HYSTERECTOMY N/A     ovarian cancer    BOWEL RESECTION      BREAST BIOPSY Left 2015    BREAST BIOPSY Right 2014    BREAST BIOPSY Left     BREAST BIOPSY Left 2019    [facility]    BREAST LUMPECTOMY Left 2019    Procedure: LUMPECTOMY BREAST NEEDLE LOC at 1100;  Surgeon: Taya Alejandre MD;  [facility];  Service: Surgical Oncology    CATARACT EXTRACTION Right      SECTION      COLONOSCOPY      HYSTERECTOMY      age 56    LYMPH NODE BIOPSY Left 2019    Procedure: SENTINEL NODE BIOPSY- NUC MED INJ at 1200;  Surgeon: Taya Alejandre MD;  [facility];  Service: Surgical Oncology    LYMPHADENECTOMY      MAMMO NEEDLE LOCALIZATION LEFT (ALL INC) Left 2019    SKIN CANCER EXCISION      US GUIDED BREAST BIOPSY LEFT COMPLETE Left 2019     Family History   Problem Relation Age of Onset    Heart failure Mother     Breast cancer Mother 78    Hypertension Father     Stroke Father     No Known Problems Brother     No Known Problems Brother     No Known Problems Daughter     No Known Problems Maternal Grandmother     Cancer Maternal Grandfather 64        chest and neck    No Known Problems Paternal Grandmother     Cancer Paternal Grandfather         unknown type and age   Dominic Dhaliwal No Known Problems Daughter     Stomach cancer Maternal Aunt 46    Colon cancer Maternal Uncle 71    Lung cancer Maternal Aunt 48    No Known Problems Paternal Aunt     No Known Problems Paternal Aunt     No Known Problems Paternal Aunt      Social History     Socioeconomic History    Marital status: /Civil Union     Spouse name: Not on file    Number of children: Not on file    Years of education: Not on file    Highest education level: Not on file   Occupational History    Not on file   Tobacco Use    Smoking status: Former Smoker     Packs/day: 0 50     Years: 45 00     Pack years: 22 50     Quit date:      Years since quittin 4    Smokeless tobacco: Never Used    Tobacco comment: Quit   Vaping Use    Vaping Use: Never assessed   Substance and Sexual Activity    Alcohol use: Yes     Alcohol/week: 1 0 standard drinks     Types: 1 Glasses of wine per week     Comment: occasional    Drug use: No    Sexual activity: Not Currently     Partners: Male     Birth control/protection: Post-menopausal, Surgical     Comment:    Other Topics Concern    Not on file   Social History Narrative    Not on file     Social Determinants of Health     Financial Resource Strain:     Difficulty of Paying Living Expenses:    Food Insecurity:     Worried About Running Out of Food in the Last Year:     Ran Out of Food in the Last Year:    Transportation Needs:     Lack of Transportation (Medical):      Lack of Transportation (Non-Medical):    Physical Activity:     Days of Exercise per Week:     Minutes of Exercise per Session:    Stress:     Feeling of Stress :    Social Connections:     Frequency of Communication with Friends and Family:     Frequency of Social Gatherings with Friends and Family:     Attends Confucianism Services:     Active Member of Clubs or Organizations:     Attends Club or Organization Meetings:     Marital Status:    Intimate Partner Violence:     Fear of Current or Ex-Partner:     Emotionally Abused:     Physically Abused:     Sexually Abused:        Current Outpatient Medications:     anastrozole (ARIMIDEX) 1 mg tablet, Take 1 tablet (1 mg total) by mouth daily, Disp: 90 tablet, Rfl: 3    aspirin 81 MG tablet, Take by mouth, Disp: , Rfl:     Calcium Citrate-Vitamin D (CALCIUM CITRATE + D) 250-200 MG-UNIT TABS, Take by mouth, Disp: , Rfl:     Denosumab (PROLIA SC), Inject under the skin every 6 (six) months, Disp: , Rfl:     HM VITAMIN D3 2000 units CAPS, Take by mouth, Disp: , Rfl:     multivitamin (THERAGRAN) TABS, Take 1 tablet by mouth, Disp: , Rfl:     pyridoxine (VITAMIN B6) 100 mg tablet, Take by mouth, Disp: , Rfl:     spironolactone (ALDACTONE) 50 mg tablet, Take 50 mg by mouth daily, Disp: , Rfl:   Allergies   Allergen Reactions    Metoclopramide Myalgia     Reaction Date: 03Jun2011; Annotation - [id number]: hand cramps and biting of cheeks       Review of Systems:  Review of Systems   Constitutional: Positive for fatigue (stable; still doing office work, working full time)  Negative for appetite change and unexpected weight change  HENT: Negative  Eyes: Negative  Negative for visual disturbance  Wears glasses   Respiratory: Negative  Negative for shortness of breath  Cardiovascular: Negative  Negative for chest pain  Gastrointestinal: Negative  Endocrine: Positive for heat intolerance (can't tolerate humidity)  Genitourinary: Negative  Negative for difficulty urinating, vaginal bleeding and vaginal discharge     Musculoskeletal: Positive for gait problem (mild limp due to scoliosis, more pronounced when she's tired)  Mild scoliosis, occasional right hip discomfort  Intermittent ache/stiffness of joints  Skin: Negative  Negative for color change, rash and wound  Allergic/Immunologic: Negative  Neurological: Negative for dizziness, light-headedness and numbness  Hematological: Bruises/bleeds easily  Psychiatric/Behavioral: Negative  Negative for dysphoric mood and sleep disturbance  Vitals:    06/18/21 0902   BP: 120/72   Pulse: 73   Resp: 18   Temp: (!) 97 1 °F (36 2 °C)   TempSrc: Temporal   SpO2: 98%   Weight: 59 7 kg (131 lb 9 8 oz)   Height: 5' 2" (1 575 m)        Pain Score:   3        Imaging:Mammo diagnostic bilateral w 3d & cad    Result Date: 6/14/2021  Narrative: DIAGNOSIS: Malignant neoplasm of upper-outer quadrant of left breast in female, estrogen receptor positive (HCC) TECHNIQUE: Digital screening mammography was performed  Computer Aided Detection (CAD) analyzed all applicable images  COMPARISONS: Prior breast imaging dated: 06/11/2020, 05/21/2019, 05/10/2018, 05/04/2017, 05/10/2016, 05/14/2015, 11/24/2014, 06/11/2014, 04/23/2014, 04/21/2014, 04/19/2013, 10/08/2012, 04/27/2012, and 04/19/2012 RELEVANT HISTORY: Family Breast Cancer History: History of breast cancer in Mother  Family Medical History: Family medical history includes breast cancer in mother and colon cancer in maternal uncle  Personal History: Hormone history includes estrogen replacement therapy and other  Surgical history includes breast biopsy, lumpectomy, and hysterectomy  Medical history includes breast cancer, ovarian cancer, BRCA 1 negative, and BRCA 2 negative  RISK ASSESSMENT: 5 Year Tyrer-Cuzick: 4 6 % 10 Year Tyrer-Cuzick: 9 49 % Lifetime Tyrer-Cuzick: 9 49 % TISSUE DENSITY: The breasts are heterogeneously dense, which may obscure small masses  INDICATION: Melvi Vail is a 66 y o  female presenting for yearly    Annual mammogram, history of left breast cancer status post lumpectomy and radiation therapy  FINDINGS: Bilateral There are no suspicious masses, grouped microcalcifications or areas of architectural distortion  The skin and nipple areolar complex are unremarkable  There are stable postsurgical changes in the upper-outer quadrant of the left breast   There are round calcifications bilaterally which appear stable  There are vascular calcifications  Impression:  Findings are benign  No mammographic evidence of malignancy  ASSESSMENT/BI-RADS CATEGORY: Left: 2 - Benign Right: 2 - Benign Overall: 2 - Benign RECOMMENDATION:      - Diagnostic mammogram in 1 year for both breasts   Workstation ID: [id number]      Teaching

## 2021-06-23 ENCOUNTER — OFFICE VISIT (OUTPATIENT)
Dept: SURGICAL ONCOLOGY | Facility: CLINIC | Age: 76
End: 2021-06-23
Payer: COMMERCIAL

## 2021-06-23 VITALS
HEART RATE: 72 BPM | SYSTOLIC BLOOD PRESSURE: 120 MMHG | HEIGHT: 62 IN | DIASTOLIC BLOOD PRESSURE: 76 MMHG | BODY MASS INDEX: 24.11 KG/M2 | WEIGHT: 131 LBS | TEMPERATURE: 97.9 F

## 2021-06-23 DIAGNOSIS — C50.412 MALIGNANT NEOPLASM OF UPPER-OUTER QUADRANT OF LEFT BREAST IN FEMALE, ESTROGEN RECEPTOR POSITIVE (HCC): Primary | ICD-10-CM

## 2021-06-23 DIAGNOSIS — Z17.0 MALIGNANT NEOPLASM OF UPPER-OUTER QUADRANT OF LEFT BREAST IN FEMALE, ESTROGEN RECEPTOR POSITIVE (HCC): Primary | ICD-10-CM

## 2021-06-23 DIAGNOSIS — Z13.71 BRCA NEGATIVE: ICD-10-CM

## 2021-06-23 DIAGNOSIS — R92.2 DENSE BREAST TISSUE: ICD-10-CM

## 2021-06-23 DIAGNOSIS — Z79.811 USE OF ANASTROZOLE: ICD-10-CM

## 2021-06-23 PROCEDURE — 99214 OFFICE O/P EST MOD 30 MIN: CPT | Performed by: SURGERY

## 2021-06-23 NOTE — PROGRESS NOTES
Surgical Oncology Follow Up       [address] [facility] SURGICAL ONCOLOGY [facility] [address]    Keegan Zuluaga  1945  [id number]  [facility] SURGICAL ONCOLOGY [place]  [place] [id number]    Chief Complaint   Patient presents with    Follow-up       Assessment/Plan   Diagnoses and all orders for this visit:    Malignant neoplasm of upper-outer quadrant of left breast in female, estrogen receptor positive ([address] )    Dense breast tissue    BRCA negative    Use of anastrozole        Advance Care Planning/Advance Directives:  Discussed disease status, cancer treatment plans and/or cancer treatment goals with the patient  Oncology History:    Oncology History   Malignant neoplasm of upper-outer quadrant of left breast in female, estrogen receptor positive ([facility] [address] )   5/30/2019 Initial Diagnosis    Breast cancer ([facility] [address] )     5/30/2019 Biopsy    Left breast, ultrasound-guided biopsy, 200 8cmfn 4 passes 12g Marquee:  - Invasive mammary carcinoma of no special type (ductal, not otherwise specified)    - grade 2  - %  - %  - HER2 by FISH negative     6/2019 Genetic Testing    BRCA negative     7/8/2019 -  Cancer Staged    Staging form: Breast, AJCC 8th Edition  - Clinical: Stage IB (cT2, cN0, cM0, G2, ER+, NV+, HER2-) - Signed by Osbaldo Cazares MD on 7/8/2019  Laterality: Left  Method of lymph node assessment: Clinical  Histologic grading system: 3 grade system       7/26/2019 Surgery    Left breast lumpectomy with SLN biopsy:  - Invasive mammary carcinoma, 2 7 cm, grade 2  - approximately 80% of tumor is DCIS  - LN's - one lymph node with metastatic carcinoma, 2 1mm deposit, no extranodal extention  - + LVI  - margins negative     8/12/2019 -  Cancer Staged    Staging form: Breast, AJCC 8th Edition  - Pathologic: Stage IB (pT2, pN1(sn), cM0, G2, ER+, NV+, HER2-) - Signed by Osbaldo Cazares MD on 8/12/2019  Neoadjuvant therapy: No  Laterality: Left  Method of lymph node assessment: [place] lymph node biopsy  Histologic grading system: 3 grade system       9/25/2019 - 10/23/2019 Radiation    Plan ID Energy Fractions Dose per Fraction (cGy) Dose Correction (cGy) Total Dose Delivered (cGy) Elapsed Days   BH L Breast 6X 16 / 16 266 0 4,256 21   L Brst Boost 12E 5 / 5 200 0 1,000 6     Dr Boo Bhatt     9/2019 -  Hormone Therapy    Anastrozole 1 mg daily      Ovarian cancer ([facility] [address] )   2005 Initial Diagnosis    Ovarian cancer [facility])     2005 Surgery    MARCO/BSO    Dr Abida Gudino     2005 -  Chemotherapy    Dr Abida Gudino         History of Present Illness:  Breast cancer follow-up, continues on anastrozole with no concerns, no breast referable changes  -Interval History: recent mammogram    Review of Systems:  Review of Systems   Constitutional: Negative  Negative for appetite change and fever  Eyes: Negative  Respiratory: Negative for shortness of breath  Cardiovascular: Negative  Gastrointestinal: Negative  Endocrine: Negative  Genitourinary: Negative  Musculoskeletal: Negative  Negative for arthralgias and myalgias  Skin: Negative  Allergic/Immunologic: Negative  Neurological: Negative  Hematological: Negative  Negative for adenopathy  Does not bruise/bleed easily  Psychiatric/Behavioral: Negative          Patient Active Problem List   Diagnosis    Malignant neoplasm of upper-outer quadrant of left breast in female, estrogen receptor positive ([facility] [address] )    BRCA negative    Ovarian cancer ([facility] [address] )    Use of anastrozole    Osteopenia of left hip    Encounter for gynecological examination (general) (routine) without abnormal findings    Dense breast tissue     Past Medical History:   Diagnosis Date    Basal cell carcinoma     Bulbar polio     Cataract     Colon polyp     Curvature of spine     Exercise involving walking     "at work alot"    History of anemia as a child     "after polio"  History of radiation therapy     left breast    Lymphedema     left leg/"after abdominal lymph nodes removed"/wears thigh high compresion stocking"    Macular degeneration     "and slight glaucoma per eye doctor both eyes"    Muscle weakness     "from polio, predominantly right side"    Osteopenia     Osteoporosis     Ovarian cancer ([address] )     radical hysterectomy w/ chemo    Scoliosis     with right hip pain occas    Swallowing difficulty     "at times since polio"    Thyroid nodule     Wears glasses      Past Surgical History:   Procedure Laterality Date    ABDOMINAL HYSTERECTOMY N/A     ovarian cancer    BOWEL RESECTION      BREAST BIOPSY Left 2015    BREAST BIOPSY Right 2014    BREAST BIOPSY Left     [facility]OPSY Left 2019    [facility]    BREAST LUMPECTOMY Left 2019    Procedure: LUMPECTOMY BREAST NEEDLE LOC at 1100;  Surgeon: Keren Crawley MD;  [facility];  Service: Surgical Oncology    CATARACT EXTRACTION Right     CATARACT EXTRACTION Left 2021     SECTION      COLONOSCOPY      HYSTERECTOMY      age 56    LYMPH NODE BIOPSY Left 2019    Procedure: SENTINEL NODE BIOPSY- NUC MED INJ at 1200;  Surgeon: Keren Crawley MD;  [facility];  Service: Surgical Oncology    LYMPHADENECTOMY      MAMMO NEEDLE LOCALIZATION LEFT (ALL INC) Left 2019    SKIN CANCER EXCISION      US GUIDED BREAST BIOPSY LEFT COMPLETE Left 2019     Family History   Problem Relation Age of Onset    Heart failure Mother     Breast cancer Mother 78    Hypertension Father     Stroke Father     No Known Problems Brother     No Known Problems Brother     No Known Problems Daughter     No Known Problems Maternal Grandmother     Cancer Maternal Grandfather 61        chest and neck    No Known Problems Paternal Grandmother     Cancer Paternal Grandfather         unknown type and age   Dominic Dhaliwal No Known Problems Daughter     Stomach cancer Maternal Aunt 46    Colon cancer Maternal Uncle 71    Lung cancer Maternal Aunt 48    No Known Problems Paternal Aunt     No Known Problems Paternal Aunt     No Known Problems Paternal Aunt      Social History     Socioeconomic History    Marital status: /Civil Union     Spouse name: Not on file    Number of children: Not on file    Years of education: Not on file    Highest education level: Not on file   Occupational History    Not on file   Tobacco Use    Smoking status: Former Smoker     Packs/day: 0 50     Years: 45 00     Pack years: 22 50     Quit date:      Years since quittin 4    Smokeless tobacco: Never Used    Tobacco comment: Quit   Vaping Use    Vaping Use: Never assessed   Substance and Sexual Activity    Alcohol use: Yes     Alcohol/week: 1 0 standard drinks     Types: 1 Glasses of wine per week     Comment: occasional    Drug use: No    Sexual activity: Not Currently     Partners: Male     Birth control/protection: Post-menopausal, Surgical     Comment:    Other Topics Concern    Not on file   Social History Narrative    Not on file     Social Determinants of Health     Financial Resource Strain:     Difficulty of Paying Living Expenses:    Food Insecurity:     Worried About Running Out of Food in the Last Year:     Ran Out of Food in the Last Year:    Transportation Needs:     Lack of Transportation (Medical):      Lack of Transportation (Non-Medical):    Physical Activity:     Days of Exercise per Week:     Minutes of Exercise per Session:    Stress:     Feeling of Stress :    Social Connections:     Frequency of Communication with Friends and Family:     Frequency of Social Gatherings with Friends and Family:     Attends Adventist Services:     Active Member of Clubs or Organizations:     Attends Club or Organization Meetings:     Marital Status:    Intimate Partner Violence:     Fear of Current or Ex-Partner:     Emotionally Abused:     Physically Abused:     Sexually Abused:        Current Outpatient Medications:     anastrozole (ARIMIDEX) 1 mg tablet, Take 1 tablet (1 mg total) by mouth daily, Disp: 90 tablet, Rfl: 3    aspirin 81 MG tablet, Take by mouth, Disp: , Rfl:     Calcium Citrate-Vitamin D (CALCIUM CITRATE + D) 250-200 MG-UNIT TABS, Take by mouth, Disp: , Rfl:     Denosumab (PROLIA SC), Inject under the skin every 6 (six) months, Disp: , Rfl:     HM VITAMIN D3 2000 units CAPS, Take by mouth, Disp: , Rfl:     multivitamin (THERAGRAN) TABS, Take 1 tablet by mouth, Disp: , Rfl:     pyridoxine (VITAMIN B6) 100 mg tablet, Take by mouth, Disp: , Rfl:     spironolactone (ALDACTONE) 50 mg tablet, Take 50 mg by mouth daily, Disp: , Rfl:   Allergies   Allergen Reactions    Metoclopramide Myalgia     Reaction Date: 03Jun2011; Annotation - [id number]: hand cramps and biting of cheeks       The following portions of the patient's history were reviewed and updated as appropriate: allergies, current medications, past family history, past medical history, past social history, past surgical history and problem list         Vitals:    06/23/21 0950   BP: 120/76   Pulse: 72   Temp: 97 9 °F (36 6 °C)       Physical Exam  Constitutional:       General: She is not in acute distress  Appearance: Normal appearance  She is well-developed  HENT:      Head: Normocephalic and atraumatic  Cardiovascular:      Heart sounds: Normal heart sounds  Pulmonary:      Breath sounds: Normal breath sounds  Chest:      Breasts:         Right: No inverted nipple, mass, nipple discharge, skin change or tenderness  Left: Skin change (  Lumpectomy scar) present  No inverted nipple, mass, nipple discharge or tenderness  Abdominal:      Palpations: Abdomen is soft  Lymphadenopathy:      Upper Body:      Right upper body: No supraclavicular, axillary or pectoral adenopathy  Left upper body: No supraclavicular, axillary or pectoral adenopathy  Neurological:      Mental Status: She is alert and oriented to person, place, and time  Psychiatric:         Mood and Affect: Mood normal            Results:  Labs:      Imaging   06/14/2021 bilateral 3D diagnostic mammogram is benign BI-RADS two with a density of three    I reviewed the above imaging data  Discussion/Summary: 53-year-old female status post left breast conservation for an invasive ductal carcinoma  She had radiation therapy and continues on anastrozole  She also has a prior history of ovarian cancer  Her genetic testing was negative  There is no evidence of disease based on examination today  Her recent mammogram was also benign  She continues to have dense breast tissue  I will plan to see her again in six months or sooner should the need arise

## 2021-07-13 ENCOUNTER — OFFICE VISIT (OUTPATIENT)
Dept: HEMATOLOGY ONCOLOGY | Facility: CLINIC | Age: 76
End: 2021-07-13
Payer: COMMERCIAL

## 2021-07-13 ENCOUNTER — HOSPITAL ENCOUNTER (OUTPATIENT)
Dept: INFUSION CENTER | Facility: CLINIC | Age: 76
Discharge: HOME | End: 2021-07-13
Payer: COMMERCIAL

## 2021-07-13 VITALS
RESPIRATION RATE: 16 BRPM | SYSTOLIC BLOOD PRESSURE: 110 MMHG | WEIGHT: 143 LBS | TEMPERATURE: 97.8 F | HEART RATE: 80 BPM | HEIGHT: 62 IN | OXYGEN SATURATION: 98 % | DIASTOLIC BLOOD PRESSURE: 60 MMHG | BODY MASS INDEX: 26.31 KG/M2

## 2021-07-13 VITALS
SYSTOLIC BLOOD PRESSURE: 110 MMHG | RESPIRATION RATE: 16 BRPM | HEART RATE: 80 BPM | DIASTOLIC BLOOD PRESSURE: 60 MMHG | WEIGHT: 142.86 LBS | BODY MASS INDEX: 26.13 KG/M2 | TEMPERATURE: 97.8 F

## 2021-07-13 DIAGNOSIS — C50.412 MALIGNANT NEOPLASM OF UPPER-OUTER QUADRANT OF LEFT BREAST IN FEMALE, ESTROGEN RECEPTOR POSITIVE (HCC): ICD-10-CM

## 2021-07-13 DIAGNOSIS — Z79.811 LONG TERM (CURRENT) USE OF AROMATASE INHIBITORS: ICD-10-CM

## 2021-07-13 DIAGNOSIS — Z17.0 MALIGNANT NEOPLASM OF UPPER-OUTER QUADRANT OF LEFT BREAST IN FEMALE, ESTROGEN RECEPTOR POSITIVE (HCC): Primary | ICD-10-CM

## 2021-07-13 DIAGNOSIS — M85.852 OSTEOPENIA OF LEFT HIP: Primary | ICD-10-CM

## 2021-07-13 DIAGNOSIS — Z17.0 MALIGNANT NEOPLASM OF UPPER-OUTER QUADRANT OF LEFT BREAST IN FEMALE, ESTROGEN RECEPTOR POSITIVE (HCC): ICD-10-CM

## 2021-07-13 DIAGNOSIS — C50.412 MALIGNANT NEOPLASM OF UPPER-OUTER QUADRANT OF LEFT BREAST IN FEMALE, ESTROGEN RECEPTOR POSITIVE (HCC): Primary | ICD-10-CM

## 2021-07-13 DIAGNOSIS — M85.852 OSTEOPENIA OF LEFT HIP: ICD-10-CM

## 2021-07-13 PROCEDURE — 96401 CHEMO ANTI-NEOPL SQ/IM: CPT

## 2021-07-13 PROCEDURE — 99214 OFFICE O/P EST MOD 30 MIN: CPT | Performed by: PHYSICIAN ASSISTANT

## 2021-07-13 RX ADMIN — DENOSUMAB 60 MG: 60 INJECTION SUBCUTANEOUS at 13:58

## 2021-07-13 NOTE — PROGRESS NOTES
Pt tolerated Prolia injection today without any issues  Pt within parameters to treat, used labs from 5/11/21, as per notes from [place], [place] recorded today  Pt provided with AVS and aware of next appointment

## 2021-07-13 NOTE — PROGRESS NOTES
Hematology/Oncology Outpatient Follow-up  Rodger Sainz 66 y o  female 1945 [id number]    Date:  7/13/2021      Assessment and Plan:  1  Malignant neoplasm of upper-outer quadrant of left breast in female, estrogen receptor positive [facility])  42-year-old female presents for follow-up regarding history of breast cancer of left breast, hormone receptor positive, on adjuvant anastrozole  She is taking calcium and vitamin-D as well  She just recently saw Surgical Oncology and had imaging as well as gyn therefore she deferred breast exam today  She has no concerns  She has mild hot flashes during the day when it is hot out during the summer but of the not are not limiting  She does not need refills at this time  Follow-up 6 months     - CBC and differential; Future  - Comprehensive metabolic panel; Future  - Cancer antigen 27 29; Future    2  Osteopenia of left hip, 3  Long term (current) use of aromatase inhibitors  Patient is due for Prolia today  She states she had CMP last week at [email or web address], maybe infusion center already called for this? If not it is OK to proceed with labs from May 2021 as well  DEXA due in Oct  This is due on 10/1/21 or later     - DXA bone density spine hip and pelvis; Future    HPI:  Oncology History   Malignant neoplasm of upper-outer quadrant of left breast in female, estrogen receptor positive ([address] )   5/30/2019 Initial Diagnosis    Breast cancer ([facility] [address] )     5/30/2019 Biopsy    Left breast, ultrasound-guided biopsy, 200 8cmfn 4 passes 12g Marquee:  - Invasive mammary carcinoma of no special type (ductal, not otherwise specified)    - grade 2  - %  - %  - HER2 by FISH negative     6/2019 Genetic Testing    BRCA negative     7/8/2019 -  Cancer Staged    Staging form: Breast, AJCC 8th Edition  - Clinical: Stage IB (cT2, cN0, cM0, G2, ER+, MI+, HER2-) - Signed by Darnell Mosher MD on 7/8/2019  Laterality: Left  Method of lymph node assessment: Clinical  Histologic grading system: 3 grade system       7/26/2019 Surgery    Left breast lumpectomy with SLN biopsy:  - Invasive mammary carcinoma, 2 7 cm, grade 2  - approximately 80% of tumor is DCIS  - LN's - one lymph node with metastatic carcinoma, 2 1mm deposit, no extranodal extention  - + LVI  - margins negative     8/12/2019 -  Cancer Staged    Staging form: Breast, AJCC 8th Edition  - Pathologic: Stage IB (pT2, pN1(sn), cM0, G2, ER+, WV+, HER2-) - Signed by Candy Cotton MD on 8/12/2019  Neoadjuvant therapy: No  Laterality: Left  Method of lymph node assessment: [place] lymph node biopsy  Histologic grading system: 3 grade system       9/25/2019 - 10/23/2019 Radiation    Plan ID Energy Fractions Dose per Fraction (cGy) Dose Correction (cGy) Total Dose Delivered (cGy) Elapsed Days   BH L Breast 6X 16 / 16 266 0 4,256 21   L Brst Boost 12E 5 / 5 200 0 1,000 6     Dr Rose Burt     9/2019 -  Hormone Therapy    Anastrozole 1 mg daily      Ovarian cancer ([facility] [address] )   2005 Initial Diagnosis    Ovarian cancer [facility])     2005 Surgery    MARCO/BSO    Dr Sedrick Mathews     2005 -  Chemotherapy    Dr Sedrick Mathews       47-year-old female (ECOG 0) with history of invasive mammary carcinoma who is status post lumpectomy and sentinel lymph node sampling  Patient's tumor measured 2 7 cm, grade 2 of 3   Approximately 80% of tumor is DCIS       3 sentinel lymph nodes were removed   1 had focus of metastatic carcinoma measuring 2 1 mm; however negative for extranodal extension      Patient's anatomic stage is at least stage IIB, T2, N1 a, C M0, G2   %, %, her 2-by FISH     She had postoperative complications with hematoma formation for which she has been following Surgical Oncology  Peter Franco states that she continues to apply warm compresses to the area and it is improving   She has follow-up with Surgical Oncology for routine visit in November 2019       Following surgery patient was noted to have very slight invasion into 1 lymph node, stage IIB   She was offered chemotherapy  Yobani Caldwell was to proceed with aromatase inhibitor alone      She is on anastrozole 1 mg       Patient has had genetic testing in the past due to history of ovarian cancer and is negative  Interval history: no new complaints  She continues to work daily  She is UTD with surg onc and GYN visit and mammo    ROS: Review of Systems   Constitutional: Negative for appetite change, chills, fatigue, fever and unexpected weight change  Respiratory: Negative for cough and shortness of breath  Cardiovascular: Negative for chest pain, palpitations and leg swelling  Gastrointestinal: Negative for abdominal pain, blood in stool, constipation, diarrhea, nausea and vomiting  Genitourinary: Negative for difficulty urinating, dysuria and hematuria  Musculoskeletal: Negative for arthralgias  Skin: Negative  Neurological: Negative for dizziness, weakness, light-headedness, numbness and headaches  Hematological: Negative  Psychiatric/Behavioral: Negative          Past Medical History:   Diagnosis Date    Basal cell carcinoma     Bulbar polio     Cataract     Colon polyp     Curvature of spine     Exercise involving walking     "at work alot"    History of anemia as a child     "after polio"    History of radiation therapy 2019    left breast    Lymphedema     left leg/"after abdominal lymph nodes removed"/wears thigh high compresion stocking"    Macular degeneration     "and slight glaucoma per eye doctor both eyes"    Muscle weakness     "from polio, predominantly right side"    Osteopenia     Osteoporosis     Ovarian cancer ([address] ) 2005    radical hysterectomy w/ chemo    Scoliosis     with right hip pain occas    Swallowing difficulty     "at times since polio"    Thyroid nodule     Wears glasses        Past Surgical History:   Procedure Laterality Date    ABDOMINAL HYSTERECTOMY N/A     ovarian cancer    BOWEL RESECTION      BREAST BIOPSY Left 01/12/2015    BREAST BIOPSY Right 2014    BREAST BIOPSY Left     BREAST BIOPSY Left 2019    [facility]    BREAST LUMPECTOMY Left 2019    Procedure: LUMPECTOMY BREAST NEEDLE LOC at 1100;  Surgeon: Patricia Rubi MD;  Location: [facility];  Service: Surgical Oncology    CATARACT EXTRACTION Right     CATARACT EXTRACTION Left 2021     SECTION      COLONOSCOPY      HYSTERECTOMY      age 56    LYMPH NODE BIOPSY Left 2019    Procedure: SENTINEL NODE BIOPSY- NUC MED INJ at 1200;  Surgeon: Patricia Rubi MD;  Location: [facility];  Service: Surgical Oncology    LYMPHADENECTOMY      MAMMO NEEDLE LOCALIZATION LEFT (ALL INC) Left 2019    SKIN CANCER EXCISION      US GUIDED BREAST BIOPSY LEFT COMPLETE Left 2019       Social History     Socioeconomic History    Marital status: /Civil Union     Spouse name: None    Number of children: None    Years of education: None    Highest education level: None   Occupational History    None   Tobacco Use    Smoking status: Former Smoker     Packs/day: 0 50     Years: 45 00     Pack years: 22 50     Quit date:      Years since quittin 5    Smokeless tobacco: Never Used    Tobacco comment: Quit   Vaping Use    Vaping Use: Never assessed   Substance and Sexual Activity    Alcohol use: Yes     Alcohol/week: 1 0 standard drinks     Types: 1 Glasses of wine per week     Comment: occasional    Drug use: No    Sexual activity: Not Currently     Partners: Male     Birth control/protection: Post-menopausal, Surgical     Comment:    Other Topics Concern    None   Social History Narrative    None     Social Determinants of Health     Financial Resource Strain:     Difficulty of Paying Living Expenses:    Food Insecurity:     Worried About Running Out of Food in the Last Year:     Ran Out of Food in the Last Year:    Transportation Needs:     Lack of Transportation (Medical):      Lack of Transportation (Non-Medical):    Physical Activity:     Days of Exercise per Week:     Minutes of Exercise per Session:    Stress:     Feeling of Stress :    Social Connections:     Frequency of Communication with Friends and Family:     Frequency of Social Gatherings with Friends and Family:     Attends Temple Services:     Active Member of Clubs or Organizations:     Attends Club or Organization Meetings:     Marital Status:    Intimate Partner Violence:     Fear of Current or Ex-Partner:     Emotionally Abused:     Physically Abused:     Sexually Abused:        Family History   Problem Relation Age of Onset    Heart failure Mother     Breast cancer Mother 78    Hypertension Father     Stroke Father     No Known Problems Brother     No Known Problems Brother     No Known Problems Daughter     No Known Problems Maternal Grandmother     Cancer Maternal Grandfather 61        chest and neck    No Known Problems Paternal Grandmother     Cancer Paternal Grandfather         unknown type and age   Maame Carrero No Known Problems Daughter     Stomach cancer Maternal Aunt 46    Colon cancer Maternal Uncle 71    Lung cancer Maternal Aunt 48    No Known Problems Paternal Aunt     No Known Problems Paternal Aunt     No Known Problems Paternal Aunt        Allergies   Allergen Reactions    Metoclopramide Myalgia     Reaction Date: 03Jun2011;  Annotation - [id number]: hand cramps and biting of cheeks         Current Outpatient Medications:     anastrozole (ARIMIDEX) 1 mg tablet, Take 1 tablet (1 mg total) by mouth daily, Disp: 90 tablet, Rfl: 3    aspirin 81 MG tablet, Take by mouth, Disp: , Rfl:     Calcium Citrate-Vitamin D (CALCIUM CITRATE + D) 250-200 MG-UNIT TABS, Take by mouth, Disp: , Rfl:     Denosumab (PROLIA SC), Inject under the skin every 6 (six) months, Disp: , Rfl:     HM VITAMIN D3 2000 units CAPS, Take by mouth, Disp: , Rfl:     multivitamin (THERAGRAN) TABS, Take 1 tablet by mouth, Disp: , Rfl:     pyridoxine (VITAMIN B6) 100 mg tablet, Take by mouth, Disp: , Rfl:     spironolactone (ALDACTONE) 50 mg tablet, Take 50 mg by mouth daily, Disp: , Rfl:       Physical Exam:  /60 (BP Location: Left arm, Patient Position: Sitting, Cuff Size: Adult)   Pulse 80   Temp 97 8 °F (36 6 °C) (Temporal)   Resp 16   Ht 5' 2" (1 575 m)   Wt 64 9 kg (143 lb)   SpO2 98%   BMI 26 16 kg/m²     Physical Exam  Vitals reviewed  Constitutional:       General: She is not in acute distress  Appearance: She is well-developed  HENT:      Head: Normocephalic and atraumatic  Eyes:      General: No scleral icterus  Conjunctiva/sclera: Conjunctivae normal    Cardiovascular:      Rate and Rhythm: Normal rate and regular rhythm  Heart sounds: Normal heart sounds  No murmur heard  Pulmonary:      Effort: Pulmonary effort is normal  No respiratory distress  Breath sounds: Normal breath sounds  Abdominal:      Palpations: Abdomen is soft  Tenderness: There is no abdominal tenderness  Musculoskeletal:         General: No tenderness  Normal range of motion  Cervical back: Normal range of motion and neck supple  Right lower leg: No edema  Left lower leg: No edema  Lymphadenopathy:      Cervical: No cervical adenopathy  Skin:     General: Skin is warm and dry  Neurological:      Mental Status: She is alert and oriented to person, place, and time  Cranial Nerves: No cranial nerve deficit     Psychiatric:         Mood and Affect: Mood normal          Behavior: Behavior normal        Labs:  Lab Results   Component Value Date    WBC 6 50 10/07/2019    HGB 13 4 10/07/2019    HCT 40 8 10/07/2019    MCV 97 10/07/2019     10/07/2019     Lab Results   Component Value Date    K 4 6 07/13/2020    CL 99 07/13/2020    CO2 34 (H) 07/13/2020    BUN 25 07/13/2020    CREATININE 1 10 07/13/2020    CALCIUM 10 1 07/13/2020    AST 41 07/13/2020    ALT 32 07/13/2020    ALKPHOS 56 07/13/2020    EGFR 49 07/13/2020 Patient voiced understanding and agreement in the above discussion  Aware to contact our office with questions/symptoms in the interim  This note has been generated by voice recognition software system  Therefore, there may be spelling, grammar, and or syntax errors  Please contact if questions arise

## 2021-10-20 DIAGNOSIS — C50.412 MALIGNANT NEOPLASM OF UPPER-OUTER QUADRANT OF LEFT BREAST IN FEMALE, ESTROGEN RECEPTOR POSITIVE (HCC): ICD-10-CM

## 2021-10-20 DIAGNOSIS — M85.852 OSTEOPENIA OF LEFT HIP: Primary | ICD-10-CM

## 2021-10-20 DIAGNOSIS — Z17.0 MALIGNANT NEOPLASM OF UPPER-OUTER QUADRANT OF LEFT BREAST IN FEMALE, ESTROGEN RECEPTOR POSITIVE (HCC): ICD-10-CM

## 2021-11-05 ENCOUNTER — TELEPHONE (OUTPATIENT)
Dept: HEMATOLOGY ONCOLOGY | Facility: CLINIC | Age: 76
End: 2021-11-05

## 2021-11-29 ENCOUNTER — HOSPITAL ENCOUNTER (OUTPATIENT)
Dept: BONE DENSITY | Facility: MEDICAL CENTER | Age: 76
Discharge: HOME | End: 2021-11-29
Payer: COMMERCIAL

## 2021-11-29 DIAGNOSIS — M85.852 OSTEOPENIA OF LEFT HIP: ICD-10-CM

## 2021-11-29 DIAGNOSIS — Z79.811 LONG TERM (CURRENT) USE OF AROMATASE INHIBITORS: ICD-10-CM

## 2021-11-29 PROCEDURE — 77080 DXA BONE DENSITY AXIAL: CPT

## 2021-11-30 ENCOUNTER — TELEPHONE (OUTPATIENT)
Dept: OBGYN CLINIC | Facility: CLINIC | Age: 76
End: 2021-11-30

## 2021-12-07 ENCOUNTER — TELEPHONE (OUTPATIENT)
Dept: HEMATOLOGY ONCOLOGY | Facility: CLINIC | Age: 76
End: 2021-12-07

## 2021-12-17 ENCOUNTER — TELEPHONE (OUTPATIENT)
Dept: HEMATOLOGY ONCOLOGY | Facility: CLINIC | Age: 76
End: 2021-12-17

## 2021-12-20 ENCOUNTER — TELEPHONE (OUTPATIENT)
Dept: SURGICAL ONCOLOGY | Facility: CLINIC | Age: 76
End: 2021-12-20

## 2021-12-20 DIAGNOSIS — Z17.0 MALIGNANT NEOPLASM OF UPPER-OUTER QUADRANT OF LEFT BREAST IN FEMALE, ESTROGEN RECEPTOR POSITIVE (HCC): ICD-10-CM

## 2021-12-20 DIAGNOSIS — C50.412 MALIGNANT NEOPLASM OF UPPER-OUTER QUADRANT OF LEFT BREAST IN FEMALE, ESTROGEN RECEPTOR POSITIVE (HCC): ICD-10-CM

## 2021-12-20 RX ORDER — ANASTROZOLE 1 MG/1
1 TABLET ORAL DAILY
Qty: 90 TABLET | Refills: 3 | Status: SHIPPED | OUTPATIENT
Start: 2021-12-20

## 2021-12-29 ENCOUNTER — OFFICE VISIT (OUTPATIENT)
Dept: SURGICAL ONCOLOGY | Facility: CLINIC | Age: 76
End: 2021-12-29
Payer: COMMERCIAL

## 2021-12-29 VITALS
DIASTOLIC BLOOD PRESSURE: 72 MMHG | WEIGHT: 134 LBS | OXYGEN SATURATION: 99 % | RESPIRATION RATE: 16 BRPM | TEMPERATURE: 97.2 F | HEART RATE: 76 BPM | SYSTOLIC BLOOD PRESSURE: 138 MMHG | BODY MASS INDEX: 23.74 KG/M2 | HEIGHT: 63 IN

## 2021-12-29 DIAGNOSIS — Z17.0 MALIGNANT NEOPLASM OF UPPER-OUTER QUADRANT OF LEFT BREAST IN FEMALE, ESTROGEN RECEPTOR POSITIVE (HCC): Primary | ICD-10-CM

## 2021-12-29 DIAGNOSIS — R92.2 DENSE BREAST TISSUE: ICD-10-CM

## 2021-12-29 DIAGNOSIS — Z79.811 USE OF ANASTROZOLE: ICD-10-CM

## 2021-12-29 DIAGNOSIS — C50.412 MALIGNANT NEOPLASM OF UPPER-OUTER QUADRANT OF LEFT BREAST IN FEMALE, ESTROGEN RECEPTOR POSITIVE (HCC): Primary | ICD-10-CM

## 2021-12-29 PROCEDURE — 99214 OFFICE O/P EST MOD 30 MIN: CPT | Performed by: SURGERY

## 2022-02-03 ENCOUNTER — TELEPHONE (OUTPATIENT)
Dept: SURGICAL ONCOLOGY | Facility: CLINIC | Age: 77
End: 2022-02-03

## 2022-02-03 NOTE — TELEPHONE ENCOUNTER
Received a return call from Benedict Neri  She shared she would like to drop off her [employer] Forms on Wednesday while she has an appt with medical oncology  Discussed that would be fine

## 2022-02-03 NOTE — TELEPHONE ENCOUNTER
Received a message from Olimpia Rader regarding [employer] Forms she would like completed  She requested a call from the office  Attempted calling her to discuss, no answer, left her a detailed message she would need to obtain the necessary forms and provide them to the office

## 2022-02-07 DIAGNOSIS — C50.412 MALIGNANT NEOPLASM OF UPPER-OUTER QUADRANT OF LEFT BREAST IN FEMALE, ESTROGEN RECEPTOR POSITIVE (HCC): ICD-10-CM

## 2022-02-07 DIAGNOSIS — M85.852 OSTEOPENIA OF LEFT HIP: Primary | ICD-10-CM

## 2022-02-07 DIAGNOSIS — Z17.0 MALIGNANT NEOPLASM OF UPPER-OUTER QUADRANT OF LEFT BREAST IN FEMALE, ESTROGEN RECEPTOR POSITIVE (HCC): ICD-10-CM

## 2022-02-09 ENCOUNTER — HOSPITAL ENCOUNTER (OUTPATIENT)
Dept: INFUSION CENTER | Facility: CLINIC | Age: 77
Discharge: HOME | End: 2022-02-09
Payer: COMMERCIAL

## 2022-02-09 VITALS
DIASTOLIC BLOOD PRESSURE: 69 MMHG | TEMPERATURE: 98.2 F | RESPIRATION RATE: 16 BRPM | SYSTOLIC BLOOD PRESSURE: 137 MMHG | HEART RATE: 88 BPM

## 2022-02-09 DIAGNOSIS — Z17.0 MALIGNANT NEOPLASM OF UPPER-OUTER QUADRANT OF LEFT BREAST IN FEMALE, ESTROGEN RECEPTOR POSITIVE (HCC): ICD-10-CM

## 2022-02-09 DIAGNOSIS — M85.852 OSTEOPENIA OF LEFT HIP: Primary | ICD-10-CM

## 2022-02-09 DIAGNOSIS — C50.412 MALIGNANT NEOPLASM OF UPPER-OUTER QUADRANT OF LEFT BREAST IN FEMALE, ESTROGEN RECEPTOR POSITIVE (HCC): ICD-10-CM

## 2022-02-09 PROCEDURE — 96372 THER/PROPH/DIAG INJ SC/IM: CPT

## 2022-02-09 RX ADMIN — DENOSUMAB 60 MG: 60 INJECTION SUBCUTANEOUS at 14:36

## 2022-02-09 NOTE — PROGRESS NOTES
Pt here for prolia injection  Ca 9 2, creatinine clearance 50, pt denies any recent or upcoming dental work  Pt tolerated injection without complication   Declined AVS  States she will make her next appt after seeing Rishi Clifford PA-C

## 2022-02-15 ENCOUNTER — OFFICE VISIT (OUTPATIENT)
Dept: HEMATOLOGY ONCOLOGY | Facility: CLINIC | Age: 77
End: 2022-02-15
Payer: COMMERCIAL

## 2022-02-15 VITALS
BODY MASS INDEX: 24.55 KG/M2 | HEART RATE: 91 BPM | WEIGHT: 133.4 LBS | TEMPERATURE: 97.1 F | RESPIRATION RATE: 18 BRPM | DIASTOLIC BLOOD PRESSURE: 64 MMHG | HEIGHT: 62 IN | SYSTOLIC BLOOD PRESSURE: 112 MMHG | OXYGEN SATURATION: 97 %

## 2022-02-15 DIAGNOSIS — C50.412 MALIGNANT NEOPLASM OF UPPER-OUTER QUADRANT OF LEFT BREAST IN FEMALE, ESTROGEN RECEPTOR POSITIVE (HCC): Primary | ICD-10-CM

## 2022-02-15 DIAGNOSIS — Z17.0 MALIGNANT NEOPLASM OF UPPER-OUTER QUADRANT OF LEFT BREAST IN FEMALE, ESTROGEN RECEPTOR POSITIVE (HCC): Primary | ICD-10-CM

## 2022-02-15 PROCEDURE — 99214 OFFICE O/P EST MOD 30 MIN: CPT | Performed by: PHYSICIAN ASSISTANT

## 2022-02-15 NOTE — PROGRESS NOTES
Hematology/Oncology Outpatient Follow-up  Cindy Marvin 64 y o  female 1945 [id number]    Date:  2/15/2022      Assessment and Plan:  1  Malignant neoplasm of upper-outer quadrant of left breast in female, estrogen receptor positive ([facility] [address] )  64year old female presents for follow-up regarding history of breast cancer of left breast, hormone receptor positive, on adjuvant anastrozole  She is taking calcium and vitamin-D   CBC, CMP, tumor marker is normal   She is without symptoms  She does not need anastrozole at this time  Follow up in 6 months       - CBC and differential; Future  - Comprehensive metabolic panel; Future  - Cancer antigen 27 29; Future    2  Osteopenia with current and long term use of aromatase inhibitor   Patient has osteopenia and was started on Prolia due to being on aromatase inhibitor  She has had an improvement in bone density since being on Prolia  She is tolerating well  She is up-to-date with continued dental care every 6 months  HPI:  Oncology History   Malignant neoplasm of upper-outer quadrant of left breast in female, estrogen receptor positive ([facility] )   5/30/2019 Initial Diagnosis    Breast cancer ([facility] )     5/30/2019 Biopsy    Left breast, ultrasound-guided biopsy, 200 8cmfn 4 passes 12g Marquee:  - Invasive mammary carcinoma of no special type (ductal, not otherwise specified)    - grade 2  - %  - %  - HER2 by FISH negative     6/2019 Genetic Testing    BRCA negative     7/8/2019 -  Cancer Staged    Staging form: Breast, AJCC 8th Edition  - Clinical: Stage IB (cT2, cN0, cM0, G2, ER+, TX+, HER2-) - Signed by Negar Chin MD on 7/8/2019  Laterality: Left  Method of lymph node assessment: Clinical  Histologic grading system: 3 grade system       7/26/2019 Surgery    Left breast lumpectomy with SLN biopsy:  - Invasive mammary carcinoma, 2 7 cm, grade 2  - approximately 80% of tumor is DCIS  - LN's - one lymph node with metastatic carcinoma, 2 1mm deposit, no extranodal extention  - + LVI  - margins negative     8/12/2019 -  Cancer Staged    Staging form: Breast, AJCC 8th Edition  - Pathologic: Stage IB (pT2, pN1(sn), cM0, G2, ER+, HI+, HER2-) - Signed by Keren Crawley MD on 8/12/2019  Neoadjuvant therapy: No  Laterality: Left  Method of lymph node assessment: [place] lymph node biopsy  Histologic grading system: 3 grade system       9/25/2019 - 10/23/2019 Radiation    Plan ID Energy Fractions Dose per Fraction (cGy) Dose Correction (cGy) Total Dose Delivered (cGy) Elapsed Days   BH L Breast 6X 16 / 16 266 0 4,256 21   L Brst Boost 12E 5 / 5 200 0 1,000 6     Dr Basilia Tadeo     9/2019 -  Hormone Therapy    Anastrozole 1 mg daily      Ovarian cancer ([facility] [address] )   2005 Initial Diagnosis    Ovarian cancer [facility])     2005 Surgery    MARCO/BSO    Dr Clive Miller     2005 -  Chemotherapy    Dr Hali Davis female (ECOG 0) with history of invasive mammary carcinoma who is status post lumpectomy and sentinel lymph node sampling  Patient's tumor measured 2 7 cm, grade 2 of 3   Approximately 80% of tumor is DCIS       3 sentinel lymph nodes were removed   1 had focus of metastatic carcinoma measuring 2 1 mm; however negative for extranodal extension      Patient's anatomic stage is at least stage IIB, T2, N1 a, C M0, G2   %, %, her 2-by FISH     She had postoperative complications with hematoma formation for which she has been following Surgical Oncology  Patricia Guzman states that she continues to apply warm compresses to the area and it is improving   She has follow-up with Surgical Oncology for routine visit in November 2019       Following surgery patient was noted to have very slight invasion into 1 lymph node, stage IIB   She was offered chemotherapy   He was to proceed with aromatase inhibitor alone      She is on anastrozole 1 mg       Patient has had genetic testing in the past due to history of ovarian cancer and is negative      Interval history: no complaints     ROS: Review of Systems   Constitutional: Negative for appetite change, chills, fatigue, fever and unexpected weight change  Respiratory: Negative for cough and shortness of breath  Cardiovascular: Negative for chest pain, palpitations and leg swelling  Gastrointestinal: Negative for abdominal pain, blood in stool, constipation, diarrhea, nausea and vomiting  Genitourinary: Negative for difficulty urinating, dysuria and hematuria  Musculoskeletal: Negative for arthralgias  Skin: Negative  Neurological: Negative for dizziness, weakness, light-headedness, numbness and headaches  Hematological: Negative  Psychiatric/Behavioral: Negative          Past Medical History:   Diagnosis Date    Basal cell carcinoma     Bulbar polio     Cataract     Colon polyp     Curvature of spine     Exercise involving walking     "at work alot"    History of anemia as a child     "after polio"    History of radiation therapy 2019    left breast    Lymphedema     left leg/"after abdominal lymph nodes removed"/wears thigh high compresion stocking"    Macular degeneration     "and slight glaucoma per eye doctor both eyes"    Muscle weakness     "from polio, predominantly right side"    Osteopenia     Osteoporosis     Ovarian cancer ([facility] [address] ) 2005    radical hysterectomy w/ chemo    Scoliosis     with right hip pain occas    Swallowing difficulty     "at times since polio"    Thyroid nodule     Wears glasses        Past Surgical History:   Procedure Laterality Date    ABDOMINAL HYSTERECTOMY N/A     ovarian cancer    BOWEL RESECTION      BREAST BIOPSY Left 01/12/2015    BREAST BIOPSY Right 06/11/2014    BREAST BIOPSY Left 2007    BREAST BIOPSY Left 05/30/2019    [facility]    BREAST LUMPECTOMY Left 7/26/2019    Procedure: LUMPECTOMY BREAST NEEDLE LOC at 1100;  Surgeon: Philipp Bonilla MD;  Location: [facility];  Service: Surgical Oncology    CATARACT EXTRACTION Right 2008    CATARACT EXTRACTION Left 02/2021     SECTION      COLONOSCOPY      HYSTERECTOMY      age 56    LYMPH NODE BIOPSY Left 2019    Procedure: SENTINEL NODE BIOPSY- NUC MED INJ at 1200;  Surgeon: Osbaldo Cazares MD;  Location: [facility];  Service: Surgical Oncology    LYMPHADENECTOMY      MAMMO NEEDLE LOCALIZATION LEFT (ALL INC) Left 2019    SKIN CANCER EXCISION      US GUIDED BREAST BIOPSY LEFT COMPLETE Left 2019       Social History     Socioeconomic History    Marital status: /Civil Union     Spouse name: Not on file    Number of children: Not on file    Years of education: Not on file    Highest education level: Not on file   Occupational History    Occupation:    Tobacco Use    Smoking status: Former Smoker     Packs/day: 0 50     Years: 45 00     Pack years: 22 50     Quit date:      Years since quittin 1    Smokeless tobacco: Never Used    Tobacco comment: Quit   Vaping Use    Vaping Use: Not on file   Substance and Sexual Activity    Alcohol use:  Yes     Alcohol/week: 1 0 standard drink     Types: 1 Glasses of wine per week     Comment: occasional    Drug use: No    Sexual activity: Not Currently     Partners: Male     Birth control/protection: Post-menopausal, Surgical     Comment:    Other Topics Concern    Not on file   Social History Narrative    Not on file     Social Determinants of Health     Financial Resource Strain: Not on file   Food Insecurity: Not on file   Transportation Needs: Not on file   Physical Activity: Not on file   Stress: Not on file   Social Connections: Not on file   Intimate Partner Violence: Not on file   Housing Stability: Not on file       Family History   Problem Relation Age of Onset    Heart failure Mother     Breast cancer Mother 78    Hypertension Father     Stroke Father     No Known Problems Brother     No Known Problems Brother     No Known Problems Daughter     No Known Problems Maternal Grandmother     Cancer Maternal Grandfather 61        chest and neck    No Known Problems Paternal Grandmother     Cancer Paternal Grandfather         unknown type and age   Rosa Maria Ji No Known Problems Daughter     Stomach cancer Maternal Aunt 46    Colon cancer Maternal Uncle 71    Lung cancer Maternal Aunt 48    No Known Problems Paternal Aunt     No Known Problems Paternal Aunt     No Known Problems Paternal Aunt        Allergies   Allergen Reactions    Metoclopramide Myalgia     Reaction Date: [id number]; Annotation - [id number]: hand cramps and biting of cheeks         Current Outpatient Medications:     anastrozole (ARIMIDEX) 1 mg tablet, Take 1 tablet (1 mg total) by mouth daily, Disp: 90 tablet, Rfl: 3    aspirin 81 MG tablet, Take by mouth, Disp: , Rfl:     Calcium Citrate-Vitamin D (CALCIUM CITRATE + D) 250-200 MG-UNIT TABS, Take by mouth, Disp: , Rfl:     Denosumab (PROLIA SC), Inject under the skin every 6 (six) months, Disp: , Rfl:     HM VITAMIN D3 2000 units CAPS, Take by mouth, Disp: , Rfl:     multivitamin (THERAGRAN) TABS, Take 1 tablet by mouth, Disp: , Rfl:     pyridoxine (VITAMIN B6) 100 mg tablet, Take by mouth, Disp: , Rfl:     Sod Picosulfate-Mag Ox-Cit Acd (Clenpiq) 10-3 5-12 MG-GM -GM/160ML SOLN, Take 1 kit by mouth see administration instructions Follow-up instructions given at office visit, Disp: 160 mL, Rfl: 0    spironolactone (ALDACTONE) 50 mg tablet, Take 50 mg by mouth daily, Disp: , Rfl:       Physical Exam:  /64 (BP Location: Left arm)   Pulse 91   Temp (!) 97 1 °F (36 2 °C) (Tympanic Core)   Resp 18   Ht 5' 2" (1 575 m)   Wt 60 5 kg (133 lb 6 4 oz)   SpO2 97%   BMI 24 40 kg/m²     Physical Exam  Vitals reviewed  Constitutional:       General: She is not in acute distress  Appearance: She is well-developed  She is not ill-appearing  HENT:      Head: Normocephalic and atraumatic  Eyes:      General: No scleral icterus       Conjunctiva/sclera: Conjunctivae normal  Cardiovascular:      Rate and Rhythm: Normal rate and regular rhythm  Heart sounds: Normal heart sounds  No murmur heard  Pulmonary:      Effort: Pulmonary effort is normal  No respiratory distress  Breath sounds: Normal breath sounds  Chest:   Breasts:      Right: No axillary adenopathy or supraclavicular adenopathy  Left: No axillary adenopathy or supraclavicular adenopathy  Comments: Patient deferred breast exam   Abdominal:      Palpations: Abdomen is soft  Tenderness: There is no abdominal tenderness  Musculoskeletal:         General: No tenderness  Normal range of motion  Cervical back: Normal range of motion and neck supple  Right lower leg: No edema  Left lower leg: No edema  Lymphadenopathy:      Cervical: No cervical adenopathy  Upper Body:      Right upper body: No supraclavicular or axillary adenopathy  Left upper body: No supraclavicular or axillary adenopathy  Skin:     General: Skin is warm and dry  Neurological:      Mental Status: She is alert and oriented to person, place, and time  Cranial Nerves: No cranial nerve deficit  Psychiatric:         Mood and Affect: Mood normal          Behavior: Behavior normal            Labs:  Lab Results   Component Value Date    WBC 6 50 10/07/2019    HGB 13 4 10/07/2019    HCT 40 8 10/07/2019    MCV 97 10/07/2019     10/07/2019     Lab Results   Component Value Date    K 4 6 07/13/2020    CL 99 07/13/2020    CO2 34 (H) 07/13/2020    BUN 25 07/13/2020    CREATININE 1 10 07/13/2020    CALCIUM 10 1 07/13/2020    AST 41 07/13/2020    ALT 32 07/13/2020    ALKPHOS 56 07/13/2020    EGFR 49 07/13/2020     Patient voiced understanding and agreement in the above discussion  Aware to contact our office with questions/symptoms in the interim  This note has been generated by voice recognition software system  Therefore, there may be spelling, grammar, and or syntax errors   Please contact if questions arise

## 2022-04-06 DIAGNOSIS — C50.412 MALIGNANT NEOPLASM OF UPPER-OUTER QUADRANT OF LEFT BREAST IN FEMALE, ESTROGEN RECEPTOR POSITIVE (HCC): ICD-10-CM

## 2022-04-06 DIAGNOSIS — M85.852 OSTEOPENIA OF LEFT HIP: Primary | ICD-10-CM

## 2022-04-06 DIAGNOSIS — Z17.0 MALIGNANT NEOPLASM OF UPPER-OUTER QUADRANT OF LEFT BREAST IN FEMALE, ESTROGEN RECEPTOR POSITIVE (HCC): ICD-10-CM

## 2022-04-13 ENCOUNTER — TELEPHONE (OUTPATIENT)
Dept: GYNECOLOGIC ONCOLOGY | Facility: CLINIC | Age: 77
End: 2022-04-13

## 2022-04-13 NOTE — TELEPHONE ENCOUNTER
Called patient and left message to r/s appt on 7/1/22 with Ira WARD to be rescheduled with [facility]  Diagnosis:    Protocol/Chemo Regimen:    Day:     Pt endorsed:    Review of Systems:     Pain scale:     Diet:     Allergies    No Known Allergies    Intolerances        ANTIMICROBIALS  levoFLOXacin  Tablet 250 milliGRAM(s) Oral every 24 hours  tenofovir disoproxil fumarate (VIREAD) 300 milliGRAM(s) Oral <User Schedule>      HEME/ONC MEDICATIONS  hydroxyurea 1000 milliGRAM(s) Oral every 8 hours      STANDING MEDICATIONS  allopurinol 100 milliGRAM(s) Oral daily  amLODIPine   Tablet 5 milliGRAM(s) Oral daily  AQUAPHOR (petrolatum Ointment) 1 Application(s) Topical two times a day  benzonatate 100 milliGRAM(s) Oral three times a day  Biotene Dry Mouth Oral Rinse 5 milliLiter(s) Swish and Spit five times a day  bisoprolol   Tablet 5 milliGRAM(s) Oral daily  calcium acetate 667 milliGRAM(s) Oral four times a day with meals  chlorhexidine 2% Cloths 1 Application(s) Topical <User Schedule>  dextrose 5%. 1000 milliLiter(s) IV Continuous <Continuous>  dextrose 50% Injectable 12.5 Gram(s) IV Push once  dextrose 50% Injectable 25 Gram(s) IV Push once  dextrose 50% Injectable 25 Gram(s) IV Push once  docusate sodium 100 milliGRAM(s) Oral three times a day  finasteride 5 milliGRAM(s) Oral daily  influenza   Vaccine 0.5 milliLiter(s) IntraMuscular once  insulin lispro (HumaLOG) corrective regimen sliding scale   SubCutaneous three times a day before meals  insulin lispro (HumaLOG) corrective regimen sliding scale   SubCutaneous at bedtime  lidocaine 2% Injectable 1 Vial(s) Local Injection once  melatonin 5 milliGRAM(s) Oral at bedtime  nicotine - 21 mG/24Hr(s) Patch 1 patch Transdermal daily  pramoxine 1% Topical Lotion 1 Application(s) Topical two times a day  simvastatin 10 milliGRAM(s) Oral at bedtime  sodium chloride 0.9%. 1000 milliLiter(s) IV Continuous <Continuous>  tamsulosin 0.4 milliGRAM(s) Oral at bedtime  triamcinolone 0.1% Ointment 1 Application(s) Topical two times a day      PRN MEDICATIONS  acetaminophen   Tablet .. 650 milliGRAM(s) Oral every 6 hours PRN  dextrose 40% Gel 15 Gram(s) Oral once PRN  glucagon  Injectable 1 milliGRAM(s) IntraMuscular once PRN  morphine  - Injectable 2 milliGRAM(s) IV Push every 4 hours PRN  zolpidem 5 milliGRAM(s) Oral at bedtime PRN        Vital Signs Last 24 Hrs  T(C): 36.9 (27 Sep 2019 05:23), Max: 36.9 (27 Sep 2019 05:23)  T(F): 98.5 (27 Sep 2019 05:23), Max: 98.5 (27 Sep 2019 05:23)  HR: 78 (27 Sep 2019 05:23) (73 - 78)  BP: 114/54 (27 Sep 2019 05:23) (94/53 - 117/58)  BP(mean): --  RR: 18 (27 Sep 2019 05:23) (16 - 18)  SpO2: 96% (27 Sep 2019 05:23) (95% - 99%)    PHYSICAL EXAM  General: NAD  HEENT: PERRLA, EOMI, clear oropharynx  Neck: supple  CV: (+) S1/S2 RRR  Lungs: clear to auscultation, no wheezes or rales  Abdomen: soft, non-tender, non-distended (+) BS  Ext: no edema  Skin: no rashes   Neuro: alert and oriented X 3, no focal deficits  Central Line:     RECENT CULTURES:  09-25 @ 19:05  .Blood  --  --  --    No growth to date.  --  09-25 @ 16:31  .Urine  --  --  --    No growth  --  09-25 @ 15:45  .Blood  --  --  --    No growth to date.  --  09-23 @ 09:55  .Urine  --  --  --    No growth  --  09-23 @ 01:11  .Blood  --  --  --    No growth to date.  --        LABS:                        7.2    71.7  )-----------( 80       ( 26 Sep 2019 07:01 )             24.6         Mean Cell Volume : 93.2 fl  Mean Cell Hemoglobin : 27.2 pg  Mean Cell Hemoglobin Concentration : 29.2 gm/dL  Auto Neutrophil # : 47.5 K/uL  Auto Lymphocyte # : 2.8 K/uL  Auto Monocyte # : 20.0 K/uL  Auto Eosinophil # : 1.2 K/uL  Auto Basophil # : 0.1 K/uL  Auto Neutrophil % : 57.0 %  Auto Lymphocyte % : 3.0 %  Auto Monocyte % : 23.0 %  Auto Eosinophil % : 3.0 %  Auto Basophil % : 0.0 %      09-26    132<L>  |  98  |  42<H>  ----------------------------<  216<H>  4.6   |  17<L>  |  1.63<H>    Ca    8.8      26 Sep 2019 17:00  Phos  6.2     09-26  Mg     2.1     09-26    TPro  6.5  /  Alb  3.6  /  TBili  0.7  /  DBili  x   /  AST  24  /  ALT  14  /  AlkPhos  141<H>  09-26      Mg 2.1  Phos 6.2      PT/INR - ( 26 Sep 2019 07:01 )   PT: 13.1 sec;   INR: 1.13 ratio         PTT - ( 26 Sep 2019 07:01 )  PTT:32.0 sec    LDH 1920  Uric Acid 4.1        RADIOLOGY & ADDITIONAL STUDIES: Diagnosis:    Protocol/Chemo Regimen:    Day:     Pt endorsed:    Review of Systems:     Pain scale:     Diet:     Allergies    No Known Allergies    Intolerances        ANTIMICROBIALS  levoFLOXacin  Tablet 250 milliGRAM(s) Oral every 24 hours  tenofovir disoproxil fumarate (VIREAD) 300 milliGRAM(s) Oral <User Schedule>      HEME/ONC MEDICATIONS  hydroxyurea 1000 milliGRAM(s) Oral every 8 hours      STANDING MEDICATIONS  allopurinol 100 milliGRAM(s) Oral daily  amLODIPine   Tablet 5 milliGRAM(s) Oral daily  AQUAPHOR (petrolatum Ointment) 1 Application(s) Topical two times a day  benzonatate 100 milliGRAM(s) Oral three times a day  Biotene Dry Mouth Oral Rinse 5 milliLiter(s) Swish and Spit five times a day  bisoprolol   Tablet 5 milliGRAM(s) Oral daily  calcium acetate 667 milliGRAM(s) Oral four times a day with meals  calcium acetate 667 milliGRAM(s) Oral four times a day with meals  chlorhexidine 2% Cloths 1 Application(s) Topical <User Schedule>  dextrose 5%. 1000 milliLiter(s) IV Continuous <Continuous>  dextrose 50% Injectable 12.5 Gram(s) IV Push once  dextrose 50% Injectable 25 Gram(s) IV Push once  dextrose 50% Injectable 25 Gram(s) IV Push once  docusate sodium 100 milliGRAM(s) Oral three times a day  finasteride 5 milliGRAM(s) Oral daily  influenza   Vaccine 0.5 milliLiter(s) IntraMuscular once  insulin lispro (HumaLOG) corrective regimen sliding scale   SubCutaneous three times a day before meals  insulin lispro (HumaLOG) corrective regimen sliding scale   SubCutaneous at bedtime  lidocaine 2% Injectable 1 Vial(s) Local Injection once  melatonin 5 milliGRAM(s) Oral at bedtime  nicotine - 21 mG/24Hr(s) Patch 1 patch Transdermal daily  pramoxine 1% Topical Lotion 1 Application(s) Topical two times a day  simvastatin 10 milliGRAM(s) Oral at bedtime  sodium chloride 0.9%. 1000 milliLiter(s) IV Continuous <Continuous>  tamsulosin 0.4 milliGRAM(s) Oral at bedtime  triamcinolone 0.1% Ointment 1 Application(s) Topical two times a day      PRN MEDICATIONS  acetaminophen   Tablet .. 650 milliGRAM(s) Oral every 6 hours PRN  dextrose 40% Gel 15 Gram(s) Oral once PRN  glucagon  Injectable 1 milliGRAM(s) IntraMuscular once PRN  morphine  - Injectable 2 milliGRAM(s) IV Push every 4 hours PRN  zolpidem 5 milliGRAM(s) Oral at bedtime PRN        Vital Signs Last 24 Hrs  T(C): 36.9 (27 Sep 2019 05:23), Max: 36.9 (27 Sep 2019 05:23)  T(F): 98.5 (27 Sep 2019 05:23), Max: 98.5 (27 Sep 2019 05:23)  HR: 78 (27 Sep 2019 05:23) (73 - 78)  BP: 114/54 (27 Sep 2019 05:23) (94/53 - 117/58)  BP(mean): --  RR: 18 (27 Sep 2019 05:23) (16 - 18)  SpO2: 96% (27 Sep 2019 05:23) (95% - 99%)    PHYSICAL EXAM  General: NAD  HEENT: PERRLA, EOMI, clear oropharynx  Neck: supple  CV: (+) S1/S2 RRR  Lungs: clear to auscultation, no wheezes or rales  Abdomen: soft, non-tender, non-distended (+) BS  Ext: no edema  Skin: no rashes   Neuro: alert and oriented X 3, no focal deficits  Central Line:     RECENT CULTURES:  09-25 @ 19:05  .Blood  --  --  --    No growth to date.  --  09-25 @ 16:31  .Urine  --  --  --    No growth  --  09-25 @ 15:45  .Blood  --  --  --    No growth to date.  --  09-23 @ 09:55  .Urine  --  --  --    No growth  --  09-23 @ 01:11  .Blood  --  --  --    No growth to date.  --        LABS:                        6.9    31.1  )-----------( 70       ( 27 Sep 2019 07:19 )             23.0         Mean Cell Volume : 92.0 fl  Mean Cell Hemoglobin : 27.6 pg  Mean Cell Hemoglobin Concentration : 30.0 gm/dL  Auto Neutrophil # : 21.3 K/uL  Auto Lymphocyte # : 2.4 K/uL  Auto Monocyte # : 6.9 K/uL  Auto Eosinophil # : 0.4 K/uL  Auto Basophil # : 0.2 K/uL  Auto Neutrophil % : 63.0 %  Auto Lymphocyte % : 3.0 %  Auto Monocyte % : 19.0 %  Auto Eosinophil % : 3.0 %  Auto Basophil % : x      09-27    133<L>  |  104  |  44<H>  ----------------------------<  174<H>  4.5   |  17<L>  |  1.46<H>    Ca    8.1<L>      27 Sep 2019 07:19  Phos  6.0     09-27  Mg     2.2     09-27    TPro  5.6<L>  /  Alb  2.9<L>  /  TBili  0.6  /  DBili  x   /  AST  20  /  ALT  12  /  AlkPhos  113  09-27      Mg 2.2  Phos 6.0  Mg 2.1  Phos 6.2      PT/INR - ( 27 Sep 2019 07:19 )   PT: 13.0 sec;   INR: 1.14 ratio         PTT - ( 27 Sep 2019 07:19 )  PTT:33.5 sec    LDH 1487  Uric Acid 4.6    LDH 1920  Uric Acid 4.1        RADIOLOGY & ADDITIONAL STUDIES: Diagnosis: CMMoL    Protocol/Chemo Regimen: TBD    : Shayy Varela    Day: N/A     Pt endorsed: rash to back and abdominal pain both continue to improved as per pt    Review of Systems: Patient denies headache, dizziness, visual changes, chest pain, palpitations, SOB, nausea, vomiting, diarrhea or dysuria.    Pain scale: 2/10 abdominal pain                                    Diet: Consistent Carb    Allergies: No Known Allergies        ANTIMICROBIALS  levoFLOXacin  Tablet 250 milliGRAM(s) Oral every 24 hours  tenofovir disoproxil fumarate (VIREAD) 300 milliGRAM(s) Oral <User Schedule>      HEME/ONC MEDICATIONS  hydroxyurea 1000 milliGRAM(s) Oral every 8 hours      STANDING MEDICATIONS  allopurinol 100 milliGRAM(s) Oral daily  amLODIPine   Tablet 5 milliGRAM(s) Oral daily  AQUAPHOR (petrolatum Ointment) 1 Application(s) Topical two times a day  benzonatate 100 milliGRAM(s) Oral three times a day  Biotene Dry Mouth Oral Rinse 5 milliLiter(s) Swish and Spit five times a day  bisoprolol   Tablet 5 milliGRAM(s) Oral daily  calcium acetate 667 milliGRAM(s) Oral four times a day with meals  calcium acetate 667 milliGRAM(s) Oral four times a day with meals  chlorhexidine 2% Cloths 1 Application(s) Topical <User Schedule>  dextrose 5%. 1000 milliLiter(s) IV Continuous <Continuous>  dextrose 50% Injectable 12.5 Gram(s) IV Push once  dextrose 50% Injectable 25 Gram(s) IV Push once  dextrose 50% Injectable 25 Gram(s) IV Push once  docusate sodium 100 milliGRAM(s) Oral three times a day  finasteride 5 milliGRAM(s) Oral daily  influenza   Vaccine 0.5 milliLiter(s) IntraMuscular once  insulin lispro (HumaLOG) corrective regimen sliding scale   SubCutaneous three times a day before meals  insulin lispro (HumaLOG) corrective regimen sliding scale   SubCutaneous at bedtime  lidocaine 2% Injectable 1 Vial(s) Local Injection once  melatonin 5 milliGRAM(s) Oral at bedtime  nicotine - 21 mG/24Hr(s) Patch 1 patch Transdermal daily  pramoxine 1% Topical Lotion 1 Application(s) Topical two times a day  simvastatin 10 milliGRAM(s) Oral at bedtime  sodium chloride 0.9%. 1000 milliLiter(s) IV Continuous <Continuous>  tamsulosin 0.4 milliGRAM(s) Oral at bedtime  triamcinolone 0.1% Ointment 1 Application(s) Topical two times a day      PRN MEDICATIONS  acetaminophen   Tablet .. 650 milliGRAM(s) Oral every 6 hours PRN  dextrose 40% Gel 15 Gram(s) Oral once PRN  glucagon  Injectable 1 milliGRAM(s) IntraMuscular once PRN  morphine  - Injectable 2 milliGRAM(s) IV Push every 4 hours PRN  zolpidem 5 milliGRAM(s) Oral at bedtime PRN        Vital Signs Last 24 Hrs  T(C): 36.9 (27 Sep 2019 05:23), Max: 36.9 (27 Sep 2019 05:23)  T(F): 98.5 (27 Sep 2019 05:23), Max: 98.5 (27 Sep 2019 05:23)  HR: 78 (27 Sep 2019 05:23) (73 - 78)  BP: 114/54 (27 Sep 2019 05:23) (94/53 - 117/58)  BP(mean): --  RR: 18 (27 Sep 2019 05:23) (16 - 18)  SpO2: 96% (27 Sep 2019 05:23) (95% - 99%)    PHYSICAL EXAM  General: NAD  HEENT: PERRLA, EOMI, clear oropharynx  Neck: supple  CV: (+) S1/S2 RRR  Lungs: clear to auscultation, no wheezes or rales  Abdomen: soft, non-tender, non-distended (+) BS  Ext: no edema  Skin: no rashes   Neuro: alert and oriented X 3, no focal deficits  Central Line: C/D/I      LABS:                        6.9    31.1  )-----------( 70       ( 27 Sep 2019 07:19 )             23.0         Mean Cell Volume : 92.0 fl  Mean Cell Hemoglobin : 27.6 pg  Mean Cell Hemoglobin Concentration : 30.0 gm/dL  Auto Neutrophil # : 21.3 K/uL  Auto Lymphocyte # : 2.4 K/uL  Auto Monocyte # : 6.9 K/uL  Auto Eosinophil # : 0.4 K/uL  Auto Basophil # : 0.2 K/uL  Auto Neutrophil % : 63.0 %  Auto Lymphocyte % : 3.0 %  Auto Monocyte % : 19.0 %  Auto Eosinophil % : 3.0 %  Auto Basophil % : x      09-27    133<L>  |  104  |  44<H>  ----------------------------<  174<H>  4.5   |  17<L>  |  1.46<H>    Ca    8.1<L>      27 Sep 2019 07:19  Phos  6.0     09-27  Mg     2.2     09-27    TPro  5.6<L>  /  Alb  2.9<L>  /  TBili  0.6  /  DBili  x   /  AST  20  /  ALT  12  /  AlkPhos  113  09-27      Mg 2.2  Phos 6.0  Mg 2.1  Phos 6.2      PT/INR - ( 27 Sep 2019 07:19 )   PT: 13.0 sec;   INR: 1.14 ratio         PTT - ( 27 Sep 2019 07:19 )  PTT:33.5 sec    LDH 1487  Uric Acid 4.6    LDH 1920  Uric Acid 4.1    RECENT CULTURES:  09-25 @ 19:05  .Blood  --  --  --    No growth to date.  --  09-25 @ 16:31  .Urine  --  --  --    No growth  --  09-25 @ 15:45  .Blood  --  --  --    No growth to date.  --  09-23 @ 09:55  .Urine  --  --  --    No growth  --  09-23 @ 01:11  .Blood  --  --  --    No growth to date.  --        RADIOLOGY & ADDITIONAL STUDIES:    EXAM:  CT ABDOMEN AND PELVIS OC IC                        PROCEDURE DATE:  09/24/2019    IMPRESSION: Hepatomegaly, with bilobar hypodense lesions, suspicious for metastatic   disease. Splenomegaly, with multiple splenic infarcts. Retroperitoneal lymphadenopathy.  Enlarged prostate gland. A 4.8 cm infrarenal abdominal aortic aneurysm, status post bifurcated   aortic stent graft. Diagnosis: CMMoL    Protocol/Chemo Regimen: TBD    : Shayy Varela, wife refusing [place]     Day: N/A     Pt endorsed: rash to back and abdominal pain both continue to improved as per pt    Review of Systems: Patient denies headache, dizziness, visual changes, chest pain, palpitations, SOB, nausea, vomiting, diarrhea or dysuria.    Pain scale: 2/10 abdominal pain                                    Diet: Consistent Carb    Allergies: No Known Allergies        ANTIMICROBIALS  levoFLOXacin  Tablet 250 milliGRAM(s) Oral every 24 hours  tenofovir disoproxil fumarate (VIREAD) 300 milliGRAM(s) Oral <User Schedule>      HEME/ONC MEDICATIONS  hydroxyurea 1000 milliGRAM(s) Oral every 8 hours      STANDING MEDICATIONS  allopurinol 100 milliGRAM(s) Oral daily  amLODIPine   Tablet 5 milliGRAM(s) Oral daily  AQUAPHOR (petrolatum Ointment) 1 Application(s) Topical two times a day  benzonatate 100 milliGRAM(s) Oral three times a day  Biotene Dry Mouth Oral Rinse 5 milliLiter(s) Swish and Spit five times a day  bisoprolol   Tablet 5 milliGRAM(s) Oral daily  calcium acetate 667 milliGRAM(s) Oral four times a day with meals  calcium acetate 667 milliGRAM(s) Oral four times a day with meals  chlorhexidine 2% Cloths 1 Application(s) Topical <User Schedule>  dextrose 5%. 1000 milliLiter(s) IV Continuous <Continuous>  dextrose 50% Injectable 12.5 Gram(s) IV Push once  dextrose 50% Injectable 25 Gram(s) IV Push once  dextrose 50% Injectable 25 Gram(s) IV Push once  docusate sodium 100 milliGRAM(s) Oral three times a day  finasteride 5 milliGRAM(s) Oral daily  influenza   Vaccine 0.5 milliLiter(s) IntraMuscular once  insulin lispro (HumaLOG) corrective regimen sliding scale   SubCutaneous three times a day before meals  insulin lispro (HumaLOG) corrective regimen sliding scale   SubCutaneous at bedtime  lidocaine 2% Injectable 1 Vial(s) Local Injection once  melatonin 5 milliGRAM(s) Oral at bedtime  nicotine - 21 mG/24Hr(s) Patch 1 patch Transdermal daily  pramoxine 1% Topical Lotion 1 Application(s) Topical two times a day  simvastatin 10 milliGRAM(s) Oral at bedtime  sodium chloride 0.9%. 1000 milliLiter(s) IV Continuous <Continuous>  tamsulosin 0.4 milliGRAM(s) Oral at bedtime  triamcinolone 0.1% Ointment 1 Application(s) Topical two times a day      PRN MEDICATIONS  acetaminophen   Tablet .. 650 milliGRAM(s) Oral every 6 hours PRN  dextrose 40% Gel 15 Gram(s) Oral once PRN  glucagon  Injectable 1 milliGRAM(s) IntraMuscular once PRN  morphine  - Injectable 2 milliGRAM(s) IV Push every 4 hours PRN  zolpidem 5 milliGRAM(s) Oral at bedtime PRN      Vital Signs Last 24 Hrs  T(C): 36.9 (27 Sep 2019 05:23), Max: 36.9 (27 Sep 2019 05:23)  T(F): 98.5 (27 Sep 2019 05:23), Max: 98.5 (27 Sep 2019 05:23)  HR: 78 (27 Sep 2019 05:23) (73 - 78)  BP: 114/54 (27 Sep 2019 05:23) (94/53 - 117/58)  BP(mean): --  RR: 18 (27 Sep 2019 05:23) (16 - 18)  SpO2: 96% (27 Sep 2019 05:23) (95% - 99%)      PHYSICAL EXAM  General: NAD  HEENT: PERRLA, EOMI, clear oropharynx  Neck: supple  CV: (+) S1/S2 RRR  Lungs: clear to auscultation, no wheezes or rales  Abdomen: soft, non-tender, non-distended (+) BS  Ext: no edema  Skin: no rashes   Neuro: alert and oriented X 3, no focal deficits  Central Line: C/D/I      LABS:                        6.9    31.1  )-----------( 70       ( 27 Sep 2019 07:19 )             23.0         Mean Cell Volume : 92.0 fl  Mean Cell Hemoglobin : 27.6 pg  Mean Cell Hemoglobin Concentration : 30.0 gm/dL  Auto Neutrophil # : 21.3 K/uL  Auto Lymphocyte # : 2.4 K/uL  Auto Monocyte # : 6.9 K/uL  Auto Eosinophil # : 0.4 K/uL  Auto Basophil # : 0.2 K/uL  Auto Neutrophil % : 63.0 %  Auto Lymphocyte % : 3.0 %  Auto Monocyte % : 19.0 %  Auto Eosinophil % : 3.0 %  Auto Basophil % : x      09-27    133<L>  |  104  |  44<H>  ----------------------------<  174<H>  4.5   |  17<L>  |  1.46<H>    Ca    8.1<L>      27 Sep 2019 07:19  Phos  6.0     09-27  Mg     2.2     09-27    TPro  5.6<L>  /  Alb  2.9<L>  /  TBili  0.6  /  DBili  x   /  AST  20  /  ALT  12  /  AlkPhos  113  09-27      Mg 2.2  Phos 6.0      PT/INR - ( 27 Sep 2019 07:19 )   PT: 13.0 sec;   INR: 1.14 ratio         PTT - ( 27 Sep 2019 07:19 )  PTT:33.5 sec    LDH 1487  Uric Acid 4.6      RECENT CULTURES:    Culture - Blood (09.25.19 @ 19:05)    Specimen Source: .Blood    Culture Results:   No growth to date.    Culture - Urine (09.25.19 @ 16:31)    Specimen Source: .Urine    Culture Results:   No growth      RADIOLOGY & ADDITIONAL STUDIES:    EXAM:  CT ABDOMEN AND PELVIS OC IC                        PROCEDURE DATE:  09/24/2019    IMPRESSION: Hepatomegaly, with bilobar hypodense lesions, suspicious for metastatic   disease. Splenomegaly, with multiple splenic infarcts. Retroperitoneal lymphadenopathy.  Enlarged prostate gland. A 4.8 cm infrarenal abdominal aortic aneurysm, status post bifurcated   aortic stent graft.

## 2022-04-18 NOTE — TELEPHONE ENCOUNTER
Patient called back to reschedule   Patient is now scheduled with Himanshu Morrow on 7-1-2022 @ 11:00am

## 2022-06-10 ENCOUNTER — ANNUAL EXAM (OUTPATIENT)
Dept: OBGYN CLINIC | Facility: CLINIC | Age: 77
End: 2022-06-10
Payer: COMMERCIAL

## 2022-06-10 VITALS
SYSTOLIC BLOOD PRESSURE: 148 MMHG | WEIGHT: 131.6 LBS | BODY MASS INDEX: 22.47 KG/M2 | HEIGHT: 64 IN | DIASTOLIC BLOOD PRESSURE: 70 MMHG

## 2022-06-10 DIAGNOSIS — Z01.419 ENCOUNTER FOR GYNECOLOGICAL EXAMINATION WITHOUT ABNORMAL FINDING: Primary | ICD-10-CM

## 2022-06-10 PROCEDURE — S0612 ANNUAL GYNECOLOGICAL EXAMINA: HCPCS | Performed by: PHYSICIAN ASSISTANT

## 2022-06-10 NOTE — PROGRESS NOTES
Peotone Brien TUAN Akins   1945    CC:  Yearly exam    S:  64 y o  female here for yearly exam  She is s/p MARCO BSO in  for ovarian cancer, and s/p left breast lumpectomy in 2019 for breast cancer  She follows with Dr Tea Shah for her hx of breast cancer  She is maintained on anastrozole  She has osteopenia that is managed with Priolia  She denies vaginal bleeding  She denies vaginal discharge, itching, odor or dryness  She is not sexually active with penetration but is creative in other ways  She continues to work for [employer]       Last Mammo 21 neg   Last Colonoscopy   Last DEXA 21 osteopenia    Family hx of breast cancer: mother  Family hx of ovarian cancer: no  Family hx of colon cancer: maternal uncle      Current Outpatient Medications:     anastrozole (ARIMIDEX) 1 mg tablet, Take 1 tablet (1 mg total) by mouth daily, Disp: 90 tablet, Rfl: 3    aspirin 81 MG tablet, Take by mouth, Disp: , Rfl:     Calcium Citrate-Vitamin D 250-200 MG-UNIT TABS, Take by mouth, Disp: , Rfl:     Denosumab (PROLIA SC), Inject under the skin every 6 (six) months, Disp: , Rfl:     HM VITAMIN D3 2000 units CAPS, Take by mouth, Disp: , Rfl:     multivitamin (THERAGRAN) TABS, Take 1 tablet by mouth, Disp: , Rfl:     pyridoxine (VITAMIN B6) 100 mg tablet, Take by mouth, Disp: , Rfl:     spironolactone (ALDACTONE) 50 mg tablet, Take 50 mg by mouth daily, Disp: , Rfl:   Social History     Socioeconomic History    Marital status: /Civil Union     Spouse name: Not on file    Number of children: Not on file    Years of education: Not on file    Highest education level: Not on file   Occupational History    Occupation:    Tobacco Use    Smoking status: Former Smoker     Packs/day: 0 50     Years: 45 00     Pack years: 22 50     Quit date:      Years since quittin 4    Smokeless tobacco: Never Used    Tobacco comment: Quit   Vaping Use    Vaping Use: Not on file   Substance and Sexual Activity    Alcohol use:  Yes     Alcohol/week: 1 0 standard drink     Types: 1 Glasses of wine per week     Comment: occasional    Drug use: No    Sexual activity: Not Currently     Partners: Male     Birth control/protection: Post-menopausal, Surgical     Comment:    Other Topics Concern    Not on file   Social History Narrative    Not on file     Social Determinants of Health     Financial Resource Strain: Not on file   Food Insecurity: Not on file   Transportation Needs: Not on file   Physical Activity: Not on file   Stress: Not on file   Social Connections: Not on file   Intimate Partner Violence: Not on file   Housing Stability: Not on file     Family History   Problem Relation Age of Onset    Heart failure Mother     Breast cancer Mother 78    Hypertension Father     Stroke Father     No Known Problems Brother     No Known Problems Brother     No Known Problems Daughter     No Known Problems Maternal Grandmother     Cancer Maternal Grandfather 61        chest and neck    No Known Problems Paternal Grandmother     Cancer Paternal Grandfather         unknown type and age   [place] No Known Problems Daughter     Stomach cancer Maternal Aunt 46    Colon cancer Maternal Uncle 71    Lung cancer Maternal Aunt 48    No Known Problems Paternal Aunt     No Known Problems Paternal Aunt     No Known Problems Paternal Aunt      Past Medical History:   Diagnosis Date    Basal cell carcinoma     Bulbar polio     Cataract     Colon polyp     Curvature of spine     Exercise involving walking     "at work alot"    History of anemia as a child     "after polio"    History of radiation therapy 2019    left breast    Lymphedema     left leg/"after abdominal lymph nodes removed"/wears thigh high compresion stocking"    Macular degeneration     "and slight glaucoma per eye doctor both eyes"    Muscle weakness     "from polio, predominantly right side"  Osteopenia     Osteoporosis     [facility]varian cancer [facility] [address] ) 2005    radical hysterectomy w/ chemo    Scoliosis     with right hip pain occas    Swallowing difficulty     "at times since polio"    Thyroid nodule     Wears glasses       Allergies   Allergen Reactions    Metoclopramide Myalgia     Reaction Date: [id number]; Annotation - [id number]: hand cramps and biting of cheeks       Review of Systems   Respiratory: Negative  Cardiovascular: Negative  Gastrointestinal: Negative for constipation and diarrhea  Genitourinary: Negative for difficulty urinating, pelvic pain, vaginal bleeding, vaginal discharge, itching, or odor  O:  Blood pressure 148/70, height 5' 4" (1 626 m), weight 59 7 kg (131 lb 9 6 oz)  Patient appears well and is not in distress  Neck is supple without masses  Breasts are symmetrical without mass, tenderness, nipple discharge, skin changes or adenopathy  Abdomen is soft and nontender without masses  External genitals are normal without lesions or rashes  Urethral meatus and urethra are normal  Vagina is normal without discharge or bleeding  Cervix and uterus are surgically absent  Adnexa have no masses, nontender     A:   Yearly exam      P:   Mammo scheduled thru Dr Radha Yancey   Colonoscopy up to date   DEXA per PCP      RTO one year for yearly exam or sooner as needed

## 2022-06-15 ENCOUNTER — HOSPITAL ENCOUNTER (OUTPATIENT)
Dept: MAMMOGRAPHY | Facility: CLINIC | Age: 77
Discharge: HOME | End: 2022-06-15
Payer: COMMERCIAL

## 2022-06-15 VITALS — BODY MASS INDEX: 22.36 KG/M2 | WEIGHT: 131 LBS | HEIGHT: 64 IN

## 2022-06-15 DIAGNOSIS — Z17.0 MALIGNANT NEOPLASM OF UPPER-OUTER QUADRANT OF LEFT BREAST IN FEMALE, ESTROGEN RECEPTOR POSITIVE (HCC): ICD-10-CM

## 2022-06-15 DIAGNOSIS — C50.412 MALIGNANT NEOPLASM OF UPPER-OUTER QUADRANT OF LEFT BREAST IN FEMALE, ESTROGEN RECEPTOR POSITIVE (HCC): ICD-10-CM

## 2022-06-15 PROCEDURE — G0279 TOMOSYNTHESIS, MAMMO: HCPCS

## 2022-06-15 PROCEDURE — 77066 DX MAMMO INCL CAD BI: CPT

## 2022-06-20 ENCOUNTER — RADIATION ONCOLOGY FOLLOW-UP (OUTPATIENT)
Dept: RADIATION ONCOLOGY | Facility: CLINIC | Age: 77
End: 2022-06-20
Attending: RADIOLOGY

## 2022-06-20 ENCOUNTER — CLINICAL SUPPORT (OUTPATIENT)
Dept: RADIATION ONCOLOGY | Facility: CLINIC | Age: 77
End: 2022-06-20
Attending: RADIOLOGY
Payer: COMMERCIAL

## 2022-06-20 VITALS
WEIGHT: 130.51 LBS | RESPIRATION RATE: 16 BRPM | BODY MASS INDEX: 22.28 KG/M2 | TEMPERATURE: 97.6 F | HEIGHT: 64 IN | OXYGEN SATURATION: 94 % | HEART RATE: 85 BPM | SYSTOLIC BLOOD PRESSURE: 126 MMHG | DIASTOLIC BLOOD PRESSURE: 64 MMHG

## 2022-06-20 DIAGNOSIS — C50.412 MALIGNANT NEOPLASM OF UPPER-OUTER QUADRANT OF LEFT BREAST IN FEMALE, ESTROGEN RECEPTOR POSITIVE (HCC): Primary | ICD-10-CM

## 2022-06-20 DIAGNOSIS — Z17.0 MALIGNANT NEOPLASM OF UPPER-OUTER QUADRANT OF LEFT BREAST IN FEMALE, ESTROGEN RECEPTOR POSITIVE (HCC): Primary | ICD-10-CM

## 2022-06-20 PROCEDURE — 99213 OFFICE O/P EST LOW 20 MIN: CPT | Performed by: RADIOLOGY

## 2022-06-20 NOTE — PROGRESS NOTES
Abiodun Burgos 1945 is a 64 y o  female     Follow up visit     Abiodun Burgos is a 64year old female who completed a course of adjuvant radiation therapy for Stage IB (pT2, pN1(sn), cM0, G2, ER+, [place]+, HER2-)  left breast carcinoma on 10/23/2019  She was started on adjuant hormonal therapy with anastrozole in 2019  She has history of ovarian cancer in , genetic testing was negative  She was last seen on 21 SurgOn, Dr Dina CHAN on exam      [facility]  Does not need anastrozole at this time  Follow up in 6 months  6/15/22 Mammo diagnostic bilateral w 3d & cad  IMPRESSION:   Stable exam      ASSESSMENT/BI-RADS CATEGORY:  Overall: 2 - Benign     RECOMMENDATION: Diagnostic mammogram in 1 year of the breast(s)  Today, she reports feeling well overall  No breast complaints  No breast/arm swelling  She is taking anastrozole daily, tolerating well wit no adverse effects reported  [facility]  [facility] Gyn, Dr Radhika Garrido      Oncology History   Malignant neoplasm of upper-outer quadrant of left breast in female, estrogen receptor positive ([facility] [address] )   2019 Initial Diagnosis    Breast cancer ([facility] [address] )     2019 Biopsy    Left breast, ultrasound-guided biopsy, 200 8cmfn 4 passes 12g Marquee:  - Invasive mammary carcinoma of no special type (ductal, not otherwise specified)    - grade 2  - %  - %  - HER2 by FISH negative     2019 Genetic Testing    BRCA negative     2019 -  Cancer Staged    Staging form: Breast, AJCC 8th Edition  - Clinical: Stage IB (cT2, cN0, cM0, G2, ER+, [place]+, HER2-) - Signed by Andie Balderas MD on 2019  Laterality: Left  Method of lymph node assessment: Clinical  Histologic grading system: 3 grade system       2019 Surgery    Left breast lumpectomy with SLN biopsy:  - Invasive mammary carcinoma, 2 7 cm, grade 2  - approximately 80% of tumor is DCIS  - LN's - one lymph node with metastatic carcinoma, 2 1mm deposit, no extranodal extention  - + LVI  - margins negative     8/12/2019 -  Cancer Staged    Staging form: Breast, AJCC 8th Edition  - Pathologic: Stage IB (pT2, pN1(sn), cM0, G2, ER+, MN+, HER2-) - Signed by Mariana Stark MD on 8/12/2019  Neoadjuvant therapy: No  Laterality: Left  Method of lymph node assessment: [place] lymph node biopsy  Histologic grading system: 3 grade system       9/25/2019 - 10/23/2019 Radiation    Plan ID Energy Fractions Dose per Fraction (cGy) Dose Correction (cGy) Total Dose Delivered (cGy) Elapsed Days   BH L Breast 6X 16 / 16 266 0 4,256 21   L Brst Boost 12E 5 / 5 200 0 1,000 6     Dr Rony Carlisle     9/2019 -  Hormone Therapy    Anastrozole 1 mg daily      Ovarian cancer ([facility] [address] )   2005 Initial Diagnosis    Ovarian cancer [facility])     2005 Surgery    MARCO/BSO    Dr Kerri Fu     2005 -  Chemotherapy    Dr Kerri Fu         Review of Systems:  Review of Systems   Constitutional: Positive for fatigue (mild,varies daily  still doing office work, working full time)  Negative for appetite change and unexpected weight change  HENT: Negative  Eyes: Negative  Negative for visual disturbance  Wears glasses   Respiratory: Positive for cough and shortness of breath (CALVILLO with certain activies)  Negative for chest tightness  Cardiovascular: Negative  Negative for chest pain  Gastrointestinal: Negative  Endocrine: Positive for heat intolerance (can't tolerate humidity)  Genitourinary: Negative  Negative for difficulty urinating and vaginal bleeding  Musculoskeletal: Positive for arthralgias (fingers, ankles, intermittent ) and gait problem (mild limp due to scoliosis, more pronounced when she's tired)  Mild scoliosis, occasional right hip discomfort  Skin: Negative  Negative for color change, rash and wound  Allergic/Immunologic: Negative  Neurological: Positive for dizziness (once in a while)   Negative for light-headedness and numbness  Hematological: Bruises/bleeds easily  Psychiatric/Behavioral: Negative  Negative for dysphoric mood and sleep disturbance         Clinical Trial: no    Teaching    Covid Vaccine Status: up to date    Health Maintenance   Topic Date Due    Hepatitis C Screening  Never done    DTaP,Tdap,and Td Vaccines (1 - Tdap) Never done    Fall Risk  Never done    COVID-19 Vaccine (4 - Booster for Panda Peter series) 03/01/2022    Annual Physical  06/07/2022    Depression Screening  06/18/2022    BMI: Adult  06/15/2023    Breast Cancer Screening: Mammogram  06/15/2023    Osteoporosis Screening  Completed    Pneumococcal Vaccine: 65+ Years  Completed    Influenza Vaccine  Completed    HIB Vaccine  Aged Out    Hepatitis B Vaccine  Aged Out    IPV Vaccine  Aged Out    Hepatitis A Vaccine  Aged Out    Meningococcal ACWY Vaccine  Aged Out    HPV Vaccine  Aged Out     Patient Active Problem List   Diagnosis    Malignant neoplasm of upper-outer quadrant of left breast in female, estrogen receptor positive ([facility] [address] )    BRCA negative    Ovarian cancer ([facility] [address] )    Use of anastrozole    Osteopenia of left hip    Dense breast tissue     Past Medical History:   Diagnosis Date    Basal cell carcinoma     Breast cancer ([facility] [address] ) 07/26/2019    Bulbar polio     Cataract     Colon polyp     Curvature of spine     Exercise involving walking     "at work alot"    History of anemia as a child     "after polio"    History of radiation therapy 2019    left breast    Lymphedema     left leg/"after abdominal lymph nodes removed"/wears thigh high compresion stocking"    Macular degeneration     "and slight glaucoma per eye doctor both eyes"    Muscle weakness     "from polio, predominantly right side"    Osteopenia     Osteoporosis     Ovarian cancer ([facility] [address] ) 2005    radical hysterectomy w/ chemo    Scoliosis     with right hip pain occas    Swallowing difficulty     "at times since polio"    Thyroid nodule     Wears glasses      Past Surgical History:   Procedure Laterality Date    ABDOMINAL HYSTERECTOMY N/A     ovarian cancer    BOWEL RESECTION      BREAST BIOPSY Left 2015    benign    BREAST BIOPSY Right 2014    benign    BREAST BIOPSY Left     benign    BREAST BIOPSY Left 2019    [facility]    BREAST LUMPECTOMY Left 2019    Procedure: LUMPECTOMY BREAST NEEDLE LOC at 1100;  Surgeon: Nick Vega MD;  [facility];  Service: Surgical Oncology    CATARACT EXTRACTION Right     CATARACT EXTRACTION Left 2021     SECTION      COLONOSCOPY      HYSTERECTOMY      age 56    LYMPH NODE BIOPSY Left 2019    Procedure: SENTINEL NODE BIOPSY- NUC MED INJ at 1200;  Surgeon: Nick Vega MD;  [facility];  Service: Surgical Oncology    LYMPHADENECTOMY      MAMMO NEEDLE LOCALIZATION LEFT (ALL INC) Left 2019    SKIN CANCER EXCISION      US GUIDED BREAST BIOPSY LEFT COMPLETE Left 2019     Family History   Problem Relation Age of Onset    Heart failure Mother     Breast cancer Mother 78    Hypertension Father     Stroke Father     No Known Problems Brother     No Known Problems Brother     No Known Problems Daughter     No Known Problems Maternal Grandmother     Cancer Maternal Grandfather 61        chest and neck    No Known Problems Paternal Grandmother     Cancer Paternal Grandfather         unknown type and age   [facility] No Known Problems Daughter     Stomach cancer Maternal Aunt 46    Colon cancer Maternal Uncle 71    Lung cancer Maternal Aunt 48    No Known Problems Paternal Aunt     No Known Problems Paternal Aunt     No Known Problems Paternal Aunt      Social History     Socioeconomic History    Marital status: /Civil Union     Spouse name: Not on file    Number of children: Not on file    Years of education: Not on file    Highest education level: Not on file   Occupational History    Occupation:    Tobacco Use    Smoking status: Former Smoker     Packs/day: 0 50     Years: 45 00     Pack years: 22 50     Quit date:      Years since quittin 4    Smokeless tobacco: Never Used    Tobacco comment: Quit   Vaping Use    Vaping Use: Not on file   Substance and Sexual Activity    Alcohol use: Yes     Alcohol/week: 1 0 standard drink     Types: 1 Glasses of wine per week     Comment: occasional    Drug use: No    Sexual activity: Not Currently     Partners: Male     Birth control/protection: Post-menopausal, Surgical     Comment:    Other Topics Concern    Not on file   Social History Narrative    Not on file     Social Determinants of Health     Financial Resource Strain: Not on file   Food Insecurity: Not on file   Transportation Needs: Not on file   Physical Activity: Not on file   Stress: Not on file   Social Connections: Not on file   Intimate Partner Violence: Not on file   Housing Stability: Not on file       Current Outpatient Medications:     anastrozole (ARIMIDEX) 1 mg tablet, Take 1 tablet (1 mg total) by mouth daily, Disp: 90 tablet, Rfl: 3    aspirin 81 MG tablet, Take by mouth, Disp: , Rfl:     Calcium Citrate-Vitamin D 250-200 MG-UNIT TABS, Take by mouth, Disp: , Rfl:     Denosumab (PROLIA SC), Inject under the skin every 6 (six) months, Disp: , Rfl:     HM VITAMIN D3 2000 units CAPS, Take by mouth, Disp: , Rfl:     multivitamin (THERAGRAN) TABS, Take 1 tablet by mouth, Disp: , Rfl:     pyridoxine (VITAMIN B6) 100 mg tablet, Take by mouth, Disp: , Rfl:     spironolactone (ALDACTONE) 50 mg tablet, Take 50 mg by mouth daily, Disp: , Rfl:   Allergies   Allergen Reactions    Metoclopramide Myalgia     Reaction Date: 2011; Annotation - [id number]: hand cramps and biting of cheeks     There were no vitals filed for this visit

## 2022-06-20 NOTE — PROGRESS NOTES
Follow-up - Radiation Oncology   Lang Akins 1945 64 y o  female [id number]      History of Present Illness   Cancer Staging  Malignant neoplasm of upper-outer quadrant of left breast in female, estrogen receptor positive ([facility] [address] )  Staging form: Breast, AJCC 8th Edition  - Clinical: Stage IB (cT2, cN0, cM0, G2, ER+, MN+, HER2-) - Signed by Mariana Stark MD on 2019  Method of lymph node assessment: Clinical  Histologic grading system: 3 grade system  Laterality: Left  - Pathologic: Stage IB (pT2, pN1(sn), cM0, G2, ER+, MN+, HER2-) - Signed by Mariana Stark MD on 2019  Neoadjuvant therapy: No  Method of lymph node assessment: [place] lymph node biopsy  Histologic grading system: 3 grade system  Laterality: Left           Interval History:  Keven Kenyon is a 64year old female who completed a course of adjuvant radiation therapy for Stage IB (pT2, pN1(sn), cM0, G2, ER+, MN+, HER2-)  left breast carcinoma on 10/23/2019  She was started on adjuant hormonal therapy with anastrozole in 2019       She has history of ovarian cancer in 2005, genetic testing was negative      She was last seen on 21 SurgOn, Dr Freddie CHAN on exam        [facility]  Does not need anastrozole at this time  Follow up in 6 months           6/15/22 Mammo diagnostic bilateral w 3d & cad  IMPRESSION:   Stable exam      ASSESSMENT/BI-RADS CATEGORY:  Overall: 2 - Benign     RECOMMENDATION: Diagnostic mammogram in 1 year of the breast(s)        Today, she reports feeling well overall  No breast complaints  No breast/arm swelling     She is taking anastrozole daily, tolerating well wit no adverse effects reported          Upcomin22 SurgOn  22 HemOnc  23 Gyn, Dr Minor Triana           Historical Information   Oncology History   Malignant neoplasm of upper-outer quadrant of left breast in female, estrogen receptor positive ([facility] [address] )   2019 Initial Diagnosis Breast cancer ([facility] )     5/30/2019 Biopsy    Left breast, ultrasound-guided biopsy, 200 8cmfn 4 passes 12g Marquee:  - Invasive mammary carcinoma of no special type (ductal, not otherwise specified)    - grade 2  - %  - %  - HER2 by FISH negative     6/2019 Genetic Testing    BRCA negative     7/8/2019 -  Cancer Staged    Staging form: Breast, AJCC 8th Edition  - Clinical: Stage IB (cT2, cN0, cM0, G2, ER+, [place]+, HER2-) - Signed by Pamela Venegas MD on 7/8/2019  Laterality: Left  Method of lymph node assessment: Clinical  Histologic grading system: 3 grade system       7/26/2019 Surgery    Left breast lumpectomy with SLN biopsy:  - Invasive mammary carcinoma, 2 7 cm, grade 2  - approximately 80% of tumor is DCIS  - LN's - one lymph node with metastatic carcinoma, 2 1mm deposit, no extranodal extention  - + LVI  - margins negative     8/12/2019 -  Cancer Staged    Staging form: Breast, AJCC 8th Edition  - Pathologic: Stage IB (pT2, pN1(sn), cM0, G2, ER+, [place]+, HER2-) - Signed by Pamela Venegas MD on 8/12/2019  Neoadjuvant therapy: No  Laterality: Left  Method of lymph node assessment: [place] lymph node biopsy  Histologic grading system: 3 grade system       9/25/2019 - 10/23/2019 Radiation    Plan ID Energy Fractions Dose per Fraction (cGy) Dose Correction (cGy) Total Dose Delivered (cGy) Elapsed Days   BH L Breast 6X 16 / 16 266 0 4,256 21   L Brst Boost 12E 5 / 5 200 0 1,000 6     Dr Kwan Nip     9/2019 -  Hormone Therapy    Anastrozole 1 mg daily      [facility]   2005 Initial Diagnosis    Ovarian cancer [facility])     2005 Surgery    MARCO/BSO    Dr Marge Alvarez     2005 -  Chemotherapy    Dr Marge Alvarez         Past Medical History:   Diagnosis Date    Basal cell carcinoma     Breast cancer ([facility] ) 07/26/2019    Bulbar polio     Cataract     Colon polyp     Curvature of spine     Exercise involving walking     "at work alot"    History of anemia as a child     "after polio"    History of radiation therapy     left breast    Lymphedema     left leg/"after abdominal lymph nodes removed"/wears thigh high compresion stocking"    Macular degeneration     "and slight glaucoma per eye doctor both eyes"    Muscle weakness     "from polio, predominantly right side"    Osteopenia     Osteoporosis     Ovarian cancer ([address] ) 2005    radical hysterectomy w/ chemo    Scoliosis     with right hip pain occas    Swallowing difficulty     "at times since polio"    Thyroid nodule     Wears glasses      Past Surgical History:   Procedure Laterality Date    ABDOMINAL HYSTERECTOMY N/A     ovarian cancer    BOWEL RESECTION      BREAST BIOPSY Left 2015    benign    BREAST BIOPSY Right 2014    benign    BREAST BIOPSY Left     benign    BREAST BIOPSY Left 2019    [facility]    BREAST LUMPECTOMY Left 2019    Procedure: LUMPECTOMY BREAST NEEDLE LOC at 1100;  Surgeon: Nick Vega MD;  [facility];  Service: Surgical Oncology    CATARACT EXTRACTION Right     CATARACT EXTRACTION Left 2021     SECTION      COLONOSCOPY      HYSTERECTOMY      age 56    LYMPH NODE BIOPSY Left 2019    Procedure: SENTINEL NODE BIOPSY- [address] [id number] at 1200;  Surgeon: Nick Vega MD;  [facility];  Service: Surgical Oncology    LYMPHADENECTOMY      MAMMO NEEDLE LOCALIZATION LEFT (ALL INC) Left 2019    SKIN CANCER EXCISION      US GUIDED BREAST BIOPSY LEFT COMPLETE Left 2019       Social History   Social History     Substance and Sexual Activity   Alcohol Use Yes    Alcohol/week: 1 0 standard drink    Types: 1 Glasses of wine per week    Comment: occasional     Social History     Substance and Sexual Activity   Drug Use No     Social History     Tobacco Use   Smoking Status Former Smoker    Packs/day: 0 50    Years: 45 00    Pack years: 22 50    Quit date:     Years since quittin 4   Smokeless Tobacco Never Used   Tobacco Comment    Quit Meds/Allergies     Current Outpatient Medications:     anastrozole (ARIMIDEX) 1 mg tablet, Take 1 tablet (1 mg total) by mouth daily, Disp: 90 tablet, Rfl: 3    aspirin 81 MG tablet, Take by mouth, Disp: , Rfl:     Calcium Citrate-Vitamin D 250-200 MG-UNIT TABS, Take by mouth, Disp: , Rfl:     Denosumab (PROLIA SC), Inject under the skin every 6 (six) months, Disp: , Rfl:     HM VITAMIN D3 2000 units CAPS, Take by mouth in the morning, Disp: , Rfl:     multivitamin (THERAGRAN) TABS, Take 1 tablet by mouth daily, Disp: , Rfl:     pyridoxine (VITAMIN B6) 100 mg tablet, Take by mouth, Disp: , Rfl:     spironolactone (ALDACTONE) 50 mg tablet, Take 50 mg by mouth daily, Disp: , Rfl:   Allergies   Allergen Reactions    Metoclopramide Myalgia     Reaction Date: 03Jun2011; Annotation - [id number]: hand cramps and biting of cheeks         Review of Systems   Constitutional: Positive for fatigue (mild,varies daily  still doing office work, working full time)  Negative for appetite change and unexpected weight change  HENT: Negative  Eyes: Negative  Negative for visual disturbance  Wears glasses   Respiratory: Positive for cough and shortness of breath (CALVILLO with certain activies)  Negative for chest tightness  Cardiovascular: Negative  Negative for chest pain  Gastrointestinal: Negative  Endocrine: Positive for heat intolerance (can't tolerate humidity)  Genitourinary: Negative  Negative for difficulty urinating and vaginal bleeding  Musculoskeletal: Positive for arthralgias (fingers, ankles, intermittent ) and gait problem (mild limp due to scoliosis, more pronounced when she's tired)  Mild scoliosis, occasional right hip discomfort  Skin: Negative  Negative for color change, rash and wound  Allergic/Immunologic: Negative  Neurological: Positive for dizziness (once in a while)  Negative for light-headedness and numbness  Hematological: Bruises/bleeds easily  Psychiatric/Behavioral: Negative  Negative for dysphoric mood and sleep disturbance           OBJECTIVE:   /64 (BP Location: Left arm, Patient Position: Sitting)   Pulse 85   Temp 97 6 °F (36 4 °C) (Temporal)   Resp 16   Ht 5' 4" (1 626 m)   Wt 59 2 kg (130 lb 8 2 oz)   SpO2 94%   BMI 22 40 kg/m²   Pain Assessment:  0  ECOG/Zubrod/WHO: 0 - Asymptomatic    Physical Exam  Constitutional:       Appearance: She is well-developed  HENT:      Head: Normocephalic  Hair is normal    Eyes:      General: No scleral icterus  Cardiovascular:      Rate and Rhythm: Normal rate and regular rhythm  Pulmonary:      Effort: Pulmonary effort is normal  No respiratory distress  Breath sounds: Normal breath sounds  No wheezing  Chest:      Chest wall: No tenderness  Comments: There is no supraclavicular or axillary adenopathy palpable  The skin in the radiated field is in good condition  No suspicious lesions palpable in either breast there is no breast or arm edema  Abdominal:      General: Bowel sounds are normal  There is no distension  Palpations: Abdomen is soft  There is no mass  Tenderness: There is no abdominal tenderness  There is no rebound  Musculoskeletal:         General: No tenderness  Normal range of motion  Cervical back: Neck supple  No edema  No spinous process tenderness  Lymphadenopathy:      Cervical: No cervical adenopathy  Neurological:      Mental Status: She is alert  Cranial Nerves: No cranial nerve deficit  Coordination: Coordination normal       Gait: Gait normal    Psychiatric:         Speech: Speech normal          Behavior: Behavior normal               RESULTS    Lab Results: No results found for this or any previous visit (from the past 672 hour(s))      Imaging Studies:Mammo diagnostic bilateral w 3d & cad    Result Date: 6/15/2022  Narrative: DIAGNOSIS: Malignant neoplasm of upper-outer quadrant of left breast in female, estrogen receptor positive ([facility] [address] ) TECHNIQUE: Digital diagnostic mammography was performed  Computer Aided Detection (CAD) analyzed all applicable images  Computer Aided Detection (CAD) analyzed all applicable images  COMPARISONS: Prior breast imaging dated: 06/14/2021, 06/11/2020, 05/30/2019, 05/21/2019, 05/10/2018, 05/10/2018, 05/04/2017, 05/04/2017, 05/10/2016, 05/10/2016, 05/14/2015, 11/24/2014, 06/11/2014, 06/11/2014, 04/23/2014, 04/21/2014, 04/19/2013, and 10/08/2012 RELEVANT HISTORY: Family Breast Cancer History: History of breast cancer in Mother  Family Medical History: Family medical history includes breast cancer in mother and colon cancer in maternal uncle  Personal History: Hormone history includes estrogen replacement therapy and other  Surgical history includes breast biopsy, lumpectomy, and hysterectomy  Medical history includes breast cancer and ovarian cancer  RISK ASSESSMENT: [facility] risk assessment reporting was suppressed due to the patient's history and/or demographic factors  TISSUE DENSITY: The breasts are heterogeneously dense, which may obscure small masses  INDICATION: Moriah Reddy is a 64 y o  female presenting for yearly  FINDINGS: Bilateral There are no suspicious masses, grouped microcalcifications or areas of unexplained architectural distortion  The skin and nipple areolar complex are unremarkable  Numerous stable appearing calcifications noted bilaterally  Biopsy clip on the right  Biopsy clip on the left  Postop changes on left  Impression:  Stable exam ASSESSMENT/BI-RADS CATEGORY:  Overall: 2 - Benign RECOMMENDATION:      - Diagnostic mammogram in 1 year of the breast(s)  Workstation ID: [id number]          Assessment/Plan:  No orders of the defined types were placed in this encounter  Moriah Reddy is a 64y o  year old female who is 2-1/2 years post adjuvant radiation therapy for left breast carcinoma  She is no clinical evidence of recurrence    She remains on anastrozole with good tolerance  She remains active and continues to work  She will return for a follow-up visit in 1 year and she will be seeing Medical and Surgical Oncology in the interim      Fran Jnoes MD  6/20/2022,3:26 PM    Portions of the record may have been created with voice recognition software   Occasional wrong word or "sound a like" substitutions may have occurred due to the inherent limitations of voice recognition software   Read the chart carefully and recognize, using context, where substitutions have occurred

## 2022-07-01 ENCOUNTER — OFFICE VISIT (OUTPATIENT)
Dept: SURGICAL ONCOLOGY | Facility: CLINIC | Age: 77
End: 2022-07-01
Payer: COMMERCIAL

## 2022-07-01 VITALS
OXYGEN SATURATION: 97 % | BODY MASS INDEX: 22.36 KG/M2 | HEIGHT: 64 IN | TEMPERATURE: 97.3 F | RESPIRATION RATE: 17 BRPM | HEART RATE: 84 BPM | DIASTOLIC BLOOD PRESSURE: 78 MMHG | SYSTOLIC BLOOD PRESSURE: 130 MMHG | WEIGHT: 131 LBS

## 2022-07-01 DIAGNOSIS — Z17.0 MALIGNANT NEOPLASM OF UPPER-OUTER QUADRANT OF LEFT BREAST IN FEMALE, ESTROGEN RECEPTOR POSITIVE (HCC): Primary | ICD-10-CM

## 2022-07-01 DIAGNOSIS — C50.412 MALIGNANT NEOPLASM OF UPPER-OUTER QUADRANT OF LEFT BREAST IN FEMALE, ESTROGEN RECEPTOR POSITIVE (HCC): Primary | ICD-10-CM

## 2022-07-01 DIAGNOSIS — Z79.811 USE OF ANASTROZOLE: ICD-10-CM

## 2022-07-01 PROCEDURE — 99214 OFFICE O/P EST MOD 30 MIN: CPT

## 2022-07-01 NOTE — PROGRESS NOTES
Surgical Oncology Follow Up       [address] [facility] [id number]    James Mojica  1945  [id number]  [facility] SURGICAL ONCOLOGY [place]  [place] [id number]    Chief Complaint   Patient presents with    Follow-up       Assessment/Plan:  1  Malignant neoplasm of upper-outer quadrant of left breast in female, estrogen receptor positive ([address] )  - 6 month follow up    2  Use of anastrozole  - continue use per medical oncology    Discussion/Summary:  Patient is a 66-year-old female presenting today for six-month follow-up for left breast cancer diagnosed in May of 2019  Pathology revealed invasive mammary carcinoma ER/%, HER2 fish negative  She underwent genetic testing which was negative  She had a left breast lumpectomy with sentinel node biopsy with Dr Lizet Herrera  She had whole breast radiation therapy and is currently on anastrozole  She had a bilateral diagnostic mammogram on 06/15/2022 which was BI-RADS 2 category 3 density  There were no concerns on her clinical breast exam   Patient does have a large cyst in the middle of her sternum which she states that Dr Lizet Herrera will remove once the patient is ready  I will see the patient back in 6 months or sooner should the need arise  She was instructed to call with any questions or concerns prior to this time  All questions were answered today  History of Present Illness:     Oncology History   Malignant neoplasm of upper-outer quadrant of left breast in female, estrogen receptor positive ([address] )   5/30/2019 Initial Diagnosis    Breast cancer ([address] )     5/30/2019 Biopsy    Left breast, ultrasound-guided biopsy, 200 8cmfn 4 passes 12g Vee:  - Invasive mammary carcinoma of no special type (ductal, not otherwise specified)    - grade 2  - %  - %  - HER2 by FISH negative     6/2019 Genetic Testing BRCA negative     7/8/2019 -  Cancer Staged    Staging form: Breast, AJCC 8th Edition  - Clinical: Stage IB (cT2, cN0, cM0, G2, ER+, WV+, HER2-) - Signed by Nadine Cabral MD on 7/8/2019  Laterality: Left  Method of lymph node assessment: Clinical  Histologic grading system: 3 grade system       7/26/2019 Surgery    Left breast lumpectomy with SLN biopsy:  - Invasive mammary carcinoma, 2 7 cm, grade 2  - approximately 80% of tumor is DCIS  - LN's - one lymph node with metastatic carcinoma, 2 1mm deposit, no extranodal extention  - + LVI  - margins negative     8/12/2019 -  Cancer Staged    Staging form: Breast, AJCC 8th Edition  - Pathologic: Stage IB (pT2, pN1(sn), cM0, G2, ER+, WV+, HER2-) - Signed by Nadine Cabral MD on 8/12/2019  Neoadjuvant therapy: No  Laterality: Left  Method of lymph node assessment: [place] lymph node biopsy  Histologic grading system: 3 grade system       9/25/2019 - 10/23/2019 Radiation    Plan ID Energy Fractions Dose per Fraction (cGy) Dose Correction (cGy) Total Dose Delivered (cGy) Elapsed Days   BH L Breast 6X 16 / 16 266 0 4,256 21   L Brst Boost 12E 5 / 5 200 0 1,000 6      Comply7     9/2019 -  Hormone Therapy    Anastrozole 1 mg daily      Ovarian cancer ([facility] [address] )   2005 Initial Diagnosis    Ovarian cancer [facility])     2005 Surgery    MARCO/BSO    Dr Cristian Amaro     2005 -  Chemotherapy    Dr Cristian Amaro          -Interval History: Patient is a 75-year-old female presenting today for six-month follow-up for left breast cancer diagnosed in May of 2019  Patient denies changes on her breast exam  She denies persistent headaches, bone pain, back pain, shortness of breath, or abdominal pain  Review of Systems:  Review of Systems   Constitutional: Negative for activity change, appetite change, fatigue and unexpected weight change  Respiratory: Negative for cough and shortness of breath  Cardiovascular: Negative for chest pain     Gastrointestinal: Negative for abdominal pain, diarrhea, nausea and vomiting  Endocrine: Negative for heat intolerance  Musculoskeletal: Negative for arthralgias, back pain and myalgias  Skin: Negative for rash  Neurological: Negative for weakness and headaches  Hematological: Negative for adenopathy         Patient Active Problem List   Diagnosis    Malignant neoplasm of upper-outer quadrant of left breast in female, estrogen receptor positive ([facility] [address] )    BRCA negative    Ovarian cancer ([facility] [facility] )    Use of anastrozole    Osteopenia of left hip    Dense breast tissue     Past Medical History:   Diagnosis Date    Basal cell carcinoma     Breast cancer ([facility] ) 2019    Bulbar polio     Cataract     Colon polyp     Curvature of spine     Exercise involving walking     "at work alot"    History of anemia as a child     "after polio"    History of radiation therapy     left breast    Lymphedema     left leg/"after abdominal lymph nodes removed"/wears thigh high compresion stocking"    Macular degeneration     "and slight glaucoma per eye doctor both eyes"    Muscle weakness     "from polio, predominantly right side"    Osteopenia     Osteoporosis     Ovarian cancer ([facility] [address] [facility] )     radical hysterectomy w/ chemo    Scoliosis     with right hip pain occas    Swallowing difficulty     "at times since polio"    Thyroid nodule     Wears glasses      Past Surgical History:   Procedure Laterality Date    ABDOMINAL HYSTERECTOMY N/A     ovarian cancer    BOWEL RESECTION      BREAST BIOPSY Left 2015    benign    BREAST BIOPSY Right 2014    benign    BREAST BIOPSY Left     benign    BREAST BIOPSY Left 2019    [facility]    BREAST LUMPECTOMY Left 2019    Procedure: LUMPECTOMY BREAST NEEDLE LOC at 1100;  Surgeon: Richard Piña MD;  Location: [facility];  Service: Surgical Oncology    CATARACT EXTRACTION Right     CATARACT EXTRACTION Left 2021     SECTION      COLONOSCOPY      HYSTERECTOMY      age 56    LYMPH NODE BIOPSY Left 2019    Procedure: SENTINEL NODE BIOPSY- NUC MED INJ at 1200;  Surgeon: Jon Neves MD;  Location: [facility];  Service: Surgical Oncology    LYMPHADENECTOMY      MAMMO NEEDLE LOCALIZATION LEFT (ALL INC) Left 2019    SKIN CANCER EXCISION      US GUIDED BREAST BIOPSY LEFT COMPLETE Left 2019     Family History   Problem Relation Age of Onset    Heart failure Mother     Breast cancer Mother 78    Hypertension Father     Stroke Father     No Known Problems Brother     No Known Problems Brother     No Known Problems Daughter     No Known Problems Maternal Grandmother     Cancer Maternal Grandfather 61        chest and neck    No Known Problems Paternal Grandmother     Cancer Paternal Grandfather         unknown type and age   Radha Casas No Known Problems Daughter     Stomach cancer Maternal Aunt 46    Colon cancer Maternal Uncle 71    Lung cancer Maternal Aunt 48    No Known Problems Paternal Aunt     No Known Problems Paternal Aunt     No Known Problems Paternal Aunt      Social History     Socioeconomic History    Marital status: /Civil Union     Spouse name: Not on file    Number of children: Not on file    Years of education: Not on file    Highest education level: Not on file   Occupational History    Occupation:    Tobacco Use    Smoking status: Former Smoker     Packs/day: 0 50     Years: 45 00     Pack years: 22 50     Quit date:      Years since quittin 5    Smokeless tobacco: Never Used    Tobacco comment: Quit   Vaping Use    Vaping Use: Not on file   Substance and Sexual Activity    Alcohol use:  Yes     Alcohol/week: 1 0 standard drink     Types: 1 Glasses of wine per week     Comment: occasional    Drug use: No    Sexual activity: Not Currently     Partners: Male     Birth control/protection: Post-menopausal, Surgical     Comment:    Other Topics Concern    Not on file   Social History Narrative    Not on file     Social Determinants of Health     Financial Resource Strain: Not on file   Food Insecurity: Not on file   Transportation Needs: Not on file   Physical Activity: Not on file   Stress: Not on file   Social Connections: Not on file   Intimate Partner Violence: Not on file   Housing Stability: Not on file       Current Outpatient Medications:     anastrozole (ARIMIDEX) 1 mg tablet, Take 1 tablet (1 mg total) by mouth daily, Disp: 90 tablet, Rfl: 3    aspirin 81 MG tablet, Take by mouth, Disp: , Rfl:     Calcium Citrate-Vitamin D 250-200 MG-UNIT TABS, Take by mouth, Disp: , Rfl:     Denosumab (PROLIA SC), Inject under the skin every 6 (six) months, Disp: , Rfl:     HM VITAMIN D3 2000 units CAPS, Take by mouth in the morning, Disp: , Rfl:     multivitamin (THERAGRAN) TABS, Take 1 tablet by mouth daily, Disp: , Rfl:     pyridoxine (VITAMIN B6) 100 mg tablet, Take by mouth, Disp: , Rfl:     spironolactone (ALDACTONE) 50 mg tablet, Take 50 mg by mouth daily, Disp: , Rfl:   Allergies   Allergen Reactions    Metoclopramide Myalgia     Reaction Date: 03Jun2011; Annotation - [id number]: hand cramps and biting of cheeks     Vitals:    07/01/22 1053   BP: 130/78   Pulse: 84   Resp: 17   Temp: (!) 97 3 °F (36 3 °C)   SpO2: 97%       Physical Exam  Constitutional:       General: She is not in acute distress  Appearance: Normal appearance  Cardiovascular:      Rate and Rhythm: Normal rate and regular rhythm  Pulses: Normal pulses  Heart sounds: Normal heart sounds  Pulmonary:      Effort: Pulmonary effort is normal       Breath sounds: Normal breath sounds  Chest:      Chest wall: No mass  Breasts:      Right: No swelling, bleeding, inverted nipple, mass, nipple discharge, skin change, tenderness, axillary adenopathy or supraclavicular adenopathy  Left: No swelling, bleeding, inverted nipple, mass, nipple discharge, skin change, tenderness, axillary adenopathy or supraclavicular adenopathy  Comments: Left breast lumpectomy scar  No masses, nodularity, skin changes, nipple changes or discharge, or adenopathy appreciated on physical exam      Abdominal:      General: Abdomen is flat  Palpations: Abdomen is soft  Lymphadenopathy:      Upper Body:      Right upper body: No supraclavicular, axillary or pectoral adenopathy  Left upper body: No supraclavicular, axillary or pectoral adenopathy  Skin:     General: Skin is warm  Neurological:      General: No focal deficit present  Mental Status: She is alert and oriented to person, place, and time  Psychiatric:         Mood and Affect: Mood normal          Behavior: Behavior normal            Results:    Imaging  Mammo diagnostic bilateral w 3d & cad    Result Date: 6/15/2022  Narrative: DIAGNOSIS: Malignant neoplasm of upper-outer quadrant of left breast in female, estrogen receptor positive ([facility] [address] ) TECHNIQUE: Digital diagnostic mammography was performed  Computer Aided Detection (CAD) analyzed all applicable images  Computer Aided Detection (CAD) analyzed all applicable images  COMPARISONS: Prior breast imaging dated: 06/14/2021, 06/11/2020, 05/30/2019, 05/21/2019, 05/10/2018, 05/10/2018, 05/04/2017, 05/04/2017, 05/10/2016, 05/10/2016, 05/14/2015, 11/24/2014, 06/11/2014, 06/11/2014, 04/23/2014, 04/21/2014, 04/19/2013, and 10/08/2012 RELEVANT HISTORY: Family Breast Cancer History: History of breast cancer in Mother  Family Medical History: Family medical history includes breast cancer in mother and colon cancer in maternal uncle  Personal History: Hormone history includes estrogen replacement therapy and other  Surgical history includes breast biopsy, lumpectomy, and hysterectomy  Medical history includes breast cancer and ovarian cancer  RISK ASSESSMENT: Tyrer-Cuzick risk assessment reporting was suppressed due to the patient's history and/or demographic factors   TISSUE DENSITY: The breasts are heterogeneously dense, which may obscure small masses  INDICATION: Hima Pandya is a 64 y o  female presenting for yearly  FINDINGS: Bilateral There are no suspicious masses, grouped microcalcifications or areas of unexplained architectural distortion  The skin and nipple areolar complex are unremarkable  Numerous stable appearing calcifications noted bilaterally  Biopsy clip on the right  Biopsy clip on the left  Postop changes on left  Impression:  Stable exam ASSESSMENT/BI-RADS CATEGORY:  Overall: 2 - Benign RECOMMENDATION:      - Diagnostic mammogram in 1 year of the breast(s)  Workstation ID: [id number]      I reviewed the above imaging data  Advance Care Planning/Advance Directives:  Discussed disease status, cancer treatment plans and/or cancer treatment goals with the patient

## 2022-08-16 ENCOUNTER — TELEPHONE (OUTPATIENT)
Dept: HEMATOLOGY ONCOLOGY | Facility: CLINIC | Age: 77
End: 2022-08-16

## 2022-08-16 ENCOUNTER — HOSPITAL ENCOUNTER (OUTPATIENT)
Dept: INFUSION CENTER | Facility: CLINIC | Age: 77
Discharge: HOME | End: 2022-08-16
Payer: COMMERCIAL

## 2022-08-16 ENCOUNTER — OFFICE VISIT (OUTPATIENT)
Dept: HEMATOLOGY ONCOLOGY | Facility: CLINIC | Age: 77
End: 2022-08-16
Payer: COMMERCIAL

## 2022-08-16 VITALS
HEIGHT: 62 IN | SYSTOLIC BLOOD PRESSURE: 132 MMHG | HEART RATE: 80 BPM | RESPIRATION RATE: 18 BRPM | BODY MASS INDEX: 24.49 KG/M2 | DIASTOLIC BLOOD PRESSURE: 66 MMHG | TEMPERATURE: 96.9 F | WEIGHT: 133.1 LBS | OXYGEN SATURATION: 96 %

## 2022-08-16 DIAGNOSIS — C50.412 MALIGNANT NEOPLASM OF UPPER-OUTER QUADRANT OF LEFT BREAST IN FEMALE, ESTROGEN RECEPTOR POSITIVE (HCC): Primary | ICD-10-CM

## 2022-08-16 DIAGNOSIS — M85.852 OSTEOPENIA OF LEFT HIP: ICD-10-CM

## 2022-08-16 DIAGNOSIS — Z17.0 MALIGNANT NEOPLASM OF UPPER-OUTER QUADRANT OF LEFT BREAST IN FEMALE, ESTROGEN RECEPTOR POSITIVE (HCC): Primary | ICD-10-CM

## 2022-08-16 PROCEDURE — 99214 OFFICE O/P EST MOD 30 MIN: CPT | Performed by: PHYSICIAN ASSISTANT

## 2022-08-16 PROCEDURE — 96372 THER/PROPH/DIAG INJ SC/IM: CPT

## 2022-08-16 RX ORDER — ANASTROZOLE 1 MG/1
1 TABLET ORAL DAILY
Qty: 90 TABLET | Refills: 3 | Status: SHIPPED | OUTPATIENT
Start: 2022-08-16

## 2022-08-16 RX ADMIN — DENOSUMAB 60 MG: 60 INJECTION SUBCUTANEOUS at 13:53

## 2022-08-16 NOTE — PROGRESS NOTES
Hematology/Oncology Outpatient Follow-up  Yousuf Hernandez 64 y o  female 1945 [id number]    Date:  8/16/2022      Assessment and Plan:  1  Malignant neoplasm of upper-outer quadrant of left breast in female, estrogen receptor positive [facility])  68-year-old female presents for follow-up regarding history of breast cancer diagnosed in 2019  She remains on adjuvant anastrozole  She tolerates this well  She was found to have osteopenia in due to being on this she is now receiving Prolia and tolerates well  She is due for dose today  Labs reviewed in are normal   She is to continue calcium and vitamin-D   PCP check vitamin-D and is adequate  Follow-up in 6 months with labs  She asked regarding how long she will be on anastrozole, minimum 5 years  - CBC and differential; Future  - Comprehensive metabolic panel; Future  - Cancer antigen 27 29; Future  - anastrozole (ARIMIDEX) 1 mg tablet; Take 1 tablet (1 mg total) by mouth daily  Dispense: 90 tablet; Refill: 3    HPI:  Oncology History   Malignant neoplasm of upper-outer quadrant of left breast in female, estrogen receptor positive ([facility] [address] )   5/30/2019 Initial Diagnosis    Breast cancer ([facility] [address] )     5/30/2019 Biopsy    Left breast, ultrasound-guided biopsy, 200 8cmfn 4 passes 12g Marquee:  - Invasive mammary carcinoma of no special type (ductal, not otherwise specified)    - grade 2  - %  - %  - HER2 by FISH negative     6/2019 Genetic Testing    BRCA negative     7/8/2019 -  Cancer Staged    Staging form: Breast, AJCC 8th Edition  - Clinical: Stage IB (cT2, cN0, cM0, G2, ER+, SD+, HER2-) - Signed by Tico Mendez MD on 7/8/2019  Laterality: Left  Method of lymph node assessment: Clinical  Histologic grading system: 3 grade system       7/26/2019 Surgery    Left breast lumpectomy with SLN biopsy:  - Invasive mammary carcinoma, 2 7 cm, grade 2  - approximately 80% of tumor is DCIS  - LN's - one lymph node with metastatic carcinoma, 2 1mm deposit, no extranodal extention  - + LVI  - margins negative     8/12/2019 -  Cancer Staged    Staging form: Breast, AJCC 8th Edition  - Pathologic: Stage IB (pT2, pN1(sn), cM0, G2, ER+, SC+, HER2-) - Signed by Ramin Garcia MD on 8/12/2019  Neoadjuvant therapy: No  Laterality: Left  Method of lymph node assessment: [place] lymph node biopsy  Histologic grading system: 3 grade system       9/25/2019 - 10/23/2019 Radiation    Plan ID Energy Fractions Dose per Fraction (cGy) Dose Correction (cGy) Total Dose Delivered (cGy) Elapsed Days   BH L Breast 6X 16 / 16 266 0 4,256 21   L Brst Boost 12E 5 / 5 200 0 1,000 6     Dr Sarai Arriola     9/2019 -  Hormone Therapy    Anastrozole 1 mg daily      Ovarian cancer ([facility] [address] )   2005 Initial Diagnosis    Ovarian cancer [facility])     2005 Surgery    MARCO/BSO    Dr Mayte Mcgrath     2005 -  Chemotherapy    Dr Francoise Franklin female (ECOG 0) with history of invasive mammary carcinoma who is status post lumpectomy and sentinel lymph node sampling  Patient's tumor measured 2 7 cm, grade 2 of 3   Approximately 80% of tumor is DCIS       3 sentinel lymph nodes were removed   1 had focus of metastatic carcinoma measuring 2 1 mm; however negative for extranodal extension      Patient's anatomic stage is at least stage IIB, T2, N1 a, C M0, G2   %, %, her 2-by FISH     She had postoperative complications with hematoma formation for which she has been following Surgical Oncology  Le Chavez states that she continues to apply warm compresses to the area and it is improving   She has follow-up with Surgical Oncology for routine visit in November 2019       Following surgery patient was noted to have very slight invasion into 1 lymph node, stage IIB   She was offered chemotherapy   He was to proceed with aromatase inhibitor alone      She is on anastrozole 1 mg       Patient has had genetic testing in the past due to history of ovarian cancer and is negative      She has been on Prolia 60 mg every 6 months with first dose on 1/13/20  Interval history:    ROS: Review of Systems   Constitutional: Positive for fatigue (mild)  Negative for appetite change, chills, fever and unexpected weight change  Respiratory: Negative for cough and shortness of breath  Cardiovascular: Negative for chest pain and palpitations  Gastrointestinal: Negative for abdominal pain, constipation, diarrhea, nausea and vomiting  Endocrine:        Occassionally hot flashes     Genitourinary: Negative for difficulty urinating, dysuria and hematuria  Musculoskeletal: Positive for arthralgias (chronic, unchanged, specifically in right hip and right leg/shin)  Skin: Negative  Neurological: Negative for dizziness, weakness, light-headedness, numbness and headaches  Hematological: Negative  Psychiatric/Behavioral: Negative          Past Medical History:   Diagnosis Date    Basal cell carcinoma     Breast cancer ([facility] [address] ) 07/26/2019    Bulbar polio     Cataract     Colon polyp     Curvature of spine     Exercise involving walking     "at work alot"    History of anemia as a child     "after polio"    History of radiation therapy 2019    left breast    Lymphedema     left leg/"after abdominal lymph nodes removed"/wears thigh high compresion stocking"    Macular degeneration     "and slight glaucoma per eye doctor both eyes"    Muscle weakness     "from polio, predominantly right side"    Osteopenia     Osteoporosis     Ovarian cancer ([facility] [address] ) 2005    radical hysterectomy w/ chemo    Scoliosis     with right hip pain occas    Swallowing difficulty     "at times since polio"    Thyroid nodule     Wears glasses        Past Surgical History:   Procedure Laterality Date    ABDOMINAL HYSTERECTOMY N/A     ovarian cancer    BOWEL RESECTION      BREAST BIOPSY Left 01/12/2015    benign    BREAST BIOPSY Right 06/11/2014    benign    BREAST BIOPSY Left 2007    benign    BREAST BIOPSY Left 05/30/2019    [facility]    BREAST LUMPECTOMY Left 2019    Procedure: LUMPECTOMY BREAST NEEDLE LOC at 1100;  Surgeon: Marc Chavira MD;  Location: [facility];  Service: Surgical Oncology    CATARACT EXTRACTION Right     CATARACT EXTRACTION Left 2021     SECTION      COLONOSCOPY      HYSTERECTOMY      age 56    LYMPH NODE BIOPSY Left 2019    Procedure: SENTINEL NODE BIOPSY- NUC MED INJ at 1200;  Surgeon: Marc Chavira MD;  [facility]: [facility];  Service: Surgical Oncology    LYMPHADENECTOMY      MAMMO NEEDLE LOCALIZATION LEFT (ALL INC) Left 2019    SKIN CANCER EXCISION      US GUIDED BREAST BIOPSY LEFT COMPLETE Left 2019       Social History     Socioeconomic History    Marital status: /Civil Union     Spouse name: Not on file    Number of children: Not on file    Years of education: Not on file    Highest education level: Not on file   Occupational History    Occupation:    Tobacco Use    Smoking status: Former Smoker     Packs/day: 0 50     Years: 45 00     Pack years: 22 50     Quit date:      Years since quittin 6    Smokeless tobacco: Never Used    Tobacco comment: Quit   Vaping Use    Vaping Use: Not on file   Substance and Sexual Activity    Alcohol use:  Yes     Alcohol/week: 1 0 standard drink     Types: 1 Glasses of wine per week     Comment: occasional    Drug use: No    Sexual activity: Not Currently     Partners: Male     Birth control/protection: Post-menopausal, Surgical     Comment:    Other Topics Concern    Not on file   Social History Narrative    Not on file     Social Determinants of Health     Financial Resource Strain: Not on file   Food Insecurity: Not on file   Transportation Needs: Not on file   Physical Activity: Not on file   Stress: Not on file   Social Connections: Not on file   Intimate Partner Violence: Not on file   Housing Stability: Not on file       Family History   Problem Relation Age of Onset    Heart failure Mother  Breast cancer Mother 78    Hypertension Father     Stroke Father     No Known Problems Brother     No Known Problems Brother     No Known Problems Daughter     No Known Problems Maternal Grandmother     Cancer Maternal Grandfather 64        chest and neck    No Known Problems Paternal Grandmother     Cancer Paternal Grandfather         unknown type and age   Shakira Clunes No Known Problems Daughter     Stomach cancer Maternal Aunt 46    Colon cancer Maternal Uncle 71    Lung cancer Maternal Aunt 48    No Known Problems Paternal Aunt     No Known Problems Paternal Aunt     No Known Problems Paternal Aunt        Allergies   Allergen Reactions    Metoclopramide Myalgia     Reaction Date: 03Jun2011; Annotation - [id number]: hand cramps and biting of cheeks         Current Outpatient Medications:     anastrozole (ARIMIDEX) 1 mg tablet, Take 1 tablet (1 mg total) by mouth daily, Disp: 90 tablet, Rfl: 3    aspirin 81 MG tablet, Take by mouth, Disp: , Rfl:     Calcium Citrate-Vitamin D 250-200 MG-UNIT TABS, Take by mouth, Disp: , Rfl:     Denosumab (PROLIA SC), Inject under the skin every 6 (six) months, Disp: , Rfl:     HM VITAMIN D3 2000 units CAPS, Take by mouth in the morning, Disp: , Rfl:     multivitamin (THERAGRAN) TABS, Take 1 tablet by mouth daily, Disp: , Rfl:     pyridoxine (VITAMIN B6) 100 mg tablet, Take by mouth, Disp: , Rfl:     spironolactone (ALDACTONE) 50 mg tablet, Take 50 mg by mouth daily, Disp: , Rfl:       Physical Exam:  There were no vitals taken for this visit  Physical Exam  Vitals reviewed  Constitutional:       General: She is not in acute distress  Appearance: She is well-developed  HENT:      Head: Normocephalic and atraumatic  Eyes:      General: No scleral icterus  Conjunctiva/sclera: Conjunctivae normal    Cardiovascular:      Rate and Rhythm: Normal rate and regular rhythm  Heart sounds: Normal heart sounds  No murmur heard    Pulmonary:      Effort: Pulmonary effort is normal  No respiratory distress  Breath sounds: Normal breath sounds  Chest:   Breasts:      Right: No axillary adenopathy or supraclavicular adenopathy  Left: No axillary adenopathy or supraclavicular adenopathy  Abdominal:      Palpations: Abdomen is soft  Tenderness: There is no abdominal tenderness  Musculoskeletal:         General: No tenderness  Normal range of motion  Cervical back: Normal range of motion and neck supple  Right lower leg: No edema  Left lower leg: No edema  Lymphadenopathy:      Cervical: No cervical adenopathy  Upper Body:      Right upper body: No supraclavicular or axillary adenopathy  Left upper body: No supraclavicular or axillary adenopathy  Skin:     General: Skin is warm and dry  Neurological:      Mental Status: She is alert and oriented to person, place, and time  Cranial Nerves: No cranial nerve deficit  Psychiatric:         Mood and Affect: Mood normal          Behavior: Behavior normal        Labs:  Lab Results   Component Value Date    WBC 6 50 10/07/2019    HGB 13 4 10/07/2019    HCT 40 8 10/07/2019    MCV 97 10/07/2019     10/07/2019     Lab Results   Component Value Date    K 4 6 07/13/2020    CL 99 07/13/2020    CO2 34 (H) 07/13/2020    BUN 25 07/13/2020    CREATININE 1 10 07/13/2020    CALCIUM 10 1 07/13/2020    AST 41 07/13/2020    ALT 32 07/13/2020    ALKPHOS 56 07/13/2020    EGFR 49 07/13/2020     Patient voiced understanding and agreement in the above discussion  Aware to contact our office with questions/symptoms in the interim  This note has been generated by voice recognition software system  Therefore, there may be spelling, grammar, and or syntax errors  Please contact if questions arise

## 2022-08-16 NOTE — TELEPHONE ENCOUNTER
Would you be able to schedule prolia for patient at the [facility] for 2/15/2023 at 1:30pm?    Thanks!

## 2022-08-16 NOTE — PROGRESS NOTES
Pt  Denies new symptoms or concerns today  Labs reviewed  Ca+ 9 8  CrCl 42 6  Prolia 60 mg given SQ  Pt  Tolerated well  Future appointment scheduled for 6 months as ordered

## 2022-09-25 DIAGNOSIS — M85.852 OSTEOPENIA OF LEFT HIP: Primary | ICD-10-CM

## 2022-09-25 DIAGNOSIS — Z17.0 MALIGNANT NEOPLASM OF UPPER-OUTER QUADRANT OF LEFT BREAST IN FEMALE, ESTROGEN RECEPTOR POSITIVE (HCC): ICD-10-CM

## 2022-09-25 DIAGNOSIS — C50.412 MALIGNANT NEOPLASM OF UPPER-OUTER QUADRANT OF LEFT BREAST IN FEMALE, ESTROGEN RECEPTOR POSITIVE (HCC): ICD-10-CM

## 2022-11-21 ENCOUNTER — TELEPHONE (OUTPATIENT)
Dept: SURGICAL ONCOLOGY | Facility: CLINIC | Age: 77
End: 2022-11-21

## 2022-11-21 NOTE — TELEPHONE ENCOUNTER
Call out to patient to let her know her appointment with Hoda Guerra needs to be rescheduled  Provided new date and time for patient  Patient acknowledged

## 2022-12-27 ENCOUNTER — TELEPHONE (OUTPATIENT)
Dept: HEMATOLOGY ONCOLOGY | Facility: CLINIC | Age: 77
End: 2022-12-27

## 2022-12-27 DIAGNOSIS — C50.412 MALIGNANT NEOPLASM OF UPPER-OUTER QUADRANT OF LEFT BREAST IN FEMALE, ESTROGEN RECEPTOR POSITIVE (HCC): ICD-10-CM

## 2022-12-27 DIAGNOSIS — Z17.0 MALIGNANT NEOPLASM OF UPPER-OUTER QUADRANT OF LEFT BREAST IN FEMALE, ESTROGEN RECEPTOR POSITIVE (HCC): ICD-10-CM

## 2022-12-27 RX ORDER — ANASTROZOLE 1 MG/1
1 TABLET ORAL DAILY
Qty: 90 TABLET | Refills: 3 | Status: SHIPPED | OUTPATIENT
Start: 2022-12-27

## 2023-01-06 ENCOUNTER — OFFICE VISIT (OUTPATIENT)
Dept: SURGICAL ONCOLOGY | Facility: CLINIC | Age: 78
End: 2023-01-06

## 2023-01-06 VITALS
OXYGEN SATURATION: 95 % | SYSTOLIC BLOOD PRESSURE: 120 MMHG | DIASTOLIC BLOOD PRESSURE: 64 MMHG | HEART RATE: 90 BPM | TEMPERATURE: 98.2 F | HEIGHT: 62 IN | WEIGHT: 127 LBS | RESPIRATION RATE: 16 BRPM | BODY MASS INDEX: 23.37 KG/M2

## 2023-01-06 DIAGNOSIS — C50.412 MALIGNANT NEOPLASM OF UPPER-OUTER QUADRANT OF LEFT BREAST IN FEMALE, ESTROGEN RECEPTOR POSITIVE (HCC): Primary | ICD-10-CM

## 2023-01-06 DIAGNOSIS — Z79.811 USE OF ANASTROZOLE: ICD-10-CM

## 2023-01-06 DIAGNOSIS — Z17.0 MALIGNANT NEOPLASM OF UPPER-OUTER QUADRANT OF LEFT BREAST IN FEMALE, ESTROGEN RECEPTOR POSITIVE (HCC): Primary | ICD-10-CM

## 2023-01-06 NOTE — PROGRESS NOTES
Surgical Oncology Follow Up       [address] [facility] SURGICAL ONCOLOGY [facility] [place] [address]    Cesar Mendoza  1945  [id number]  [facility] SURGICAL ONCOLOGY [place]  [place] [address]    Chief Complaint   Patient presents with   • Follow-up       Assessment/Plan:  1  Malignant neoplasm of upper-outer quadrant of left breast in female, estrogen receptor positive ([facility] [address] )  - 6 month follow up  - Mammo diagnostic bilateral w 3d & cad; Future    2  Use of anastrozole  - continue use per medical oncology    Discussion/Summary: Patient is a 75-year-old female presenting today for six-month follow-up for left breast cancer diagnosed in May of 2019  Pathology revealed invasive mammary carcinoma ER/%, HER2 fish negative  She underwent genetic testing which was negative  She had a left breast lumpectomy with sentinel node biopsy with Dr Nadira Loyola  She had whole breast radiation therapy and is currently on anastrozole  She will be due for a mammogram in June  There were no concerns on her cbe  I will see the patient back in 6 months or sooner should the need arise  She was instructed to call with any questions or concerns prior to this time  All questions were answered today  History of Present Illness:     Oncology History   Malignant neoplasm of upper-outer quadrant of left breast in female, estrogen receptor positive ([facility] [address] )   5/30/2019 Initial Diagnosis    Breast cancer ([facility] [address] )     5/30/2019 Biopsy    Left breast, ultrasound-guided biopsy, 200 8cmfn 4 passes 12g Marquee:  - Invasive mammary carcinoma of no special type (ductal, not otherwise specified)    - grade 2  - %  - %  - HER2 by FISH negative     6/2019 Genetic Testing    BRCA negative     7/8/2019 -  Cancer Staged    Staging form: Breast, AJCC 8th Edition  - Clinical: Stage IB (cT2, cN0, cM0, G2, ER+, CO+, HER2-) - Signed by Sabiha Back MD on 7/8/2019  Laterality: Left  Method of lymph node assessment: Clinical  Histologic grading system: 3 grade system       7/26/2019 Surgery    Left breast lumpectomy with SLN biopsy:  - Invasive mammary carcinoma, 2 7 cm, grade 2  - approximately 80% of tumor is DCIS  - LN's - one lymph node with metastatic carcinoma, 2 1mm deposit, no extranodal extention  - + LVI  - margins negative     8/12/2019 -  Cancer Staged    Staging form: Breast, AJCC 8th Edition  - Pathologic: Stage IB (pT2, pN1(sn), cM0, G2, ER+, RI+, HER2-) - Signed by Sabiha Back MD on 8/12/2019  Neoadjuvant therapy: No  Laterality: Left  Method of lymph node assessment: [place] lymph node biopsy  Histologic grading system: 3 grade system       9/25/2019 - 10/23/2019 Radiation    Plan ID Energy Fractions Dose per Fraction (cGy) Dose Correction (cGy) Total Dose Delivered (cGy) Elapsed Days   BH L Breast 6X 16 / 16 266 0 4,256 21   L Brst Boost 12E 5 / 5 200 0 1,000 6     Dr Benedicto Hoover     9/2019 -  Hormone Therapy    Anastrozole 1 mg daily      Ovarian cancer ([address] )   2005 Initial Diagnosis    Ovarian cancer [facility])     2005 Surgery    MARCO/BSO    Dr Thomas Garcia     2005 -  Chemotherapy    Dr Thomas Garcia          -Interval History: Patient is a 66-year-old female presenting today for six-month follow-up for left breast cancer diagnosed in May of 2019  She is currently on anastrozole  Patient denies changes on her breast exam  She denies persistent headaches, bone pain, back pain, shortness of breath, or abdominal pain  Review of Systems:  Review of Systems   Constitutional: Negative for activity change, appetite change, fatigue and unexpected weight change  Respiratory: Negative for cough and shortness of breath  Cardiovascular: Negative for chest pain  Gastrointestinal: Negative for abdominal pain, diarrhea, nausea and vomiting  Endocrine: Negative for heat intolerance     Musculoskeletal: Negative for arthralgias, back pain and myalgias  Skin: Negative for rash  Neurological: Negative for weakness and headaches  Hematological: Negative for adenopathy         Patient Active Problem List   Diagnosis   • Malignant neoplasm of upper-outer quadrant of left breast in female, estrogen receptor positive ([facility] [address] )   • BRCA negative   • Ovarian cancer ([facility] [address] )   • Use of anastrozole   • Osteopenia of left hip   • Dense breast tissue     Past Medical History:   Diagnosis Date   • Basal cell carcinoma    • Breast cancer ([facility] [address] ) 2019   • Bulbar polio    • Cataract    • Colon polyp    • Curvature of spine    • Exercise involving walking     "at work alot"   • History of anemia as a child     "after polio"   • History of radiation therapy 2019    left breast   • Lymphedema     left leg/"after abdominal lymph nodes removed"/wears thigh high compresion stocking"   • Macular degeneration     "and slight glaucoma per eye doctor both eyes"   • Muscle weakness     "from polio, predominantly right side"   • Osteopenia    • Osteoporosis    • [facility] 2005    radical hysterectomy w/ chemo   • Scoliosis     with right hip pain occas   • Swallowing difficulty     "at times since polio"   • Thyroid nodule    • Wears glasses      Past Surgical History:   Procedure Laterality Date   • ABDOMINAL HYSTERECTOMY N/A     ovarian cancer   • BOWEL RESECTION     • BREAST BIOPSY Left 2015    benign   • BREAST BIOPSY Right 2014    benign   • BREAST BIOPSY Left     benign   • BREAST BIOPSY Left 2019    [facility]   • BREAST LUMPECTOMY Left 2019    Procedure: LUMPECTOMY BREAST NEEDLE LOC at 1100;  Surgeon: Nadine Cabral MD;  Location: [facility] OR;  Service: Surgical Oncology   • CATARACT EXTRACTION Right    • CATARACT EXTRACTION Left 2021   •  SECTION     • COLONOSCOPY     • HYSTERECTOMY      age 56   • LYMPH NODE BIOPSY Left 2019    Procedure: SENTINEL NODE BIOPSY- NUC MED INJ at 1200;  Surgeon: Nadine Cabral MD;  [facility];  Service: Surgical Oncology   • LYMPHADENECTOMY     • MAMMO NEEDLE LOCALIZATION LEFT (ALL INC) Left 2019   • SKIN CANCER EXCISION     • US GUIDED BREAST BIOPSY LEFT COMPLETE Left 2019     Family History   Problem Relation Age of Onset   • Heart failure Mother    • Breast cancer Mother 78   • Hypertension Father    • Stroke Father    • No Known Problems Brother    • No Known Problems Brother    • No Known Problems Daughter    • No Known Problems Maternal Grandmother    • Cancer Maternal Grandfather 64        chest and neck   • No Known Problems Paternal Grandmother    • Cancer Paternal Grandfather         unknown type and age   • No Known Problems Daughter    • Stomach cancer Maternal Aunt 46   • Colon cancer Maternal Uncle 69   • Lung cancer Maternal Aunt 50   • No Known Problems Paternal Aunt    • No Known Problems Paternal Aunt    • No Known Problems Paternal Aunt      Social History     Socioeconomic History   • Marital status: /Civil Union     Spouse name: Not on file   • Number of children: Not on file   • Years of education: Not on file   • Highest education level: Not on file   Occupational History   • Occupation:    Tobacco Use   • Smoking status: Former     Packs/day: 0 50     Years: 45 00     Pack years: 22 50     Types: Cigarettes     Quit date:      Years since quittin 0   • Smokeless tobacco: Never   • Tobacco comments:     Quit   Vaping Use   • Vaping Use: Not on file   Substance and Sexual Activity   • Alcohol use:  Yes     Alcohol/week: 1 0 standard drink     Types: 1 Glasses of wine per week     Comment: occasional   • Drug use: No   • Sexual activity: Not Currently     Partners: Male     Birth control/protection: Post-menopausal, Surgical     Comment:    Other Topics Concern   • Not on file   Social History Narrative   • Not on file     Social Determinants of Health     Financial Resource Strain: Not on file   Food Insecurity: Not on file   Transportation Needs: Not on file   Physical Activity: Not on file   Stress: Not on file   Social Connections: Not on file   Intimate Partner Violence: Not on file   Housing Stability: Not on file       Current Outpatient Medications:   •  anastrozole (ARIMIDEX) 1 mg tablet, Take 1 tablet (1 mg total) by mouth daily, Disp: 90 tablet, Rfl: 3  •  aspirin 81 MG tablet, Take by mouth, Disp: , Rfl:   •  Calcium Citrate-Vitamin D 250-200 MG-UNIT TABS, Take by mouth, Disp: , Rfl:   •  Denosumab (PROLIA SC), Inject under the skin every 6 (six) months, Disp: , Rfl:   •  HM VITAMIN D3 2000 units CAPS, Take by mouth in the morning, Disp: , Rfl:   •  multivitamin (THERAGRAN) TABS, Take 1 tablet by mouth daily, Disp: , Rfl:   •  pyridoxine (VITAMIN B6) 100 mg tablet, Take by mouth, Disp: , Rfl:   •  spironolactone (ALDACTONE) 50 mg tablet, Take 50 mg by mouth daily, Disp: , Rfl:   Allergies   Allergen Reactions   • Metoclopramide Myalgia     Reaction Date: 03Jun2011; Annotation - [id number]: hand cramps and biting of cheeks     Vitals:    01/06/23 0823   BP: 120/64   Pulse: 90   Resp: 16   Temp: 98 2 °F (36 8 °C)   SpO2: 95%       Physical Exam  Constitutional:       General: She is not in acute distress  Appearance: Normal appearance  Cardiovascular:      Rate and Rhythm: Normal rate and regular rhythm  Pulses: Normal pulses  Heart sounds: Normal heart sounds  Pulmonary:      Effort: Pulmonary effort is normal       Breath sounds: Normal breath sounds  Chest:      Chest wall: No mass  Breasts:     Right: No swelling, bleeding, inverted nipple, mass, nipple discharge, skin change or tenderness  Left: No swelling, bleeding, inverted nipple, mass, nipple discharge, skin change or tenderness  Comments: Left breast lumpectomy scar with sln bx   No masses, nodularity, skin changes, nipple changes or discharge, or adenopathy appreciated on physical exam      Abdominal:      General: Abdomen is flat  Palpations: Abdomen is soft  Lymphadenopathy:      Upper Body:      Right upper body: No supraclavicular, axillary or pectoral adenopathy  Left upper body: No supraclavicular, axillary or pectoral adenopathy  Skin:     General: Skin is warm  Neurological:      General: No focal deficit present  Mental Status: She is alert and oriented to person, place, and time  Psychiatric:         Mood and Affect: Mood normal          Behavior: Behavior normal            Results:    Imaging  No results found  I reviewed the above imaging data  Advance Care Planning/Advance Directives:  Discussed disease status, cancer treatment plans and/or cancer treatment goals with the patient

## 2023-02-15 ENCOUNTER — HOSPITAL ENCOUNTER (OUTPATIENT)
Dept: INFUSION CENTER | Facility: CLINIC | Age: 78
Discharge: HOME | End: 2023-02-15

## 2023-02-15 ENCOUNTER — OFFICE VISIT (OUTPATIENT)
Dept: HEMATOLOGY ONCOLOGY | Facility: CLINIC | Age: 78
End: 2023-02-15

## 2023-02-15 VITALS
OXYGEN SATURATION: 95 % | SYSTOLIC BLOOD PRESSURE: 122 MMHG | WEIGHT: 131 LBS | HEIGHT: 62 IN | BODY MASS INDEX: 24.11 KG/M2 | HEART RATE: 90 BPM | RESPIRATION RATE: 18 BRPM | DIASTOLIC BLOOD PRESSURE: 78 MMHG | TEMPERATURE: 96.9 F

## 2023-02-15 DIAGNOSIS — M85.852 OSTEOPENIA OF LEFT HIP: Primary | ICD-10-CM

## 2023-02-15 DIAGNOSIS — C50.412 MALIGNANT NEOPLASM OF UPPER-OUTER QUADRANT OF LEFT BREAST IN FEMALE, ESTROGEN RECEPTOR POSITIVE (HCC): ICD-10-CM

## 2023-02-15 DIAGNOSIS — C50.412 MALIGNANT NEOPLASM OF UPPER-OUTER QUADRANT OF LEFT BREAST IN FEMALE, ESTROGEN RECEPTOR POSITIVE (HCC): Primary | ICD-10-CM

## 2023-02-15 DIAGNOSIS — Z17.0 MALIGNANT NEOPLASM OF UPPER-OUTER QUADRANT OF LEFT BREAST IN FEMALE, ESTROGEN RECEPTOR POSITIVE (HCC): ICD-10-CM

## 2023-02-15 DIAGNOSIS — Z17.0 MALIGNANT NEOPLASM OF UPPER-OUTER QUADRANT OF LEFT BREAST IN FEMALE, ESTROGEN RECEPTOR POSITIVE (HCC): Primary | ICD-10-CM

## 2023-02-15 RX ADMIN — DENOSUMAB 60 MG: 60 INJECTION SUBCUTANEOUS at 13:37

## 2023-02-15 NOTE — PROGRESS NOTES
Pt arrived to unit without complaint  CrCL=47 5  Pt tolerated Prolia injection in left arm without incident  Appt card provided  Pt left unit in stable condition

## 2023-02-15 NOTE — PROGRESS NOTES
Hematology/Oncology Outpatient Follow-up  Arlet Wilcox 76 y o  female 1945 [id number]    Date:  2/15/2023    Assessment and Plan:  1  Malignant neoplasm of upper-outer quadrant of left breast in female, estrogen receptor positive [facility])  68-year-old female presents for follow-up regarding history of breast cancer diagnosed in 2019  She remains on adjuvant anastrozole  She tolerates this well  She is on Prolia secondary to osteopenia in the setting of anastrozole  She will be due for repeat DEXA scan in November 2023  We will order at next visit  She continues on calcium and vitamin D  She remains active  We reviewed her labs which were normal     Follow-up 6 months     - CBC and differential; Future  - Comprehensive metabolic panel; Future  - Cancer antigen 27 29; Future    HPI:  Oncology History   Malignant neoplasm of upper-outer quadrant of left breast in female, estrogen receptor positive ([facility] [address] )   5/30/2019 Initial Diagnosis    Breast cancer ([facility] [address] )     5/30/2019 Biopsy    Left breast, ultrasound-guided biopsy, 200 8cmfn 4 passes 12g Marquee:  - Invasive mammary carcinoma of no special type (ductal, not otherwise specified)    - grade 2  - %  - %  - HER2 by FISH negative     6/2019 Genetic Testing    BRCA negative     7/8/2019 -  Cancer Staged    Staging form: Breast, AJCC 8th Edition  - Clinical: Stage IB (cT2, cN0, cM0, G2, ER+, FL+, HER2-) - Signed by Winnie Leigh MD on 7/8/2019  Laterality: Left  Method of lymph node assessment: Clinical  Histologic grading system: 3 grade system       7/26/2019 Surgery    Left breast lumpectomy with SLN biopsy:  - Invasive mammary carcinoma, 2 7 cm, grade 2  - approximately 80% of tumor is DCIS  - LN's - one lymph node with metastatic carcinoma, 2 1mm deposit, no extranodal extention  - + LVI  - margins negative     8/12/2019 -  Cancer Staged    Staging form: Breast, AJCC 8th Edition  - Pathologic: Stage IB (pT2, pN1(sn), cM0, G2, ER+, MA+, HER2-) - Signed by Marichuy Solorzano MD on 8/12/2019  Neoadjuvant therapy: No  Laterality: Left  Method of lymph node assessment: [place] lymph node biopsy  Histologic grading system: 3 grade system       9/25/2019 - 10/23/2019 Radiation    Plan ID Energy Fractions Dose per Fraction (cGy) Dose Correction (cGy) Total Dose Delivered (cGy) Elapsed Days   BH L Breast 6X 16 / 16 266 0 4,256 21   L Brst Boost 12E 5 / 5 200 0 1,000 6     Dr Star Ness     9/2019 -  Hormone Therapy    Anastrozole 1 mg daily      Ovarian cancer ([facility] [address] )   2005 Initial Diagnosis    Ovarian cancer [facility])     2005 Surgery    MARCO/BSO    Dr Dot Gibbons     2005 -  Chemotherapy    Dr Dot Gibbons       76-year-old female (ECOG 0) with history of invasive mammary carcinoma who is status post lumpectomy and sentinel lymph node sampling  Patient's tumor measured 2 7 cm, grade 2 of 3   Approximately 80% of tumor is DCIS     3 sentinel lymph nodes were removed   1 had focus of metastatic carcinoma measuring 2 1 mm; however negative for extranodal extension      Patient's anatomic stage is at least stage IIB, T2, N1 a, C M0, G2   %, %, her 2-by FISH     She had postoperative complications with hematoma formation for which she has been following Surgical Oncology  Vania [place] states that she continues to apply warm compresses to the area and it is improving   She has follow-up with Surgical Oncology for routine visit in November 2019       Following surgery patient was noted to have very slight invasion into 1 lymph node, stage IIB   She was offered chemotherapy   He was to proceed with aromatase inhibitor alone      She is on anastrozole 1 mg       Patient has had genetic testing in the past due to history of ovarian cancer and is negative       She has been on Prolia 60 mg every 6 months with first dose on 1/13/20       Interval history: no new complaints, is going to be retiring in 2 weeks     ROS: Review of Systems   Constitutional: Negative for activity change, appetite change, chills, fatigue, fever and unexpected weight change  Respiratory: Negative for cough and shortness of breath  Cardiovascular: Negative for chest pain, palpitations and leg swelling  Gastrointestinal: Negative for abdominal pain, constipation, diarrhea, nausea and vomiting  Genitourinary: Negative for difficulty urinating, dysuria and hematuria  Musculoskeletal: Negative for arthralgias  Skin: Negative  Neurological: Negative for dizziness, weakness, light-headedness, numbness and headaches  Hematological: Negative  Psychiatric/Behavioral: Negative        Past Medical History:   Diagnosis Date   • Basal cell carcinoma    • Breast cancer ([facility] ) 07/26/2019   • Bulbar polio    • Cataract    • Colon polyp    • Curvature of spine    • Exercise involving walking     "at work alot"   • History of anemia as a child     "after polio"   • History of radiation therapy 2019    left breast   • Lymphedema     left leg/"after abdominal lymph nodes removed"/wears thigh high compresion stocking"   • Macular degeneration     "and slight glaucoma per eye doctor both eyes"   • Muscle weakness     "from polio, predominantly right side"   • Osteopenia    • Osteoporosis    • Ovarian cancer ([facility] ) 2005    radical hysterectomy w/ chemo   • Scoliosis     with right hip pain occas   • Swallowing difficulty     "at times since polio"   • Thyroid nodule    • Wears glasses        Past Surgical History:   Procedure Laterality Date   • ABDOMINAL HYSTERECTOMY N/A     ovarian cancer   • BOWEL RESECTION     • BREAST BIOPSY Left 01/12/2015    benign   • BREAST BIOPSY Right 06/11/2014    benign   • BREAST BIOPSY Left 2007    benign   • BREAST BIOPSY Left 05/30/2019    IDC   • BREAST LUMPECTOMY Left 07/26/2019    Procedure: LUMPECTOMY BREAST NEEDLE LOC at 1100;  Surgeon: Tabitha Perez MD;  Location: [facility];  Service: Surgical Oncology   • CATARACT EXTRACTION Right 2008   • CATARACT EXTRACTION Left 02/2021 •  SECTION     • COLONOSCOPY     • HYSTERECTOMY      age 56   • LYMPH NODE BIOPSY Left 2019    Procedure: SENTINEL NODE BIOPSY- NUC MED INJ at 1200;  Surgeon: Yessy Henson MD;  Location: [facility];  Service: Surgical Oncology   • LYMPHADENECTOMY     • MAMMO NEEDLE LOCALIZATION LEFT (ALL INC) Left 2019   • SKIN CANCER EXCISION     • US GUIDED BREAST BIOPSY LEFT COMPLETE Left 2019       Social History     Socioeconomic History   • Marital status: /Civil Union     Spouse name: Not on file   • Number of children: Not on file   • Years of education: Not on file   • Highest education level: Not on file   Occupational History   • Occupation:    Tobacco Use   • Smoking status: Former     Packs/day: 0 50     Years: 45 00     Pack years: 22 50     Types: Cigarettes     Quit date:      Years since quittin 1   • Smokeless tobacco: Never   • Tobacco comments:     Quit   Vaping Use   • Vaping Use: Not on file   Substance and Sexual Activity   • Alcohol use:  Yes     Alcohol/week: 1 0 standard drink     Types: 1 Glasses of wine per week     Comment: occasional   • Drug use: No   • Sexual activity: Not Currently     Partners: Male     Birth control/protection: Post-menopausal, Surgical     Comment:    Other Topics Concern   • Not on file   Social History Narrative   • Not on file     Social Determinants of Health     Financial Resource Strain: Not on file   Food Insecurity: Not on file   Transportation Needs: Not on file   Physical Activity: Not on file   Stress: Not on file   Social Connections: Not on file   Intimate Partner Violence: Not on file   Housing Stability: Not on file       Family History   Problem Relation Age of Onset   • Heart failure Mother    • Breast cancer Mother 78   • Hypertension Father    • Stroke Father    • No Known Problems Brother    • No Known Problems Brother    • No Known Problems Daughter    • No Known Problems Maternal Grandmother    • Cancer Maternal Grandfather 64        chest and neck   • No Known Problems Paternal Grandmother    • Cancer Paternal Grandfather         unknown type and age   • No Known Problems Daughter    • Stomach cancer Maternal Aunt 46   • Colon cancer Maternal Uncle 71   • Lung cancer Maternal Aunt 50   • No Known Problems Paternal Aunt    • No Known Problems Paternal Aunt    • No Known Problems Paternal Aunt        Allergies   Allergen Reactions   • Metoclopramide Myalgia     Reaction Date: 03Jun2011; Annotation - [id number]: hand cramps and biting of cheeks         Current Outpatient Medications:   •  anastrozole (ARIMIDEX) 1 mg tablet, Take 1 tablet (1 mg total) by mouth daily, Disp: 90 tablet, Rfl: 3  •  aspirin 81 MG tablet, Take by mouth, Disp: , Rfl:   •  Calcium Citrate-Vitamin D 250-200 MG-UNIT TABS, Take by mouth, Disp: , Rfl:   •  Denosumab (PROLIA SC), Inject under the skin every 6 (six) months, Disp: , Rfl:   •  HM VITAMIN D3 2000 units CAPS, Take by mouth in the morning, Disp: , Rfl:   •  multivitamin (THERAGRAN) TABS, Take 1 tablet by mouth daily, Disp: , Rfl:   •  pyridoxine (VITAMIN B6) 100 mg tablet, Take by mouth, Disp: , Rfl:   •  spironolactone (ALDACTONE) 50 mg tablet, Take 50 mg by mouth daily, Disp: , Rfl:       Physical Exam:  /78 (BP Location: Left arm)   Pulse 90   Temp (!) 96 9 °F (36 1 °C) (Tympanic)   Resp 18   Ht 5' 2" (1 575 m)   Wt 59 4 kg (131 lb)   SpO2 95%   BMI 23 96 kg/m²     Physical Exam  Vitals reviewed  Constitutional:       General: She is not in acute distress  Appearance: She is well-developed  She is not ill-appearing  HENT:      Head: Normocephalic and atraumatic  Eyes:      General: No scleral icterus  Conjunctiva/sclera: Conjunctivae normal    Cardiovascular:      Rate and Rhythm: Normal rate and regular rhythm  Heart sounds: Normal heart sounds  No murmur heard    Pulmonary:      Effort: Pulmonary effort is normal  No respiratory distress  Breath sounds: Normal breath sounds  Abdominal:      Palpations: Abdomen is soft  Tenderness: There is no abdominal tenderness  Musculoskeletal:         General: No tenderness  Normal range of motion  Cervical back: Normal range of motion and neck supple  Right lower leg: No edema  Left lower leg: No edema  Lymphadenopathy:      Cervical: No cervical adenopathy  Skin:     General: Skin is warm and dry  Neurological:      Mental Status: She is alert and oriented to person, place, and time  Cranial Nerves: No cranial nerve deficit  Psychiatric:         Mood and Affect: Mood normal          Behavior: Behavior normal        Labs:  Lab Results   Component Value Date    WBC 6 50 10/07/2019    HGB 13 4 10/07/2019    HCT 40 8 10/07/2019    MCV 97 10/07/2019     10/07/2019     I have spent 15 minutes with Patient  today in which greater than 50% of this time was spent in counseling/coordination of care regarding Diagnostic results, Instructions for management, Impressions, Documenting in the medical record and Reviewing / ordering tests, medicine, procedures    Patient voiced understanding and agreement in the above discussion  Aware to contact our office with questions/symptoms in the interim  This note has been generated by voice recognition software system  Therefore, there may be spelling, grammar, and or syntax errors  Please contact if questions arise

## 2023-03-13 DIAGNOSIS — M85.852 OSTEOPENIA OF LEFT HIP: Primary | ICD-10-CM

## 2023-03-13 DIAGNOSIS — Z17.0 MALIGNANT NEOPLASM OF UPPER-OUTER QUADRANT OF LEFT BREAST IN FEMALE, ESTROGEN RECEPTOR POSITIVE (HCC): ICD-10-CM

## 2023-03-13 DIAGNOSIS — C50.412 MALIGNANT NEOPLASM OF UPPER-OUTER QUADRANT OF LEFT BREAST IN FEMALE, ESTROGEN RECEPTOR POSITIVE (HCC): ICD-10-CM

## 2023-06-08 PROBLEM — Z01.419 ENCOUNTER FOR ANNUAL ROUTINE GYNECOLOGICAL EXAMINATION: Status: ACTIVE | Noted: 2023-06-08

## 2023-06-08 NOTE — PROGRESS NOTES
Assessment/Plan:  Ovarian Ca - MARCO/BSO with Chemo 62 '05                                                                                                                     Osteopenia - LFN BMD 11/21 T-1 8 [FRAX 3 6/14 %]; improved from - 2 1 '19  Prolia since '19                                       L Breast Ca - s/p Lumpectomy and Rads  Anastrozole  BRCA neg  Follows w Dr Josee Juárez 1 yr  SBA monthly  3 D Mammography   Colonoscopy  21  Exercise 3/wk                  Calcium 1,200 mg/d with Vit D     Depression Screen: Neg       Diagnoses and all orders for this visit:    Malignant neoplasm of upper-outer quadrant of left breast in female, estrogen receptor positive ([facility] [address] )    Use of anastrozole    BRCA negative    Malignant neoplasm of ovary, unspecified laterality ([facility] [address] )    Osteopenia of left hip              Subjective:        Patient ID: Reji Mora is a 76 y o  female  Mrs Shaw Goode presents today for a yearly evaluation  She is without any gynecological complaints  She was recently treated for 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 with an excisional biopsy  She just retired at the end of February  She has been on Arimidex since late 2019 with an expected use of 5 years  She recently had a stress test for chest pain  This was difficult to perform because of right-sided weakness due to Polio  When she becomes fatigued it is difficult to raise the right leg  She sees an oncologist and breast surgeon twice a year as follow-up for the breast cancer  A mammogram is scheduled for next month        The following portions of the patient's history were reviewed and updated as appropriate: She  has a past medical history of Basal cell carcinoma, Breast cancer ([facility] [address] ) (07/26/2019), Bulbar polio, Cataract, Colon polyp, Curvature of spine, Exercise involving walking, History of anemia as a child, History of radiation therapy (), Lymphedema, Macular degeneration, Muscle weakness, Osteopenia, Osteoporosis, Ovarian cancer ([place] [address] ) (), Scoliosis, Swallowing difficulty, Thyroid nodule, and Wears glasses  Patient Active Problem List    Diagnosis Date Noted   • Encounter for annual routine gynecological examination 2023   • Dense breast tissue 2020   • Osteopenia of left hip 2019   • Use of anastrozole 2019   • Ovarian cancer ([facility] [address] )    • BRCA negative 2019   • Malignant neoplasm of upper-outer quadrant of left breast in female, estrogen receptor positive ([facility] [address] ) 2019   PMH:  Polio 7 - R side weaker than L  Menarche 9  G3, ;  SAVD M , Inc Ab D&E , Twins/fraternal F's - SAVD, C/S for Prolapsed Cord/Breech due to an Arcuate Uterus [Dr Sandy Pichardo  Menopause  HRT  R Nose Basal Cell Ca   Ovarian Ca -radical hysterectomy with chemotherapy   R Cataract removed   Left breast cancer - lumpectomy with radiation   Followed by Dr Sheyla Aparicio  On Arimidex and Prolia  Macular degeneration with glaucoma  Cataracts - L removed   L Index finger - Squamous Cell Ca 3/23  Stress test for chest pain   She  has a past surgical history that includes Abdominal hysterectomy (N/A); Bowel resection; Colonoscopy; Lymphadenectomy; Skin cancer excision; Hysterectomy; US guided breast biopsy left complete (Left, 2019); Cataract extraction (Right, );  section; Mammo needle localization left (all inc) (Left, 2019); Lymph node biopsy (Left, 2019); Breast lumpectomy (Left, 2019); Breast biopsy (Left, 2015); Breast biopsy (Right, 2014); Breast biopsy (Left, ); Breast biopsy (Left, 2019); and Cataract extraction (Left, 2021)    Her family history includes Breast cancer (age of onset: 78) in her mother; Cancer in her paternal grandfather; Cancer (age of onset: 64) in her maternal grandfather; Colon cancer (age of onset: 71) in her maternal uncle; Heart failure in her mother; Hypertension in her father; Lung cancer (age of onset: 48) in her maternal aunt; No Known Problems in her brother, brother, daughter, daughter, maternal grandmother, paternal aunt, paternal aunt, paternal aunt, and paternal grandmother; Stomach cancer (age of onset: 46) in her maternal aunt; Stroke in her father  She  reports that she quit smoking about 18 years ago  Her smoking use included cigarettes  She has a 22 50 pack-year smoking history  She has never used smokeless tobacco  She reports current alcohol use of about 1 0 standard drink of alcohol per week  She reports that she does not use drugs  SH:   to [place]  Worked for the [email or web address], retired 2/23   has Bladder Ca and other health issues  Current Outpatient Medications   Medication Sig Dispense Refill   • anastrozole (ARIMIDEX) 1 mg tablet Take 1 tablet (1 mg total) by mouth daily 90 tablet 3   • aspirin 81 MG tablet Take by mouth     • Calcium Citrate-Vitamin D 250-200 MG-UNIT TABS Take by mouth     • Denosumab (PROLIA SC) Inject under the skin every 6 (six) months     • HM VITAMIN D3 2000 units CAPS Take by mouth in the morning     • multivitamin (THERAGRAN) TABS Take 1 tablet by mouth daily     • pyridoxine (VITAMIN B6) 100 mg tablet Take by mouth     • spironolactone (ALDACTONE) 50 mg tablet Take 50 mg by mouth daily       No current facility-administered medications for this visit       Current Outpatient Medications on File Prior to Visit   Medication Sig   • anastrozole (ARIMIDEX) 1 mg tablet Take 1 tablet (1 mg total) by mouth daily   • aspirin 81 MG tablet Take by mouth   • Calcium Citrate-Vitamin D 250-200 MG-UNIT TABS Take by mouth   • Denosumab (PROLIA SC) Inject under the skin every 6 (six) months   • HM VITAMIN D3 2000 units CAPS Take by mouth in the morning   • multivitamin (THERAGRAN) TABS Take 1 tablet by mouth daily   • pyridoxine "(VITAMIN B6) 100 mg tablet Take by mouth   • spironolactone (ALDACTONE) 50 mg tablet Take 50 mg by mouth daily     No current facility-administered medications on file prior to visit  She is allergic to metoclopramide       Review of Systems   Constitutional: Positive for fatigue  Negative for activity change, appetite change and unexpected weight change  Eyes: Negative for visual disturbance  Respiratory: Negative for cough, chest tightness, shortness of breath and wheezing  Cardiovascular: Positive for chest pain and leg swelling (Right-sided due to lymphadenectomy for ovarian cancer)  Negative for palpitations  Breast: Patient denies tenderness, nipple discharge, masses, or erythema  Gastrointestinal: Negative for abdominal distention, abdominal pain, blood in stool, constipation, diarrhea, nausea and vomiting  Endocrine: Negative for cold intolerance and heat intolerance  Genitourinary: Negative for decreased urine volume, difficulty urinating, dysuria, frequency, hematuria, menstrual problem, pelvic pain, urgency, vaginal bleeding, vaginal discharge and vaginal pain  Rare stress incontinence  Not sexually active  Musculoskeletal: Positive for arthralgias (Hands, knees, and ankles  )  Skin: Negative for rash  Neurological: Positive for weakness (Right-sided from polio as a child  )  Negative for light-headedness, numbness and headaches  Hematological: Does not bruise/bleed easily  Psychiatric/Behavioral: Negative for agitation, behavioral problems and sleep disturbance  The patient is not nervous/anxious  Objective:    Vitals:    06/12/23 0916   BP: 138/76   BP Location: Right arm   Patient Position: Sitting   Cuff Size: Standard   Height: 5' 4\" (1 626 m)            Physical Exam  Vitals and nursing note reviewed  Constitutional:       Appearance: Normal appearance  She is well-developed  HENT:      Head: Normocephalic and atraumatic     Eyes:      General: No " scleral icterus  Right eye: No discharge  Left eye: No discharge  Extraocular Movements: Extraocular movements intact  Conjunctiva/sclera: Conjunctivae normal    Neck:      Thyroid: No thyromegaly  Trachea: No tracheal deviation  Cardiovascular:      Rate and Rhythm: Normal rate and regular rhythm  Heart sounds: Normal heart sounds  No murmur heard  Pulmonary:      Effort: Pulmonary effort is normal  No respiratory distress  Breath sounds: Normal breath sounds  No wheezing  Chest:   Breasts:     Breasts are symmetrical       Right: No inverted nipple, mass, nipple discharge, skin change or tenderness  Left: No inverted nipple, mass, nipple discharge, skin change or tenderness  Comments: 2-1/2 cm sebaceous cyst over xiphoid  Dr Brenda Hilliard plans on removing when patient desires  Abdominal:      General: Abdomen is flat  Bowel sounds are normal  There is no distension  Palpations: Abdomen is soft  There is no mass  Tenderness: There is no abdominal tenderness  There is no guarding  Hernia: No hernia is present  Comments: Numerous scars from  section, laparotomy for ovarian cancer, port sites   Genitourinary:     General: Normal vulva  Labia:         Right: No rash, tenderness or lesion  Left: No rash, tenderness or lesion  Vagina: Normal       Adnexa:         Right: No mass, tenderness or fullness  Left: No mass, tenderness or fullness  Rectum: No external hemorrhoid  Comments: Urethral meatus within normal limits  Perineum within normal limits  Bladder well supported  Uterus and cervix surgically removed  Physiologic vaginal atrophy  Vaginal narrowing  Normal apex  Musculoskeletal:         General: Normal range of motion  Cervical back: Normal range of motion and neck supple     Lymphadenopathy:      Upper Body:      Right upper body: No supraclavicular, axillary or pectoral adenopathy  Left upper body: No supraclavicular, axillary or pectoral adenopathy  Skin:     General: Skin is warm and dry  Neurological:      Mental Status: She is alert and oriented to person, place, and time  Psychiatric:         Mood and Affect: Mood normal          Behavior: Behavior normal          Thought Content:  Thought content normal          Judgment: Judgment normal

## 2023-06-12 ENCOUNTER — ANNUAL EXAM (OUTPATIENT)
Dept: OBGYN CLINIC | Facility: CLINIC | Age: 78
End: 2023-06-12
Payer: MEDICARE

## 2023-06-12 VITALS — BODY MASS INDEX: 22.49 KG/M2 | HEIGHT: 64 IN | SYSTOLIC BLOOD PRESSURE: 138 MMHG | DIASTOLIC BLOOD PRESSURE: 76 MMHG

## 2023-06-12 DIAGNOSIS — Z79.811 USE OF ANASTROZOLE: ICD-10-CM

## 2023-06-12 DIAGNOSIS — C50.412 MALIGNANT NEOPLASM OF UPPER-OUTER QUADRANT OF LEFT BREAST IN FEMALE, ESTROGEN RECEPTOR POSITIVE (HCC): Primary | ICD-10-CM

## 2023-06-12 DIAGNOSIS — C56.9 MALIGNANT NEOPLASM OF OVARY, UNSPECIFIED LATERALITY (HCC): ICD-10-CM

## 2023-06-12 DIAGNOSIS — Z13.71 BRCA NEGATIVE: ICD-10-CM

## 2023-06-12 DIAGNOSIS — M85.852 OSTEOPENIA OF LEFT HIP: ICD-10-CM

## 2023-06-12 DIAGNOSIS — Z17.0 MALIGNANT NEOPLASM OF UPPER-OUTER QUADRANT OF LEFT BREAST IN FEMALE, ESTROGEN RECEPTOR POSITIVE (HCC): Primary | ICD-10-CM

## 2023-06-12 PROCEDURE — G0101 CA SCREEN;PELVIC/BREAST EXAM: HCPCS | Performed by: OBSTETRICS & GYNECOLOGY

## 2023-06-16 ENCOUNTER — HOSPITAL ENCOUNTER (OUTPATIENT)
Dept: MAMMOGRAPHY | Facility: CLINIC | Age: 78
Discharge: HOME | End: 2023-06-16
Payer: MEDICARE

## 2023-06-16 VITALS — BODY MASS INDEX: 22.36 KG/M2 | HEIGHT: 64 IN | WEIGHT: 131 LBS

## 2023-06-16 DIAGNOSIS — C50.412 MALIGNANT NEOPLASM OF UPPER-OUTER QUADRANT OF LEFT BREAST IN FEMALE, ESTROGEN RECEPTOR POSITIVE (HCC): ICD-10-CM

## 2023-06-16 DIAGNOSIS — Z17.0 MALIGNANT NEOPLASM OF UPPER-OUTER QUADRANT OF LEFT BREAST IN FEMALE, ESTROGEN RECEPTOR POSITIVE (HCC): ICD-10-CM

## 2023-06-16 PROCEDURE — G0279 TOMOSYNTHESIS, MAMMO: HCPCS

## 2023-06-16 PROCEDURE — 77066 DX MAMMO INCL CAD BI: CPT

## 2023-06-22 ENCOUNTER — APPOINTMENT (OUTPATIENT)
Dept: RADIATION ONCOLOGY | Facility: CLINIC | Age: 78
End: 2023-06-22
Attending: RADIOLOGY
Payer: MEDICARE

## 2023-06-23 ENCOUNTER — APPOINTMENT (OUTPATIENT)
Dept: RADIATION ONCOLOGY | Facility: CLINIC | Age: 78
End: 2023-06-23
Attending: RADIOLOGY
Payer: MEDICARE

## 2023-07-03 ENCOUNTER — CLINICAL SUPPORT (OUTPATIENT)
Dept: RADIATION ONCOLOGY | Facility: CLINIC | Age: 78
End: 2023-07-03
Attending: RADIOLOGY
Payer: MEDICARE

## 2023-07-03 VITALS
SYSTOLIC BLOOD PRESSURE: 135 MMHG | BODY MASS INDEX: 21 KG/M2 | WEIGHT: 123.02 LBS | OXYGEN SATURATION: 97 % | DIASTOLIC BLOOD PRESSURE: 66 MMHG | HEART RATE: 82 BPM | RESPIRATION RATE: 18 BRPM | HEIGHT: 64 IN | TEMPERATURE: 97.8 F

## 2023-07-03 DIAGNOSIS — Z17.0 MALIGNANT NEOPLASM OF UPPER-OUTER QUADRANT OF LEFT BREAST IN FEMALE, ESTROGEN RECEPTOR POSITIVE (HCC): Primary | ICD-10-CM

## 2023-07-03 DIAGNOSIS — C50.412 MALIGNANT NEOPLASM OF UPPER-OUTER QUADRANT OF LEFT BREAST IN FEMALE, ESTROGEN RECEPTOR POSITIVE (HCC): Primary | ICD-10-CM

## 2023-07-03 PROCEDURE — 99214 OFFICE O/P EST MOD 30 MIN: CPT | Performed by: RADIOLOGY

## 2023-07-03 PROCEDURE — 99211 OFF/OP EST MAY X REQ PHY/QHP: CPT | Performed by: RADIOLOGY

## 2023-07-03 NOTE — PROGRESS NOTES
Follow-up - Radiation Oncology   Margy Akins 1945 76 y.o. female [id number]      History of Present Illness   Cancer Staging   Malignant neoplasm of upper-outer quadrant of left breast in female, estrogen receptor positive ([address])  Staging form: Breast, AJCC 8th Edition  - Clinical: Stage IB (cT2, cN0, cM0, G2, ER+, MS+, HER2-) - Signed by Aisha Eduardo MD on 7/8/2019  Method of lymph node assessment: Clinical  Histologic grading system: 3 grade system  Laterality: Left  - Pathologic: Stage IB (pT2, pN1(sn), cM0, G2, ER+, MS+, HER2-) - Signed by Aisha Eduardo MD on 8/12/2019  Neoadjuvant therapy: No  Method of lymph node assessment: [place] lymph node biopsy  Histologic grading system: 3 grade system  Laterality: Left          Interval History:  Margy Lopez 1945 is a 76 y.o. female who completed a course of adjuvant radiation therapy for Stage IB (pT2, pN1(sn), cM0, G2, ER+, MS+, HER2-)  left breast carcinoma on 10/23/2019. She was started on adjuant hormonal therapy with anastrozole in September 2019. She was last seen by Radiation Oncology on 6/20/22.     She has history of ovarian cancer in 2005, genetic testing was negative.     7/1/22 Aleene Prior NP  No concerns on clinical breast exam. Has large cyst in the middle of her sternum which Dr. Montanez Parents will remove. F/u in 6 months.     8/16/22 Nela DINH  Continues on anastrozole (minimum of 5 year treatment). Is now receiving Prolia d/t osteopenia and received dose today. F/u in 6 months.      1/6/23 Aleene Prior NP  Mammogram due in June. DUSTIN on clinical breast exam. F/u in 6 months.     2/15/23 Nela [facility]IN[facility]  DEXA scan in November 2023. Continue on anastrozole and Prolia- tolerating well. F/u in 6 months.      6/12/23 Dr. Willis CINTRON  F/u for ovarian CA. Continues on anastrozole. Has sebaceous cyst over xiphoid.  To be removed by Dr. Montanez Parents when the pt desires.      6/16/23 Mammogram  IMPRESSION:   Stable exam.  Recommend bilateral diagnostic mammogram in 1 year   ASSESSMENT/BI-RADS CATEGORY:  Overall: 2 - Benign  RECOMMENDATION:       - Diagnostic mammogram in 1 year of the breast(s).       Upcomin23 Dr. Winton Dubin  8/15/23 Carmelita DINH  24 OBGYN        Historical Information   Oncology History   Malignant neoplasm of upper-outer quadrant of left breast in female, estrogen receptor positive ([address])   2019 Initial Diagnosis    Breast cancer ([address])     2019 Biopsy    Left breast, ultrasound-guided biopsy, 200 8cmfn 4 passes 12g Marquee:  - Invasive mammary carcinoma of no special type (ductal, not otherwise specified).   - grade 2  - %  - %  - HER2 by FISH negative     2019 Genetic Testing    BRCA negative     2019 -  Cancer Staged    Staging form: Breast, AJCC 8th Edition  - Clinical: Stage IB (cT2, cN0, cM0, G2, ER+, TN+, HER2-) - Signed by Saira Holder MD on 2019  Laterality: Left  Method of lymph node assessment: Clinical  Histologic grading system: 3 grade system       2019 Surgery    Left breast lumpectomy with SLN biopsy:  - Invasive mammary carcinoma, 2.7 cm, grade 2  - approximately 80% of tumor is DCIS  - LN's - one lymph node with metastatic carcinoma, 2.1mm deposit, no extranodal extention  - + LVI  - margins negative     2019 -  Cancer Staged    Staging form: Breast, AJCC 8th Edition  - Pathologic: Stage IB (pT2, pN1(sn), cM0, G2, ER+, TN+, HER2-) - Signed by Saira Holder MD on 2019  Neoadjuvant therapy: No  Laterality: Left  Method of lymph node assessment: [place] lymph node biopsy  Histologic grading system: 3 grade system       2019 - 10/23/2019 Radiation    Plan ID Energy Fractions Dose per Fraction (cGy) Dose Correction (cGy) Total Dose Delivered (cGy) Elapsed Days   BH L Breast 6X 16 / 16 266 0 4,256 21   L Brst Boost 12E 5 / 5 200 0 1,000 6     Dr Pj Matute     2019 -  Hormone Therapy Anastrozole 1 mg daily      Ovarian cancer ([address])    Initial Diagnosis    Ovarian cancer [facility])     2005 Surgery    MARCO/BSO    Dr Dipti Wlison      -  Chemotherapy    Dr Dipti Wilson         Past Medical History:   Diagnosis Date   • Basal cell carcinoma    • Breast cancer ([address]) 2019   • Bulbar polio    • Cataract    • Colon polyp    • Curvature of spine    • Exercise involving walking     "at work alot"   • History of anemia as a child     "after polio"   • History of radiation therapy 2019    left breast   • Lymphedema     left leg/"after abdominal lymph nodes removed"/wears thigh high compresion stocking"   • Macular degeneration     "and slight glaucoma per eye doctor both eyes"   • Muscle weakness     "from polio, predominantly right side"   • Osteopenia    • Osteoporosis    • Ovarian cancer ([address])     radical hysterectomy w/ chemo   • Scoliosis     with right hip pain occas   • Swallowing difficulty     "at times since polio"   • Thyroid nodule    • Wears glasses      Past Surgical History:   Procedure Laterality Date   • ABDOMINAL HYSTERECTOMY N/A     ovarian cancer   • BOWEL RESECTION     • BREAST BIOPSY Left 2015    benign   • BREAST BIOPSY Right 2014    benign   • BREAST BIOPSY Left     benign   • BREAST BIOPSY Left 2019    [facility]   • BREAST LUMPECTOMY Left 2019    Procedure: LUMPECTOMY BREAST NEEDLE LOC at 1100;  Surgeon: Sharyle Rosales, MD;  [facility];  Service: Surgical Oncology   • CATARACT EXTRACTION Right    • CATARACT EXTRACTION Left 2021   •  SECTION     • COLONOSCOPY     • HYSTERECTOMY      age 56   • LYMPH NODE BIOPSY Left 2019    Procedure: SENTINEL NODE BIOPSY- NUC MED INJ at 1200;  Surgeon: Sharyle Rosales, MD;  [facility];  Service: Surgical Oncology   • LYMPHADENECTOMY     • MAMMO NEEDLE LOCALIZATION LEFT (ALL INC) Left 2019   • SKIN CANCER EXCISION     • US GUIDED BREAST BIOPSY LEFT COMPLETE Left 2019 Social History   Social History     Substance and Sexual Activity   Alcohol Use Yes   • Alcohol/week: 1.0 standard drink of alcohol   • Types: 1 Glasses of wine per week    Comment: occasional     Social History     Substance and Sexual Activity   Drug Use No     Social History     Tobacco Use   Smoking Status Former   • Packs/day: 0.50   • Years: 45.00   • Total pack years: 22.50   • Types: Cigarettes   • Quit date:    • Years since quittin.5   Smokeless Tobacco Never   Tobacco Comments    Quit         Meds/Allergies     Current Outpatient Medications:   •  anastrozole (ARIMIDEX) 1 mg tablet, Take 1 tablet (1 mg total) by mouth daily, Disp: 90 tablet, Rfl: 3  •  aspirin 81 MG tablet, Take by mouth, Disp: , Rfl:   •  Calcium Citrate-Vitamin D 250-200 MG-UNIT TABS, Take by mouth, Disp: , Rfl:   •  Denosumab (PROLIA SC), Inject under the skin every 6 (six) months, Disp: , Rfl:   •  HM VITAMIN D3 2000 units CAPS, Take by mouth in the morning, Disp: , Rfl:   •  multivitamin (THERAGRAN) TABS, Take 1 tablet by mouth daily, Disp: , Rfl:   •  pyridoxine (VITAMIN B6) 100 mg tablet, Take by mouth, Disp: , Rfl:   •  spironolactone (ALDACTONE) 50 mg tablet, Take 50 mg by mouth daily, Disp: , Rfl:   Allergies   Allergen Reactions   • Metoclopramide Myalgia     Reaction Date: 2011; Annotation - [id number]: hand cramps and biting of cheeks         Review of Systems   Constitutional: Positive for fatigue and unexpected weight change (8 lbs since February. She states she is eating well but weight has been fluctuating). HENT: Negative. Eyes: Negative. Respiratory: Positive for shortness of breath (with exertion). Cardiovascular: Positive for leg swelling (left leg lymphedema). Gastrointestinal: Negative. Endocrine: Negative. Genitourinary: Negative. Musculoskeletal: Positive for arthralgias. Skin: Negative. SCC removed from left finger in March   Allergic/Immunologic: Negative. Neurological: Negative. Hematological: Negative. Psychiatric/Behavioral: Negative. Reports feeling stressed due to 's cancer diagnosis and treatments        OBJECTIVE:   /66   Pulse 82   Temp 97.8 °F (36.6 °C)   Resp 18   Ht 5' 4" (1.626 m)   Wt 55.8 kg (123 lb 0.3 oz)   SpO2 97%   BMI 21.12 kg/m²   Pain Assessment:  0  ECOG/Zubrod/WHO: 0 - Asymptomatic    Physical Exam     Supraclavicular axillary adenopathy palpable. No suspicious lesions palpable in either breast.  Skin in the radiated field is in good condition. No breast or arm edema. Breathing is unlabored. Ambulating independently        RESULTS    Lab Results: No results found for this or any previous visit (from the past 672 hour(s)). Imaging Studies:Mammo diagnostic bilateral w 3d & cad    Result Date: 6/16/2023  Narrative: DIAGNOSIS: Malignant neoplasm of upper-outer quadrant of left breast in female, estrogen receptor positive ([address]) TECHNIQUE: Digital diagnostic mammography was performed. Computer Aided Detection (CAD) analyzed all applicable images. COMPARISONS: Prior breast imaging dated: 06/15/2022, 06/14/2021, 06/11/2020, 07/26/2019, 07/26/2019, 07/01/2019, 05/30/2019, 05/30/2019, 05/21/2019, 05/21/2019, 05/10/2018, 05/10/2018, 05/04/2017, 05/04/2017, 05/10/2016, 05/10/2016, 05/14/2015, 11/24/2014, 11/24/2014, 06/11/2014, 06/11/2014, 04/23/2014, and 04/21/2014 RELEVANT HISTORY: Family Breast Cancer History: History of breast cancer in Mother. Family Medical History: Family medical history includes breast cancer in mother and colon cancer in maternal uncle. Personal History: Hormone history includes estrogen replacement therapy and other. Surgical history includes breast biopsy, lumpectomy, and hysterectomy. Medical history includes breast cancer and ovarian cancer. RISK ASSESSMENT: Tyrer-zick risk assessment reporting was suppressed due to the patient's history and/or demographic factors.  TISSUE DENSITY: The breasts are heterogeneously dense, which may obscure small masses. INDICATION: Mina Ventura is a 76 y.o. female presenting for annual. FINDINGS: Bilateral There are no suspicious masses, grouped microcalcifications or areas of unexplained architectural distortion. The skin and nipple areolar complex are unremarkable. Biopsy clips are present bilaterally. There are benign-appearing calcifications in both breasts which are unchanged. There are postop changes in the posterior outer left breast.     Impression:  Stable exam.  Recommend bilateral diagnostic mammogram in 1 year ASSESSMENT/BI-RADS CATEGORY:  Overall: 2 - Benign RECOMMENDATION:      - Diagnostic mammogram in 1 year of the breast(s). Workstation ID: [id number]          Assessment/Plan:  No orders of the defined types were placed in this encounter. Mina Ventura is a 76y.o. year old female who is 4 years post adjuvant radiation therapy for left breast carcinoma. She has no clinical evidence of recurrence. Mammogram 6/6. She remains on anastrozole with good tolerance. She does have some stress due to her  undergoing treatments for bladder cancer. Have not made any further follow-up appointments for her as she sees medical, surgical and GYN oncology on a regular basis she is 4 years posttreatment. I will be happy to see her in the future should the need arise      Adan Patel MD  7/3/2023,9:39 AM    Portions of the record may have been created with voice recognition software.  Occasional wrong word or "sound a like" substitutions may have occurred due to the inherent limitations of voice recognition software.  Read the chart carefully and recognize, using context, where substitutions have occurred.

## 2023-07-03 NOTE — PROGRESS NOTES
Bart Her 1945 is a 76 y.o. female who completed a course of adjuvant radiation therapy for Stage IB (pT2, pN1(sn), cM0, G2, ER+, OR+, HER2-)  left breast carcinoma on 10/23/2019. She was started on adjuant hormonal therapy with anastrozole in 2019. She was last seen by Radiation Oncology on 22. She has history of ovarian cancer in , genetic testing was negative. 22 Osmin Shukla NP  No concerns on clinical breast exam. Has large cyst in the middle of her sternum which Dr. Josesito Foley will remove. F/u in 6 months. 22 Rodri DINH  Continues on anastrozole (minimum of 5 year treatment). Is now receiving Prolia d/t osteopenia and received dose today. F/u in 6 months. 23 Osmin Shukla NP  Mammogram due in . DUSTIN on clinical breast exam. F/u in 6 months. 2/15/23 Rodri DINH  DEXA scan in 2023. Continue on anastrozole and Prolia- tolerating well. F/u in 6 months. 23 Dr. Beckie Rodrigues OBGYN  F/u for ovarian CA. Continues on anastrozole. Has sebaceous cyst over xiphoid. To be removed by Dr. Josesito Foley when the pt desires. 23 Mammogram  IMPRESSION:   Stable exam.  Recommend bilateral diagnostic mammogram in 1 year   ASSESSMENT/BI-RADS CATEGORY:  Overall: 2 - Benign  RECOMMENDATION:       - Diagnostic mammogram in 1 year of the breast(s). Upcomin23 Dr. Josesito Foley  8/15/23 Rodri DINH  24 OBGYN      Follow up visit     Oncology History   Malignant neoplasm of upper-outer quadrant of left breast in female, estrogen receptor positive ([address])   2019 Initial Diagnosis    Breast cancer ([address])     2019 Biopsy    Left breast, ultrasound-guided biopsy, 200 8cmfn 4 passes 12g Marquee:  - Invasive mammary carcinoma of no special type (ductal, not otherwise specified).   - grade 2  - %  - %  - HER2 by FISH negative     2019 Genetic Testing    BRCA negative 7/8/2019 -  Cancer Staged    Staging form: Breast, AJCC 8th Edition  - Clinical: Stage IB (cT2, cN0, cM0, G2, ER+, OK+, HER2-) - Signed by Ming King MD on 7/8/2019  Laterality: Left  Method of lymph node assessment: Clinical  Histologic grading system: 3 grade system       7/26/2019 Surgery    Left breast lumpectomy with SLN biopsy:  - Invasive mammary carcinoma, 2.7 cm, grade 2  - approximately 80% of tumor is DCIS  - LN's - one lymph node with metastatic carcinoma, 2.1mm deposit, no extranodal extention  - + LVI  - margins negative     8/12/2019 -  Cancer Staged    Staging form: Breast, AJCC 8th Edition  - Pathologic: Stage IB (pT2, pN1(sn), cM0, G2, ER+, OK+, HER2-) - Signed by Ming King MD on 8/12/2019  Neoadjuvant therapy: No  Laterality: Left  Method of lymph node assessment: [place] lymph node biopsy  Histologic grading system: 3 grade system       9/25/2019 - 10/23/2019 Radiation    Plan ID Energy Fractions Dose per Fraction (cGy) Dose Correction (cGy) Total Dose Delivered (cGy) Elapsed Days   BH L Breast 6X 16 / 16 266 0 4,256 21   L Brst Boost 12E 5 / 5 200 0 1,000 6     Dr Reginald Sanchez     9/2019 -  Hormone Therapy    Anastrozole 1 mg daily      Ovarian cancer ([address])   2005 Initial Diagnosis    Ovarian cancer [facility])     2005 Surgery    MARCO/BSO    Dr Nancy Allison     2005 -  Chemotherapy    Dr Nancy Allison         Review of Systems:  Review of Systems   Constitutional: Positive for fatigue and unexpected weight change (8 lbs since February. She states she is eating well but weight has been fluctuating). HENT: Negative. Eyes: Negative. Respiratory: Positive for shortness of breath (with exertion). Cardiovascular: Positive for leg swelling (left leg lymphedema). Gastrointestinal: Negative. Endocrine: Negative. Genitourinary: Negative. Musculoskeletal: Positive for arthralgias. Skin: Negative. SCC removed from left finger in March   Allergic/Immunologic: Negative.     Neurological: Negative. Hematological: Negative. Psychiatric/Behavioral: Negative.          Reports feeling stressed due to 's cancer diagnosis and treatments       Clinical Trial: no      Health Maintenance   Topic Date Due   • Hepatitis C Screening  Never done   • Medicare Annual Wellness Visit (AWV)  Never done   • Fall Risk  Never done   • Urinary Incontinence Screening  Never done   • COVID-19 Vaccine (3 - Pfizer series) 05/17/2021   • ONC Colorectal Surgery Screening  Never done   • Breast Cancer Survivorship Visit  Never done   • ONC Cervical Cancer Screening  12/30/2022   • ONC DXA Scan  12/30/2022   • ONC Physical Therapy Referral  Never done   • Influenza Vaccine (1) 09/01/2023   • BMI: Adult  06/16/2024   • Breast Cancer Screening: Mammogram  06/16/2024   • Depression Screening  07/03/2024   • Osteoporosis Screening  Completed   • Pneumococcal Vaccine: 65+ Years  Completed   • HIB Vaccine  Aged Out   • IPV Vaccine  Aged Out   • Hepatitis A Vaccine  Aged Out   • Meningococcal ACWY Vaccine  Aged Out   • HPV Vaccine  Aged Out     Patient Active Problem List   Diagnosis   • Malignant neoplasm of upper-outer quadrant of left breast in female, estrogen receptor positive ([address])   • BRCA negative   • Ovarian cancer ([address])   • Use of anastrozole   • Osteopenia of left hip   • Dense breast tissue   • Encounter for annual routine gynecological examination     Past Medical History:   Diagnosis Date   • Basal cell carcinoma    • Breast cancer ([address]) 07/26/2019   • Bulbar polio    • Cataract    • Colon polyp    • Curvature of spine    • Exercise involving walking     "at work alot"   • History of anemia as a child     "after polio"   • History of radiation therapy 2019    left breast   • Lymphedema     left leg/"after abdominal lymph nodes removed"/wears thigh high compresion stocking"   • Macular degeneration     "and slight glaucoma per eye doctor both eyes"   • Muscle weakness     "from polio, predominantly right side" • Osteopenia    • Osteoporosis    • Ovarian cancer ([address])     radical hysterectomy w/ chemo   • Scoliosis     with right hip pain occas   • Swallowing difficulty     "at times since polio"   • Thyroid nodule    • Wears glasses      Past Surgical History:   Procedure Laterality Date   • ABDOMINAL HYSTERECTOMY N/A     ovarian cancer   • BOWEL RESECTION     • BREAST BIOPSY Left 2015    benign   • BREAST BIOPSY Right 2014    benign   • BREAST BIOPSY Left     benign   • BREAST BIOPSY Left 2019    [facility]   • BREAST LUMPECTOMY Left 2019    Procedure: LUMPECTOMY BREAST NEEDLE LOC at 1100;  Surgeon: Nagi Weems MD;  [facility];  Service: Surgical Oncology   • CATARACT EXTRACTION Right    • CATARACT EXTRACTION Left 2021   •  SECTION     • COLONOSCOPY     • HYSTERECTOMY      age 56   • LYMPH NODE BIOPSY Left 2019    Procedure: SENTINEL NODE BIOPSY- NUC MED INJ at 1200;  Surgeon: Nagi Weems MD;  [facility];  Service: Surgical Oncology   • LYMPHADENECTOMY     • MAMMO NEEDLE LOCALIZATION LEFT (ALL INC) Left 2019   • SKIN CANCER EXCISION     • US GUIDED BREAST BIOPSY LEFT COMPLETE Left 2019     Family History   Problem Relation Age of Onset   • Heart failure Mother    • Breast cancer Mother 78   • Hypertension Father    • Stroke Father    • No Known Problems Brother    • No Known Problems Brother    • No Known Problems Daughter    • No Known Problems Maternal Grandmother    • Cancer Maternal Grandfather 64        chest and neck   • No Known Problems Paternal Grandmother    • Cancer Paternal Grandfather         unknown type and age   • No Known Problems Daughter    • Stomach cancer Maternal Aunt 52   • Colon cancer Maternal Uncle 69   • Lung cancer Maternal Aunt 50   • No Known Problems Paternal Aunt    • No Known Problems Paternal Aunt    • No Known Problems Paternal Aunt      Social History     Socioeconomic History   • Marital status: /Civil [employer]     Spouse name: Not on file   • Number of children: Not on file   • Years of education: Not on file   • Highest education level: Not on file   Occupational History   • Occupation:    Tobacco Use   • Smoking status: Former     Packs/day: 0.50     Years: 45.00     Total pack years: 22.50     Types: Cigarettes     Quit date:      Years since quittin.5   • Smokeless tobacco: Never   • Tobacco comments:     Quit   Vaping Use   • Vaping Use: Not on file   Substance and Sexual Activity   • Alcohol use:  Yes     Alcohol/week: 1.0 standard drink of alcohol     Types: 1 Glasses of wine per week     Comment: occasional   • Drug use: No   • Sexual activity: Not Currently     Partners: Male     Birth control/protection: Post-menopausal, Surgical     Comment:    Other Topics Concern   • Not on file   Social History Narrative   • Not on file     Social Determinants of Health     Financial Resource Strain: Not on file   Food Insecurity: Not on file   Transportation Needs: Not on file   Physical Activity: Not on file   Stress: Not on file   Social Connections: Not on file   Intimate Partner Violence: Not on file   Housing Stability: Not on file       Current Outpatient Medications:   •  anastrozole (ARIMIDEX) 1 mg tablet, Take 1 tablet (1 mg total) by mouth daily, Disp: 90 tablet, Rfl: 3  •  aspirin 81 MG tablet, Take by mouth, Disp: , Rfl:   •  Calcium Citrate-Vitamin D 250-200 MG-UNIT TABS, Take by mouth, Disp: , Rfl:   •  Denosumab (PROLIA SC), Inject under the skin every 6 (six) months, Disp: , Rfl:   •  HM VITAMIN D3 2000 units CAPS, Take by mouth in the morning, Disp: , Rfl:   •  multivitamin (THERAGRAN) TABS, Take 1 tablet by mouth daily, Disp: , Rfl:   •  pyridoxine (VITAMIN B6) 100 mg tablet, Take by mouth, Disp: , Rfl:   •  spironolactone (ALDACTONE) 50 mg tablet, Take 50 mg by mouth daily, Disp: , Rfl:   Allergies   Allergen Reactions   • Metoclopramide Myalgia Reaction Date: 03Jun2011;  Annotation - [id number]: hand cramps and biting of cheeks     Vitals:    07/03/23 0920   BP: 135/66   Pulse: 82   Resp: 18   Temp: 97.8 °F (36.6 °C)   SpO2: 97%   Weight: 55.8 kg (123 lb 0.3 oz)   Height: 5' 4" (1.626 m)      Pain Score: 0-No pain

## 2023-07-05 ENCOUNTER — OFFICE VISIT (OUTPATIENT)
Dept: SURGICAL ONCOLOGY | Facility: CLINIC | Age: 78
End: 2023-07-05
Payer: MEDICARE

## 2023-07-05 VITALS
WEIGHT: 125.8 LBS | OXYGEN SATURATION: 97 % | BODY MASS INDEX: 21.48 KG/M2 | DIASTOLIC BLOOD PRESSURE: 66 MMHG | HEART RATE: 86 BPM | SYSTOLIC BLOOD PRESSURE: 120 MMHG | RESPIRATION RATE: 18 BRPM | TEMPERATURE: 96.8 F | HEIGHT: 64 IN

## 2023-07-05 DIAGNOSIS — Z17.0 MALIGNANT NEOPLASM OF UPPER-OUTER QUADRANT OF LEFT BREAST IN FEMALE, ESTROGEN RECEPTOR POSITIVE (HCC): Primary | ICD-10-CM

## 2023-07-05 DIAGNOSIS — R92.2 DENSE BREAST TISSUE: ICD-10-CM

## 2023-07-05 DIAGNOSIS — Z13.71 BRCA NEGATIVE: ICD-10-CM

## 2023-07-05 DIAGNOSIS — L72.9 SKIN CYST: ICD-10-CM

## 2023-07-05 DIAGNOSIS — C50.412 MALIGNANT NEOPLASM OF UPPER-OUTER QUADRANT OF LEFT BREAST IN FEMALE, ESTROGEN RECEPTOR POSITIVE (HCC): Primary | ICD-10-CM

## 2023-07-05 DIAGNOSIS — Z79.811 USE OF ANASTROZOLE: ICD-10-CM

## 2023-07-05 PROCEDURE — 99214 OFFICE O/P EST MOD 30 MIN: CPT | Performed by: SURGERY

## 2023-07-05 NOTE — PROGRESS NOTES
Surgical Oncology Follow Up       [facility]Y [facility]  [facility] [id number]    Darline Rizzo  1945  [id number]  [address] [facility] SURGICAL ONCOLOGY [place] [id number]    Chief Complaint   Patient presents with   • Follow-up     Patient being seen for f/u. Last mammo 6/16/2023. Assessment/Plan   Diagnoses and all orders for this visit:    Malignant neoplasm of upper-outer quadrant of left breast in female, estrogen receptor positive ([address])    BRCA negative    Use of anastrozole    Dense breast tissue    Skin cyst        Advance Care Planning/Advance Directives:  Discussed disease status, cancer treatment plans and/or cancer treatment goals with the patient. Oncology History:    Oncology History   Malignant neoplasm of upper-outer quadrant of left breast in female, estrogen receptor positive ([address])   5/30/2019 Initial Diagnosis    Breast cancer ([address])     5/30/2019 Biopsy    Left breast, ultrasound-guided biopsy, 200 8cmfn 4 passes 12g Marquee:  - Invasive mammary carcinoma of no special type (ductal, not otherwise specified).   - grade 2  - %  - %  - HER2 by FISH negative     6/2019 Genetic Testing    BRCA negative     7/8/2019 -  Cancer Staged    Staging form: Breast, AJCC 8th Edition  - Clinical: Stage IB (cT2, cN0, cM0, G2, ER+, AR+, HER2-) - Signed by Oumar Chandler MD on 7/8/2019  Laterality: Left  Method of lymph node assessment: Clinical  Histologic grading system: 3 grade system       7/26/2019 Surgery    Left breast lumpectomy with SLN biopsy:  - Invasive mammary carcinoma, 2.7 cm, grade 2  - approximately 80% of tumor is DCIS  - LN's - one lymph node with metastatic carcinoma, 2.1mm deposit, no extranodal extention  - + LVI  - margins negative     8/12/2019 -  Cancer Staged    Staging form: Breast, AJCC 8th Edition  - Pathologic: Stage IB (pT2, pN1(sn), cM0, G2, ER+, NJ+, HER2-) - Signed by Nina Gross MD on 8/12/2019  Neoadjuvant therapy: No  Laterality: Left  Method of lymph node assessment: [place] lymph node biopsy  Histologic grading system: 3 grade system       9/25/2019 - 10/23/2019 Radiation    Plan ID Energy Fractions Dose per Fraction (cGy) Dose Correction (cGy) Total Dose Delivered (cGy) Elapsed Days   BH L Breast 6X 16 / 16 266 0 4,256 21   L Brst Boost 12E 5 / 5 200 0 1,000 6     Dr Celina Romero     9/2019 -  Hormone Therapy    Anastrozole 1 mg daily      Ovarian cancer ([address])   2005 Initial Diagnosis    Ovarian cancer [facility])     2005 Surgery    MARCO/BSO    Dr Sky Jenkins     2005 -  Chemotherapy    Dr Sky Jenkins         History of Present Illness: Breast cancer follow-up, reports an episode of the skin cyst along the sternum having become inflamed and then draining, reports decreased in size after this, no current issues, continues on anastrozole  -Interval History: Recent mammogram    Review of Systems:  Review of Systems   Constitutional: Positive for unexpected weight change ( attributes to 's health). Negative for appetite change and fever. Eyes: Negative. Respiratory: Negative for shortness of breath. Cardiovascular: Negative. Gastrointestinal: Negative. Endocrine: Negative. Genitourinary: Negative. Musculoskeletal: Negative. Negative for arthralgias and myalgias. Skin:        Recent squamous cell excision left finger   Allergic/Immunologic: Negative. Neurological: Negative. Hematological: Negative. Negative for adenopathy. Does not bruise/bleed easily. Psychiatric/Behavioral: Negative.         Patient Active Problem List   Diagnosis   • Malignant neoplasm of upper-outer quadrant of left breast in female, estrogen receptor positive ([address])   • BRCA negative   • Ovarian cancer ([address])   • Use of anastrozole   • Osteopenia of left hip   • Dense breast tissue   • Encounter for annual routine gynecological examination   • Skin cyst     Past Medical History:   Diagnosis Date   • Basal cell carcinoma    • Bulbar polio    • Cataract    • Colon polyp    • Curvature of spine    • Exercise involving walking     "at work alot"   • History of anemia as a child     "after polio"   • History of radiation therapy 2019    left breast   • Lymphedema     left leg/"after abdominal lymph nodes removed"/wears thigh high compresion stocking"   • Macular degeneration     "and slight glaucoma per eye doctor both eyes"   • Muscle weakness     "from polio, predominantly right side"   • Osteopenia    • Osteoporosis    • Ovarian cancer ([address])     radical hysterectomy w/ chemo   • Scoliosis     with right hip pain occas   • Swallowing difficulty     "at times since polio"   • Thyroid nodule    • Wears glasses      Past Surgical History:   Procedure Laterality Date   • ABDOMINAL HYSTERECTOMY N/A     ovarian cancer   • BOWEL RESECTION     • BREAST BIOPSY Left 2015    benign   • BREAST BIOPSY Right 2014    benign   • BREAST BIOPSY Left     benign   • BREAST BIOPSY Left 2019    [facility]   • BREAST LUMPECTOMY Left 2019    Procedure: LUMPECTOMY BREAST NEEDLE LOC at 1100;  Surgeon: April Davenport MD;  [facility];  Service: Surgical Oncology   • CATARACT EXTRACTION Right    • CATARACT EXTRACTION Left 2021   •  SECTION     • COLONOSCOPY     • HYSTERECTOMY      age 56   • LYMPH NODE BIOPSY Left 2019    Procedure: SENTINEL NODE BIOPSY- NUC MED INJ at 1200;  Surgeon: April Davenport MD;  [facility];  Service: Surgical Oncology   • LYMPHADENECTOMY     • MAMMO NEEDLE LOCALIZATION LEFT (ALL INC) Left 2019   • SKIN CANCER EXCISION     • US GUIDED BREAST BIOPSY LEFT COMPLETE Left 2019     Family History   Problem Relation Age of Onset   • Heart failure Mother    • Breast cancer Mother 78   • Hypertension Father    • Stroke Father    • No Known Problems Brother    • No Known Problems Brother    • No Known Problems Daughter    • No Known Problems Maternal Grandmother    • Cancer Maternal Grandfather 64        chest and neck   • No Known Problems Paternal Grandmother    • Cancer Paternal Grandfather         unknown type and age   • No Known Problems Daughter    • Stomach cancer Maternal Aunt 46   • Colon cancer Maternal Uncle 71   • Lung cancer Maternal Aunt 50   • No Known Problems Paternal Aunt    • No Known Problems Paternal Aunt    • No Known Problems Paternal Aunt      Social History     Socioeconomic History   • Marital status: /Civil Union     Spouse name: Not on file   • Number of children: Not on file   • Years of education: Not on file   • Highest education level: Not on file   Occupational History   • Occupation:    Tobacco Use   • Smoking status: Former     Packs/day: 0.50     Years: 45.00     Total pack years: 22.50     Types: Cigarettes     Quit date:      Years since quittin.5   • Smokeless tobacco: Never   • Tobacco comments:     Quit   Vaping Use   • Vaping Use: Not on file   Substance and Sexual Activity   • Alcohol use:  Yes     Alcohol/week: 1.0 standard drink of alcohol     Types: 1 Glasses of wine per week     Comment: occasional   • Drug use: No   • Sexual activity: Not Currently     Partners: Male     Birth control/protection: Post-menopausal, Surgical     Comment:    Other Topics Concern   • Not on file   Social History Narrative   • Not on file     Social Determinants of Health     Financial Resource Strain: Not on file   Food Insecurity: Not on file   Transportation Needs: Not on file   Physical Activity: Not on file   Stress: Not on file   Social Connections: Not on file   Intimate Partner Violence: Not on file   Housing Stability: Not on file       Current Outpatient Medications:   •  anastrozole (ARIMIDEX) 1 mg tablet, Take 1 tablet (1 mg total) by mouth daily, Disp: 90 tablet, Rfl: 3  •  aspirin 81 MG tablet, Take by mouth, Disp: , Rfl: •  Calcium Citrate-Vitamin D 250-200 MG-UNIT TABS, Take by mouth, Disp: , Rfl:   •  Denosumab (PROLIA SC), Inject under the skin every 6 (six) months, Disp: , Rfl:   •  HM VITAMIN D3 2000 units CAPS, Take by mouth in the morning, Disp: , Rfl:   •  multivitamin (THERAGRAN) TABS, Take 1 tablet by mouth daily, Disp: , Rfl:   •  pyridoxine (VITAMIN B6) 100 mg tablet, Take by mouth, Disp: , Rfl:   •  spironolactone (ALDACTONE) 50 mg tablet, Take 50 mg by mouth daily, Disp: , Rfl:   Allergies   Allergen Reactions   • Metoclopramide Myalgia     Reaction Date: 03Jun2011; Annotation - [id number]: hand cramps and biting of cheeks       The following portions of the patient's history were reviewed and updated as appropriate: allergies, current medications, past family history, past medical history, past social history, past surgical history and problem list.        Vitals:    07/05/23 0917   BP: 120/66   Pulse: 86   Resp: 18   Temp: (!) 96.8 °F (36 °C)   SpO2: 97%       Physical Exam  Constitutional:       General: She is not in acute distress. Appearance: Normal appearance. She is well-developed. HENT:      Head: Normocephalic and atraumatic. Cardiovascular:      Heart sounds: Normal heart sounds. Pulmonary:      Breath sounds: Normal breath sounds. Chest:   Breasts:     Right: No inverted nipple, mass, nipple discharge, skin change or tenderness. Left: Skin change ( lumpectomy scar) present. No inverted nipple, mass, nipple discharge or tenderness. Comments: Skin cyst lower sternal border  Abdominal:      Palpations: Abdomen is soft. Lymphadenopathy:      Upper Body:      Right upper body: No supraclavicular, axillary or pectoral adenopathy. Left upper body: No supraclavicular, axillary or pectoral adenopathy. Neurological:      Mental Status: She is alert and oriented to person, place, and time.    Psychiatric:         Mood and Affect: Mood normal. Results:  Labs:      Imaging  6/16/2023 bilateral 3D diagnostic mammogram was benign BI-RADS 2 with a density of 3    I reviewed the above imaging data. Discussion/Summary: 76 female status post left breast conservation. She continues on anastrozole. She has a personal history of ovarian cancer as well. Her genetic testing was negative. Her recent mammogram was benign. There is no evidence of disease based on exam today. I made supportive recommendations regarding the skin cyst.  We will see her again in 6 months or sooner should the need arise.

## 2023-07-10 ENCOUNTER — TELEPHONE (OUTPATIENT)
Dept: HEMATOLOGY ONCOLOGY | Facility: CLINIC | Age: 78
End: 2023-07-10

## 2023-07-10 NOTE — TELEPHONE ENCOUNTER
Lab Inquiry   Who are you speaking with? Patient     If it is not the patient, are they listed on an active communication consent form? N/A   Name of ordering provider Mary Jacob PA-C   What is being requested? Patient states she never received the order for labs and would like them mailed   Lab draw location [facility] ([facility])   What is the best call back number?  [phone]

## 2023-07-14 ENCOUNTER — TELEPHONE (OUTPATIENT)
Dept: HEMATOLOGY ONCOLOGY | Facility: CLINIC | Age: 78
End: 2023-07-14

## 2023-07-14 DIAGNOSIS — C50.412 MALIGNANT NEOPLASM OF UPPER-OUTER QUADRANT OF LEFT BREAST IN FEMALE, ESTROGEN RECEPTOR POSITIVE (HCC): Primary | ICD-10-CM

## 2023-07-14 DIAGNOSIS — Z17.0 MALIGNANT NEOPLASM OF UPPER-OUTER QUADRANT OF LEFT BREAST IN FEMALE, ESTROGEN RECEPTOR POSITIVE (HCC): Primary | ICD-10-CM

## 2023-07-14 NOTE — TELEPHONE ENCOUNTER
Patient Call    Who are you speaking with? Patient    If it is not the patient, are they listed on an active communication consent form? N/A   What is the reason for this call? Patient received the script for her blood work but she states the insurance information is incorrect. Primary is medicare, secondary is blue cross and it states the other way around on the script. Does this require a call back? Yes   If a call back is required, please list best call back number [phone]   If a call back is required, advise that a message will be forwarded to their care team and someone will return their call as soon as possible. Did you relay this information to the patient?  Yes

## 2023-07-14 NOTE — TELEPHONE ENCOUNTER
Returned telephone call spoke with Pt. I reordered her labs. She retired in May and the labs were originally ordered prior to her retiring and that is why [facility] was secondary and work insurance was primary. Now the [facility] is primary. I mailed the new lab orders to her home as she goes to [facility].

## 2023-08-15 ENCOUNTER — OFFICE VISIT (OUTPATIENT)
Dept: HEMATOLOGY ONCOLOGY | Facility: CLINIC | Age: 78
End: 2023-08-15
Payer: MEDICARE

## 2023-08-15 ENCOUNTER — HOSPITAL ENCOUNTER (OUTPATIENT)
Dept: INFUSION CENTER | Facility: CLINIC | Age: 78
Discharge: HOME | End: 2023-08-15
Payer: MEDICARE

## 2023-08-15 VITALS
HEIGHT: 62 IN | WEIGHT: 131 LBS | OXYGEN SATURATION: 95 % | DIASTOLIC BLOOD PRESSURE: 82 MMHG | TEMPERATURE: 96.9 F | RESPIRATION RATE: 18 BRPM | SYSTOLIC BLOOD PRESSURE: 134 MMHG | HEART RATE: 84 BPM | BODY MASS INDEX: 24.11 KG/M2

## 2023-08-15 VITALS
RESPIRATION RATE: 18 BRPM | HEART RATE: 80 BPM | SYSTOLIC BLOOD PRESSURE: 138 MMHG | TEMPERATURE: 97.2 F | DIASTOLIC BLOOD PRESSURE: 58 MMHG

## 2023-08-15 DIAGNOSIS — Z17.0 MALIGNANT NEOPLASM OF UPPER-OUTER QUADRANT OF LEFT BREAST IN FEMALE, ESTROGEN RECEPTOR POSITIVE (HCC): ICD-10-CM

## 2023-08-15 DIAGNOSIS — C50.412 MALIGNANT NEOPLASM OF UPPER-OUTER QUADRANT OF LEFT BREAST IN FEMALE, ESTROGEN RECEPTOR POSITIVE (HCC): ICD-10-CM

## 2023-08-15 DIAGNOSIS — Z79.811 LONG TERM (CURRENT) USE OF AROMATASE INHIBITORS: ICD-10-CM

## 2023-08-15 DIAGNOSIS — M85.852 OSTEOPENIA OF LEFT HIP: Primary | ICD-10-CM

## 2023-08-15 PROCEDURE — 99214 OFFICE O/P EST MOD 30 MIN: CPT | Performed by: PHYSICIAN ASSISTANT

## 2023-08-15 RX ORDER — ANASTROZOLE 1 MG/1
1 TABLET ORAL DAILY
Qty: 90 TABLET | Refills: 3 | Status: SHIPPED | OUTPATIENT
Start: 2023-08-15

## 2023-08-15 RX ADMIN — DENOSUMAB 60 MG: 60 INJECTION SUBCUTANEOUS at 10:04

## 2023-08-15 NOTE — PROGRESS NOTES
Pt arrived to unit without complaint. CrCl=48.5. Pt denies any recent or upcoming dental procedures. Pt tolerated Prolia injection in left arm without incident. AVS declined, but aware of future appts. Pt left unit in stable condition.

## 2023-08-15 NOTE — PROGRESS NOTES
Hematology/Oncology Outpatient Follow-up  Luiz Gilbert 76 y.o. female 1945 [id number]    Date:  8/15/2023  Assessment and Plan:  1. Osteopenia of left hip 2. Long term (current) use of aromatase inhibito  She is on Prolia secondary to osteopenia in the setting of anastrozole. She will be due for repeat DEXA scan in November 2023rs    - DXA bone density spine hip and pelvis; Future    3. Malignant neoplasm of upper-outer quadrant of left breast in female, estrogen receptor positive [facility])  49-year-old female presents for follow-up regarding history of breast cancer diagnosed in 2019.  She remains on adjuvant anastrozole.  She tolerates this well. Reviewed she will remain for minimum of 5 years. She continues on calcium and vitamin D. She remains active. We reviewed her labs which were normal.  Follow up in 6 months.     - CBC and differential; Future  - Comprehensive metabolic panel; Future  - Cancer antigen 27.29; Future  - anastrozole (ARIMIDEX) 1 mg tablet; Take 1 tablet (1 mg total) by mouth daily  Dispense: 90 tablet; Refill: 3      HPI:  Oncology History   Malignant neoplasm of upper-outer quadrant of left breast in female, estrogen receptor positive ([address])   5/30/2019 Initial Diagnosis    Breast cancer ([address])     5/30/2019 Biopsy    Left breast, ultrasound-guided biopsy, 200 8cmfn 4 passes 12g Marquee:  - Invasive mammary carcinoma of no special type (ductal, not otherwise specified).   - grade 2  - %  - %  - HER2 by FISH negative     6/2019 Genetic Testing    BRCA negative     7/8/2019 -  Cancer Staged    Staging form: Breast, AJCC 8th Edition  - Clinical: Stage IB (cT2, cN0, cM0, G2, ER+, MN+, HER2-) - Signed by Shon Orr MD on 7/8/2019  Laterality: Left  Method of lymph node assessment: Clinical  Histologic grading system: 3 grade system       7/26/2019 Surgery    Left breast lumpectomy with SLN biopsy:  - Invasive mammary carcinoma, 2.7 cm, grade 2  - approximately 80% of tumor is DCIS  - LN's - one lymph node with metastatic carcinoma, 2.1mm deposit, no extranodal extention  - + LVI  - margins negative     8/12/2019 -  Cancer Staged    Staging form: Breast, AJCC 8th Edition  - Pathologic: Stage IB (pT2, pN1(sn), cM0, G2, ER+, LA+, HER2-) - Signed by Vel Cummins MD on 8/12/2019  Neoadjuvant therapy: No  Laterality: Left  Method of lymph node assessment: [place] lymph node biopsy  Histologic grading system: 3 grade system       9/25/2019 - 10/23/2019 Radiation    Plan ID Energy Fractions Dose per Fraction (cGy) Dose Correction (cGy) Total Dose Delivered (cGy) Elapsed Days   BH L Breast 6X 16 / 16 266 0 4,256 21   L Brst Boost 12E 5 / 5 200 0 1,000 6     Dr Evelio Allen     9/2019 -  Hormone Therapy    Anastrozole 1 mg daily      Ovarian cancer ([address])   2005 Initial Diagnosis    Ovarian cancer [facility])     2005 Surgery    MARCO/BSO    Dr Shabana Logan     2005 -  Chemotherapy    Dr Shabana Logan       76-year-old female (ECOG 0) with history of invasive mammary carcinoma who is status post lumpectomy and sentinel lymph node sampling. Patient's tumor measured 2.7 cm, grade 2 of 3.  Approximately 80% of tumor is DCIS.      3 sentinel lymph nodes were removed.  1 had focus of metastatic carcinoma measuring 2.1 mm; however negative for extranodal extension.     Patient's anatomic stage is at least stage IIB, T2, N1 a, C M0, G2.  %, %, her 2-by FISH     She had postoperative complications with hematoma formation for which she has been following Surgical Oncology. Casandra Villa states that she continues to apply warm compresses to the area and it is improving.  She has follow-up with Surgical Oncology for routine visit in November 2019.      Following surgery patient was noted to have very slight invasion into 1 lymph node, stage IIB.  She was offered chemotherapy.  He was to proceed with aromatase inhibitor alone.     She is on anastrozole 1 mg.      Patient has had genetic testing in the past due to history of ovarian cancer and is negative.      She has been on Prolia 60 mg every 6 months with first dose on 1/13/20.      Interval history:    ROS: Review of Systems   Constitutional: Negative for appetite change, chills, fatigue, fever and unexpected weight change. Respiratory: Negative for cough and shortness of breath. Cardiovascular: Negative for chest pain, palpitations and leg swelling. Gastrointestinal: Negative for abdominal pain, constipation, diarrhea, nausea and vomiting. Genitourinary: Negative for difficulty urinating, dysuria and hematuria. Musculoskeletal: Positive for arthralgias (age related arthritis ). Skin: Negative. Neurological: Negative for dizziness, weakness, light-headedness, numbness and headaches. Hematological: Negative. Psychiatric/Behavioral: Negative.         Past Medical History:   Diagnosis Date   • Basal cell carcinoma    • Bulbar polio    • Cataract    • Colon polyp    • Curvature of spine    • Exercise involving walking     "at work alot"   • History of anemia as a child     "after polio"   • History of radiation therapy 2019    left breast   • Lymphedema     left leg/"after abdominal lymph nodes removed"/wears thigh high compresion stocking"   • Macular degeneration     "and slight glaucoma per eye doctor both eyes"   • Muscle weakness     "from polio, predominantly right side"   • Osteopenia    • Osteoporosis    • Ovarian cancer ([address]) 2005    radical hysterectomy w/ chemo   • Scoliosis     with right hip pain occas   • Swallowing difficulty     "at times since polio"   • Thyroid nodule    • Wears glasses        Past Surgical History:   Procedure Laterality Date   • ABDOMINAL HYSTERECTOMY N/A     ovarian cancer   • BOWEL RESECTION     • BREAST BIOPSY Left 01/12/2015    benign   • BREAST BIOPSY Right 06/11/2014    benign   • BREAST BIOPSY Left 2007    benign   • BREAST BIOPSY Left 05/30/2019    [facility]   • BREAST LUMPECTOMY Left 07/26/2019    Procedure: LUMPECTOMY BREAST NEEDLE LOC at 1100;  Surgeon: Wiley Colvin MD;  [facility];  Service: Surgical Oncology   • CATARACT EXTRACTION Right    • CATARACT EXTRACTION Left 2021   •  SECTION     • COLONOSCOPY     • HYSTERECTOMY      age 56   • LYMPH NODE BIOPSY Left 2019    Procedure: SENTINEL NODE BIOPSY- NUC MED INJ at 1200;  Surgeon: Wiley Colvin MD;  [facility];  Service: Surgical Oncology   • LYMPHADENECTOMY     • MAMMO NEEDLE LOCALIZATION LEFT (ALL INC) Left 2019   • SKIN CANCER EXCISION     • US GUIDED BREAST BIOPSY LEFT COMPLETE Left 2019       Social History     Socioeconomic History   • Marital status: /Civil Union     Spouse name: Not on file   • Number of children: Not on file   • Years of education: Not on file   • Highest education level: Not on file   Occupational History   • Occupation:    Tobacco Use   • Smoking status: Former     Packs/day: 0.50     Years: 45.00     Total pack years: 22.50     Types: Cigarettes     Quit date:      Years since quittin.6   • Smokeless tobacco: Never   • Tobacco comments:     Quit   Vaping Use   • Vaping Use: Not on file   Substance and Sexual Activity   • Alcohol use:  Yes     Alcohol/week: 1.0 standard drink of alcohol     Types: 1 Glasses of wine per week     Comment: occasional   • Drug use: No   • Sexual activity: Not Currently     Partners: Male     Birth control/protection: Post-menopausal, Surgical     Comment:    Other Topics Concern   • Not on file   Social History Narrative   • Not on file     Social Determinants of Health     Financial Resource Strain: Not on file   Food Insecurity: Not on file   Transportation Needs: Not on file   Physical Activity: Not on file   Stress: Not on file   Social Connections: Not on file   Intimate Partner Violence: Not on file   Housing Stability: Not on file       Family History   Problem Relation Age of Onset   • Heart failure Mother    • Breast cancer Mother 78   • Hypertension Father    • Stroke Father    • No Known Problems Brother    • No Known Problems Brother    • No Known Problems Daughter    • No Known Problems Maternal Grandmother    • Cancer Maternal Grandfather 64        chest and neck   • No Known Problems Paternal Grandmother    • Cancer Paternal Grandfather         unknown type and age   • No Known Problems Daughter    • Stomach cancer Maternal Aunt 46   • Colon cancer Maternal Uncle 71   • Lung cancer Maternal Aunt 50   • No Known Problems Paternal Aunt    • No Known Problems Paternal Aunt    • No Known Problems Paternal Aunt        Allergies   Allergen Reactions   • Metoclopramide Myalgia     Reaction Date: 03Jun2011; Annotation - [id number]: hand cramps and biting of cheeks         Current Outpatient Medications:   •  anastrozole (ARIMIDEX) 1 mg tablet, Take 1 tablet (1 mg total) by mouth daily, Disp: 90 tablet, Rfl: 3  •  aspirin 81 MG tablet, Take by mouth, Disp: , Rfl:   •  Calcium Citrate-Vitamin D 250-200 MG-UNIT TABS, Take by mouth, Disp: , Rfl:   •  Denosumab (PROLIA SC), Inject under the skin every 6 (six) months, Disp: , Rfl:   •  HM VITAMIN D3 2000 units CAPS, Take by mouth in the morning, Disp: , Rfl:   •  multivitamin (THERAGRAN) TABS, Take 1 tablet by mouth daily, Disp: , Rfl:   •  pyridoxine (VITAMIN B6) 100 mg tablet, Take by mouth, Disp: , Rfl:   •  spironolactone (ALDACTONE) 50 mg tablet, Take 50 mg by mouth daily, Disp: , Rfl:     Physical Exam:  Pulse 84   Temp (!) 96.9 °F (36.1 °C) (Tympanic)   Resp 18   Ht 5' 2" (1.575 m)   Wt 59.4 kg (131 lb)   SpO2 95%   BMI 23.96 kg/m²     Physical Exam  Vitals reviewed. Constitutional:       General: She is not in acute distress. Appearance: She is well-developed. She is not ill-appearing. HENT:      Head: Normocephalic and atraumatic. Eyes:      General: No scleral icterus.      Conjunctiva/sclera: Conjunctivae normal.   Cardiovascular:      Rate and Rhythm: Normal rate and regular rhythm. Heart sounds: Normal heart sounds. No murmur heard. Pulmonary:      Effort: Pulmonary effort is normal. No respiratory distress. Breath sounds: Normal breath sounds. Abdominal:      Palpations: Abdomen is soft. Tenderness: There is no abdominal tenderness. Musculoskeletal:         General: No tenderness. Normal range of motion. Cervical back: Normal range of motion and neck supple. Right lower leg: No edema. Left lower leg: No edema. Lymphadenopathy:      Head:      Right side of head: No submandibular adenopathy. Left side of head: No submandibular adenopathy. Cervical: No cervical adenopathy. Upper Body:      Right upper body: No supraclavicular or axillary adenopathy. Left upper body: No supraclavicular or axillary adenopathy. Skin:     General: Skin is warm and dry. Neurological:      Mental Status: She is alert and oriented to person, place, and time. Cranial Nerves: No cranial nerve deficit. Psychiatric:         Mood and Affect: Mood normal.         Behavior: Behavior normal.         Labs:  Lab Results   Component Value Date    WBC 6.50 10/07/2019    HGB 13.4 10/07/2019    HCT 40.8 10/07/2019    MCV 97 10/07/2019     10/07/2019     I have spent 20 minutes with Patient  today in which greater than 50% of this time was spent in counseling/coordination of care regarding Diagnostic results, Instructions for management, Importance of tx compliance, Impressions, Documenting in the medical record, Reviewing / ordering tests, medicine, procedures   and Obtaining or reviewing history  . Patient voiced understanding and agreement in the above discussion. Aware to contact our office with questions/symptoms in the interim. This note has been generated by voice recognition software system. Therefore, there may be spelling, grammar, and or syntax errors. Please contact if questions arise.

## 2023-08-28 DIAGNOSIS — C50.412 MALIGNANT NEOPLASM OF UPPER-OUTER QUADRANT OF LEFT BREAST IN FEMALE, ESTROGEN RECEPTOR POSITIVE (HCC): ICD-10-CM

## 2023-08-28 DIAGNOSIS — M85.852 OSTEOPENIA OF LEFT HIP: Primary | ICD-10-CM

## 2023-08-28 DIAGNOSIS — Z17.0 MALIGNANT NEOPLASM OF UPPER-OUTER QUADRANT OF LEFT BREAST IN FEMALE, ESTROGEN RECEPTOR POSITIVE (HCC): ICD-10-CM

## 2023-10-18 ENCOUNTER — TELEPHONE (OUTPATIENT)
Dept: HEMATOLOGY ONCOLOGY | Facility: CLINIC | Age: 78
End: 2023-10-18

## 2023-10-18 NOTE — TELEPHONE ENCOUNTER
Patient needs later appt same day as she already has an appt in the am.  1/5/24 9:30am     1/5/24  2:30pm

## 2023-11-30 ENCOUNTER — HOSPITAL ENCOUNTER (OUTPATIENT)
Dept: BONE DENSITY | Facility: MEDICAL CENTER | Age: 78
Discharge: HOME | End: 2023-11-30
Payer: MEDICARE

## 2023-11-30 DIAGNOSIS — M85.852 OSTEOPENIA OF LEFT HIP: ICD-10-CM

## 2023-11-30 DIAGNOSIS — Z79.811 LONG TERM (CURRENT) USE OF AROMATASE INHIBITORS: ICD-10-CM

## 2023-11-30 PROCEDURE — 77080 DXA BONE DENSITY AXIAL: CPT

## 2024-01-05 ENCOUNTER — OFFICE VISIT (OUTPATIENT)
Dept: SURGICAL ONCOLOGY | Facility: CLINIC | Age: 79
End: 2024-01-05
Payer: MEDICARE

## 2024-01-05 VITALS
DIASTOLIC BLOOD PRESSURE: 66 MMHG | WEIGHT: 124.6 LBS | SYSTOLIC BLOOD PRESSURE: 140 MMHG | BODY MASS INDEX: 22.93 KG/M2 | HEIGHT: 62 IN | HEART RATE: 87 BPM | OXYGEN SATURATION: 95 % | TEMPERATURE: 97.7 F

## 2024-01-05 DIAGNOSIS — C56.9 MALIGNANT NEOPLASM OF OVARY, UNSPECIFIED LATERALITY (HCC): ICD-10-CM

## 2024-01-05 DIAGNOSIS — R92.30 DENSE BREAST TISSUE: ICD-10-CM

## 2024-01-05 DIAGNOSIS — C50.412 MALIGNANT NEOPLASM OF UPPER-OUTER QUADRANT OF LEFT BREAST IN FEMALE, ESTROGEN RECEPTOR POSITIVE: Primary | ICD-10-CM

## 2024-01-05 DIAGNOSIS — Z17.0 MALIGNANT NEOPLASM OF UPPER-OUTER QUADRANT OF LEFT BREAST IN FEMALE, ESTROGEN RECEPTOR POSITIVE: Primary | ICD-10-CM

## 2024-01-05 DIAGNOSIS — Z79.811 USE OF ANASTROZOLE: ICD-10-CM

## 2024-01-05 PROCEDURE — 99214 OFFICE O/P EST MOD 30 MIN: CPT

## 2024-01-05 RX ORDER — LORAZEPAM 1 MG/1
TABLET ORAL
COMMUNITY
Start: 2023-12-12

## 2024-01-16 ENCOUNTER — TELEPHONE (OUTPATIENT)
Dept: HEMATOLOGY ONCOLOGY | Facility: CLINIC | Age: 79
End: 2024-01-16

## 2024-01-16 NOTE — TELEPHONE ENCOUNTER
Lab Inquiry   Who are you speaking with? Patient     If it is not the patient, are they listed on an active communication consent form? N/A   Name of ordering provider Angelic Parekh PA-C   What is being requested? Labs order be mailed to her   Lab draw [facility] ([facility])   What is the best call back number? [phone] please call when lab orders are mailed

## 2024-02-12 ENCOUNTER — TELEPHONE (OUTPATIENT)
Dept: HEMATOLOGY ONCOLOGY | Facility: CLINIC | Age: 79
End: 2024-02-12

## 2024-02-12 NOTE — TELEPHONE ENCOUNTER
Patient Call    Who are you speaking with? Patient    If it is not the patient, are they listed on an active communication consent form? N/A   What is the reason for this call? Patient calling to iza Atwood virtual appt for 2/13/24 @ 10:00am is okay please call [phone].  She also would like a callback to RS her inj infusion appt 2/13/24 @ 10:30am   Does this require a call back? yes   If a call back is required, please list best call back number [phone]   If a call back is required, advise that a message will be forwarded to their care team and someone will return their call as soon as possible.   Did you relay this information to the patient? Yes

## 2024-02-13 ENCOUNTER — HOSPITAL ENCOUNTER (OUTPATIENT)
Dept: INFUSION CENTER | Facility: CLINIC | Age: 79
End: 2024-02-13

## 2024-02-13 ENCOUNTER — TELEMEDICINE (OUTPATIENT)
Dept: HEMATOLOGY ONCOLOGY | Facility: CLINIC | Age: 79
End: 2024-02-13
Payer: MEDICARE

## 2024-02-13 DIAGNOSIS — Z17.0 MALIGNANT NEOPLASM OF UPPER-OUTER QUADRANT OF LEFT BREAST IN FEMALE, ESTROGEN RECEPTOR POSITIVE: ICD-10-CM

## 2024-02-13 DIAGNOSIS — C50.412 MALIGNANT NEOPLASM OF UPPER-OUTER QUADRANT OF LEFT BREAST IN FEMALE, ESTROGEN RECEPTOR POSITIVE: ICD-10-CM

## 2024-02-13 DIAGNOSIS — E83.52 SERUM CALCIUM ELEVATED: Primary | ICD-10-CM

## 2024-02-13 PROCEDURE — 99214 OFFICE O/P EST MOD 30 MIN: CPT | Performed by: PHYSICIAN ASSISTANT

## 2024-02-14 ENCOUNTER — TELEPHONE (OUTPATIENT)
Dept: HEMATOLOGY ONCOLOGY | Facility: CLINIC | Age: 79
End: 2024-02-14

## 2024-02-15 ENCOUNTER — HOSPITAL ENCOUNTER (OUTPATIENT)
Dept: INFUSION CENTER | Facility: CLINIC | Age: 79
Discharge: HOME | End: 2024-02-15
Payer: MEDICARE

## 2024-02-15 VITALS
HEART RATE: 84 BPM | RESPIRATION RATE: 18 BRPM | TEMPERATURE: 97.7 F | DIASTOLIC BLOOD PRESSURE: 61 MMHG | SYSTOLIC BLOOD PRESSURE: 151 MMHG

## 2024-02-15 DIAGNOSIS — M85.852 OSTEOPENIA OF LEFT HIP: ICD-10-CM

## 2024-02-15 DIAGNOSIS — C50.412 MALIGNANT NEOPLASM OF UPPER-OUTER QUADRANT OF LEFT BREAST IN FEMALE, ESTROGEN RECEPTOR POSITIVE: Primary | ICD-10-CM

## 2024-02-15 DIAGNOSIS — Z17.0 MALIGNANT NEOPLASM OF UPPER-OUTER QUADRANT OF LEFT BREAST IN FEMALE, ESTROGEN RECEPTOR POSITIVE: Primary | ICD-10-CM

## 2024-02-15 PROCEDURE — 96372 THER/PROPH/DIAG INJ SC/IM: CPT

## 2024-02-15 RX ADMIN — DENOSUMAB 60 MG: 60 INJECTION SUBCUTANEOUS at 12:13

## 2024-02-15 NOTE — PROGRESS NOTES
Pt presents for prolia administered in VLAD[facility]. No new meds or concerns. Pt tolerated treatment without adverse reaction. Future appointments discussed, confirmed with patient for 8/20/2024 1030. AVS declined.

## 2024-02-21 PROBLEM — Z01.419 ENCOUNTER FOR ANNUAL ROUTINE GYNECOLOGICAL EXAMINATION: Status: RESOLVED | Noted: 2023-06-08 | Resolved: 2024-02-21

## 2024-02-21 NOTE — PROGRESS NOTES
Virtual Regular Visit    Verification of patient location:    Patient is located at Home in the following state in which I hold an active license [place]      Assessment/Plan:    Problem List Items Addressed This Visit          Other    Malignant neoplasm of upper-outer quadrant of left breast in female, estrogen receptor positive     Relevant Orders    CBC and differential    Comprehensive metabolic panel    Cancer antigen 27.29     Other Visit Diagnoses       Serum calcium elevated    -  Primary    Relevant Orders    Comprehensive metabolic panel    PTH, intact                 Reason for visit is No chief complaint on file.       Encounter provider Angelic Parekh PA-C    Provider located at [address] [place] [address] [facility] [address] [place] [address]      Recent Visits  Date Type Provider Dept   02/14/24 Telephone Angelic Parekh PA-C [facility] [place]   Showing recent visits within past 7 days and meeting all other requirements  Future Appointments  No visits were found meeting these conditions.  Showing future appointments within next 150 days and meeting all other requirements       The patient was identified by name and date of birth. Aliyah A Mable was informed that this is a telemedicine visit and that the visit is being conducted through Telephone.  My office door was closed. No one else was in the room.  She acknowledged consent and understanding of privacy and security of the video platform. The patient has agreed to participate and understands they can discontinue the visit at any time.    Patient is aware this is a billable service.     Subjective  Aliyah DICKERSON Mable is a 78 y.o. female     Oncology History   Malignant neoplasm of upper-outer quadrant of left breast in female, estrogen receptor positive    5/30/2019 Initial Diagnosis    Breast cancer (HCC)     5/30/2019 Biopsy    Left breast, ultrasound-guided biopsy,  200 8cmfn 4 passes 12g Marquee:  - Invasive mammary carcinoma of no special type (ductal, not otherwise specified).  - grade 2  - %  - %  - HER2 by FISH negative     6/2019 Genetic Testing    BRCA negative     7/8/2019 -  Cancer Staged    Staging form: Breast, AJCC 8th Edition  - Clinical: Stage IB (cT2, cN0, cM0, G2, ER+, NV+, HER2-) - Signed by Joya Chester MD on 7/8/2019  Laterality: Left  Method of lymph node assessment: Clinical  Histologic grading system: 3 grade system       7/26/2019 Surgery    Left breast lumpectomy with SLN biopsy:  - Invasive mammary carcinoma, 2.7 cm, grade 2  - approximately 80% of tumor is DCIS  - LN's - one lymph node with metastatic carcinoma, 2.1mm deposit, no extranodal extention  - + LVI  - margins negative     8/12/2019 -  Cancer Staged    Staging form: Breast, AJCC 8th Edition  - Pathologic: Stage IB (pT2, pN1(sn), cM0, G2, ER+, NV+, HER2-) - Signed by Joya Chester MD on 8/12/2019  Neoadjuvant therapy: No  Laterality: Left  Method of lymph node assessment: [place] lymph node biopsy  Histologic grading system: 3 grade system       9/25/2019 - 10/23/2019 Radiation    Plan ID Energy Fractions Dose per Fraction (cGy) Dose Correction (cGy) Total Dose Delivered (cGy) Elapsed Days   BH L Breast 6X 16 / 16 266 0 4,256 21   L Brst Boost 12E 5 / 5 200 0 1,000 6     Dr Dunbar     9/2019 -  Hormone Therapy    Anastrozole 1 mg daily      Ovarian cancer (HCC)   2005 Initial Diagnosis    Ovarian cancer (HCC)     2005 Surgery    MARCO/BSO    Dr England     2005 -  Chemotherapy    Dr England        78 -year-old female (ECOG 0) with history of invasive mammary carcinoma who is status post lumpectomy and sentinel lymph node sampling.  Patient's tumor measured 2.7 cm, grade 2 of 3.  Approximately 80% of tumor is DCIS.     3 sentinel lymph nodes were removed.  1 had focus of metastatic carcinoma measuring 2.1 mm; however negative for extranodal extension.     Patient's anatomic stage is  "at least stage IIB, T2, N1 a, C M0, G2.  %, %, her 2-by FISH     She had postoperative complications with hematoma formation for which she has been following Surgical Oncology.  She states that she continues to apply warm compresses to the area and it is improving.  She has follow-up with Surgical Oncology for routine visit in November 2019.      Following surgery patient was noted to have very slight invasion into 1 lymph node, stage IIB.  She was offered chemotherapy.  He was to proceed with aromatase inhibitor alone.     She is on anastrozole 1 mg.      Patient has had genetic testing in the past due to history of ovarian cancer and is negative.      She has been on Prolia 60 mg every 6 months with first dose on 1/13/20.     Past Medical History:   Diagnosis Date    Basal cell carcinoma     Bulbar polio     Cataract     Colon polyp     Curvature of spine     Exercise involving walking     \"at work alot\"    History of anemia as a child     \"after polio\"    History of radiation therapy 2019    left breast    Lymphedema     left leg/\"after abdominal lymph nodes removed\"/wears thigh high compresion stocking\"    Macular degeneration     \"and slight glaucoma per eye doctor both eyes\"    Muscle weakness     \"from polio, predominantly right side\"    Osteopenia     Osteoporosis     Ovarian cancer (HCC) 2005    radical hysterectomy w/ chemo    Scoliosis     with right hip pain occas    Swallowing difficulty     \"at times since polio\"    Thyroid nodule     Wears glasses        Past Surgical History:   Procedure Laterality Date    ABDOMINAL HYSTERECTOMY N/A     ovarian cancer    BOWEL RESECTION      BREAST BIOPSY Left 01/12/2015    benign    BREAST BIOPSY Right 06/11/2014    benign    BREAST BIOPSY Left 2007    benign    BREAST BIOPSY Left 05/30/2019    [facility]    BREAST LUMPECTOMY Left 07/26/2019    Procedure: LUMPECTOMY BREAST NEEDLE LOC at 1100;  Surgeon: Joya Chester MD;  Location: [facility];  Service: Surgical " Oncology    CATARACT EXTRACTION Right     CATARACT EXTRACTION Left 2021     SECTION      COLONOSCOPY      HYSTERECTOMY      age 60    LYMPH NODE BIOPSY Left 2019    Procedure: SENTINEL NODE BIOPSY- NUC MED INJ at 1200;  Surgeon: Joya Chester MD;  Location: [facility];  Service: Surgical Oncology    LYMPHADENECTOMY      MAMMO NEEDLE LOCALIZATION LEFT (ALL INC) Left 2019    SKIN CANCER EXCISION      US GUIDED BREAST BIOPSY LEFT COMPLETE Left 2019       Current Outpatient Medications   Medication Sig Dispense Refill    anastrozole (ARIMIDEX) 1 mg tablet Take 1 tablet (1 mg total) by mouth daily 90 tablet 3    aspirin 81 MG tablet Take by mouth      Calcium Citrate-Vitamin D 250-200 MG-UNIT TABS Take by mouth      Denosumab (PROLIA SC) Inject under the skin every 6 (six) months      HM VITAMIN D3 2000 units CAPS Take by mouth in the morning      LORazepam (ATIVAN) 1 mg tablet 1 pill 40 min prior to MRI tspine      multivitamin (THERAGRAN) TABS Take 1 tablet by mouth daily      pyridoxine (VITAMIN B6) 100 mg tablet Take by mouth      spironolactone (ALDACTONE) 50 mg tablet Take 50 mg by mouth daily       No current facility-administered medications for this visit.        Allergies   Allergen Reactions    Metoclopramide Myalgia     Reaction Date: 2011; Annotation - 2014: hand cramps and biting of cheeks       Review of Systems   Constitutional:  Negative for appetite change, chills, fatigue, fever and unexpected weight change.   Respiratory:  Negative for cough and shortness of breath.    Cardiovascular:  Negative for chest pain, palpitations and leg swelling.   Gastrointestinal:  Negative for abdominal pain, constipation, diarrhea, nausea and vomiting.   Genitourinary:  Negative for difficulty urinating, dysuria and hematuria.   Musculoskeletal:  Positive for arthralgias and back pain.   Skin: Negative.    Neurological:  Negative for dizziness, weakness, light-headedness,  numbness and headaches.   Hematological: Negative.    Psychiatric/Behavioral: Negative.       1. Serum calcium elevated  Recommend repeat labs due to very mild elevation in calcium.    - Comprehensive metabolic panel; Future  - PTH, intact; Future    2. Malignant neoplasm of upper-outer quadrant of left breast in female, estrogen receptor positive   78-year-old female presents for follow-up visit guarding history of breast cancer diagnosed in 2019.  She remains on adjuvant anastrozole.  She tolerates well.  She will remain on for minimum of 5 years.  She is on calcium and vitamin D.  She is on Prolia every 6 months for her osteoporosis and also tolerating this well.  She reviewed issues she has been having with her back as well as hip.  Reviewed that if she were to have continued pain to let us know especially if this is not being worked up though she is following closely with pain specialist at [facility].  She states that currently back is being addressed and then they will be addressing the hip pain.    - CBC and differential; Future  - Comprehensive metabolic panel; Future  - Cancer antigen 27.29; Future     Visit Time  Total Visit Duration: 20 min

## 2024-06-12 NOTE — PROGRESS NOTES
Assessment/Plan:  Ovarian Ca - MARCO/BSO with Chemo 60 '05                                                        Osteopenia - LFN BMD 11/21 T-1.8 [FRAX 3.6/14 %]; improved from - 2.1 '19. Prolia since '19 L Breast Ca - s/p Lumpectomy and Rads. Anastrozole. BRCA neg. Follows w Dr. Chester      RTO 1 yr.                                                                                    SBA monthly  3 D Mammography   Colonoscopy  21  Exercise 3/wk                  Calcium 1,200 mg/d with Vit D      Depression Screen: Neg       Diagnoses and all orders for this visit:    Malignant neoplasm of ovary, unspecified laterality (HCC)    Malignant neoplasm of upper-outer quadrant of left breast in female, estrogen receptor positive (HCC)    Use of anastrozole    BRCA negative    Osteopenia of left hip    Other orders  -     celecoxib (CeleBREX) 200 mg capsule; Take 200 mg by mouth 2 (two) times a day  -     Neurontin 600 MG tablet; Take 600 mg by mouth daily              Subjective:        Patient ID: Aliyah Akins is a 78 y.o. female.    Aliyah returns for a follow-up visit.  She had ovarian cancer in 2005.  She has no complaints of abdominal pain, bloating, change in bowel or urinary habits.  She denies any vaginal bleeding.  She is not sexually active.  She had left breast cancer in 2019 requiring a left lumpectomy and radiation therapy.  She is in her fifth year of Arimidex.  The oncologist will determine whether or not this needs to be continued. She sees an oncologist and breast surgeon twice a year as follow-up for the breast cancer.  A mammogram is scheduled for next week.  She no longer requires colonoscopy.  She is being treated for osteoporosis using Prolia, calcium, and exercise.    Since January she has been suffering from bilateral sciatica.  She has had injections which failed.  Gabapentin and Celebrex have provided some relief but inadequate.  She has another injection coming up using a different angle in  an attempt to provide relief.    Dr. Chester did not believe the subcutaneous nodule on the xiphoid should be removed.        The following portions of the patient's history were reviewed and updated as appropriate: She  has a past medical history of Basal cell carcinoma, Bulbar polio, Cataract, Colon polyp, Curvature of spine, Exercise involving walking, History of anemia as a child, History of radiation therapy (), Lymphedema, Macular degeneration, Muscle weakness, Osteopenia, Osteoporosis, Ovarian cancer (HCC) (), Scoliosis, Swallowing difficulty, Thyroid nodule, and Wears glasses.  Patient Active Problem List    Diagnosis Date Noted    Skin cyst 2023    Dense breast tissue 2020    Osteopenia of left hip 2019    Use of anastrozole 2019    Ovarian cancer (HCC)     BRCA negative 2019    Malignant neoplasm of upper-outer quadrant of left breast in female, estrogen receptor positive (HCC) 2019   PMH:  Polio 7 - R side weaker than L  Menarche 9  G3, ;  SAVD M '69, Inc Ab D&E ', Twins/fraternal F's - SAVD, C/S for Prolapsed Cord/Breech due to an Arcuate Uterus [Dr. Izaguirre]  Menopause  HRT  R Nose Basal Cell Ca   Ovarian Ca -radical hysterectomy with chemotherapy 60   R Cataract removed   Left breast cancer - lumpectomy with radiation .  Followed by Dr. Chester.  On Arimidex and Prolia  Macular degeneration with glaucoma  Cataracts - L removed   L Index finger - Squamous Cell Ca 3/23  Stress test for chest pain   She  has a past surgical history that includes Abdominal hysterectomy (N/A); Bowel resection; Colonoscopy; Lymphadenectomy; Skin cancer excision; Hysterectomy; US guided breast biopsy left complete (Left, 2019); Cataract extraction (Right, );  section; Mammo needle localization left (all inc) (Left, 2019); Lymph node biopsy (Left, 2019); Breast lumpectomy (Left, 2019); Breast biopsy (Left, 2015); Breast  biopsy (Right, 06/11/2014); Breast biopsy (Left, 2007); Breast biopsy (Left, 05/30/2019); and Cataract extraction (Left, 02/2021).  Her family history includes Breast cancer (age of onset: 79) in her mother; Cancer in her paternal grandfather; Cancer (age of onset: 61) in her maternal grandfather; Colon cancer (age of onset: 69) in her maternal uncle; Heart failure in her mother; Hypertension in her father; Lung cancer (age of onset: 50) in her maternal aunt; No Known Problems in her brother, brother, daughter, daughter, maternal grandmother, paternal aunt, paternal aunt, paternal aunt, and paternal grandmother; Stomach cancer (age of onset: 52) in her maternal aunt; Stroke in her father.  She  reports that she quit smoking about 19 years ago. Her smoking use included cigarettes. She started smoking about 64 years ago. She has a 22.5 pack-year smoking history. She has never used smokeless tobacco. She reports current alcohol use of about 1.0 standard drink of alcohol per week. She reports that she does not use drugs.  SH:   to [place]. Worked for the [employer], retired 2/23.  has Bladder Ca and other health issues.  Current Outpatient Medications   Medication Sig Dispense Refill    anastrozole (ARIMIDEX) 1 mg tablet Take 1 tablet (1 mg total) by mouth daily 90 tablet 3    aspirin 81 MG tablet Take by mouth      Calcium Citrate-Vitamin D 250-200 MG-UNIT TABS Take by mouth      celecoxib (CeleBREX) 200 mg capsule Take 200 mg by mouth 2 (two) times a day      Denosumab (PROLIA SC) Inject under the skin every 6 (six) months      HM VITAMIN D3 2000 units CAPS Take by mouth in the morning      multivitamin (THERAGRAN) TABS Take 1 tablet by mouth daily      Neurontin 600 MG tablet Take 600 mg by mouth daily      pyridoxine (VITAMIN B6) 100 mg tablet Take by mouth      spironolactone (ALDACTONE) 50 mg tablet Take 50 mg by mouth daily      LORazepam (ATIVAN) 1 mg tablet 1 pill 40 min prior to MRI  tspine (Patient not taking: Reported on 6/13/2024)       No current facility-administered medications for this visit.     Current Outpatient Medications on File Prior to Visit   Medication Sig    anastrozole (ARIMIDEX) 1 mg tablet Take 1 tablet (1 mg total) by mouth daily    aspirin 81 MG tablet Take by mouth    Calcium Citrate-Vitamin D 250-200 MG-UNIT TABS Take by mouth    celecoxib (CeleBREX) 200 mg capsule Take 200 mg by mouth 2 (two) times a day    Denosumab (PROLIA SC) Inject under the skin every 6 (six) months    HM VITAMIN D3 2000 units CAPS Take by mouth in the morning    multivitamin (THERAGRAN) TABS Take 1 tablet by mouth daily    Neurontin 600 MG tablet Take 600 mg by mouth daily    pyridoxine (VITAMIN B6) 100 mg tablet Take by mouth    spironolactone (ALDACTONE) 50 mg tablet Take 50 mg by mouth daily    LORazepam (ATIVAN) 1 mg tablet 1 pill 40 min prior to MRI tspine (Patient not taking: Reported on 6/13/2024)     No current facility-administered medications on file prior to visit.     She is allergic to metoclopramide..    Review of Systems   Constitutional:  Positive for fatigue. Negative for activity change, appetite change and unexpected weight change.   Eyes:  Negative for visual disturbance.   Respiratory:  Negative for cough, chest tightness, shortness of breath and wheezing.    Cardiovascular:  Positive for chest pain and leg swelling (Right-sided due to lymphadenectomy for ovarian cancer). Negative for palpitations.        Breast: Patient denies tenderness, nipple discharge, masses, or erythema.   Gastrointestinal:  Negative for abdominal distention, abdominal pain, blood in stool, constipation, diarrhea, nausea and vomiting.   Endocrine: Negative for cold intolerance and heat intolerance.   Genitourinary:  Negative for decreased urine volume, difficulty urinating, dysuria, frequency, hematuria, menstrual problem, pelvic pain, urgency, vaginal bleeding, vaginal discharge and vaginal pain.        " Rare stress incontinence.  Not sexually active.   Musculoskeletal:  Positive for arthralgias (Hands, knees, and ankles.).   Skin:  Negative for rash.   Neurological:  Positive for weakness (Right-sided from polio as a child.). Negative for light-headedness, numbness and headaches.   Hematological:  Does not bruise/bleed easily.   Psychiatric/Behavioral:  Negative for agitation, behavioral problems and sleep disturbance. The patient is not nervous/anxious.          Objective:    Vitals:    06/13/24 1008   BP: 128/64   BP Location: Left arm   Patient Position: Sitting   Cuff Size: Standard   Weight: 55.6 kg (122 lb 9.6 oz)   Height: 5' 2\" (1.575 m)            Physical Exam  Vitals and nursing note reviewed.   Constitutional:       Appearance: Normal appearance. She is well-developed.   HENT:      Head: Normocephalic and atraumatic.   Eyes:      General: No scleral icterus.        Right eye: No discharge.         Left eye: No discharge.      Extraocular Movements: Extraocular movements intact.      Conjunctiva/sclera: Conjunctivae normal.   Neck:      Thyroid: No thyromegaly.      Trachea: No tracheal deviation.   Cardiovascular:      Rate and Rhythm: Normal rate and regular rhythm.      Heart sounds: Normal heart sounds. No murmur heard.  Pulmonary:      Effort: Pulmonary effort is normal. No respiratory distress.      Breath sounds: Normal breath sounds. No wheezing.   Chest:   Breasts:     Breasts are symmetrical.      Right: No inverted nipple, mass, nipple discharge, skin change or tenderness.      Left: No inverted nipple, mass, nipple discharge, skin change or tenderness.          Comments: 2-1/2 cm sebaceous cyst over xiphoid.  Dr. Chester plans on removing when patient desires.  Abdominal:      General: Abdomen is flat. Bowel sounds are normal. There is no distension.      Palpations: Abdomen is soft. There is no mass.      Tenderness: There is no abdominal tenderness. There is no guarding.      Hernia: No " hernia is present.      Comments: Numerous scars from  section, laparotomy for ovarian cancer, port sites   Genitourinary:     General: Normal vulva.      Labia:         Right: No rash, tenderness or lesion.         Left: No rash, tenderness or lesion.       Vagina: Normal.      Adnexa:         Right: No mass, tenderness or fullness.          Left: No mass, tenderness or fullness.        Rectum: No external hemorrhoid.      Comments: Urethral meatus within normal limits.  Perineum within normal limits.  Bladder well supported.   Uterus and cervix surgically removed.  Physiologic vaginal atrophy.  Vaginal narrowing.  Normal apex.  Musculoskeletal:         General: Normal range of motion.      Cervical back: Normal range of motion and neck supple.   Lymphadenopathy:      Upper Body:      Right upper body: No supraclavicular, axillary or pectoral adenopathy.      Left upper body: No supraclavicular, axillary or pectoral adenopathy.   Skin:     General: Skin is warm and dry.   Neurological:      Mental Status: She is alert and oriented to person, place, and time.   Psychiatric:         Mood and Affect: Mood normal.         Behavior: Behavior normal.         Thought Content: Thought content normal.         Judgment: Judgment normal.

## 2024-06-13 ENCOUNTER — ANNUAL EXAM (OUTPATIENT)
Dept: OBGYN CLINIC | Facility: CLINIC | Age: 79
End: 2024-06-13
Payer: MEDICARE

## 2024-06-13 VITALS
BODY MASS INDEX: 22.56 KG/M2 | WEIGHT: 122.6 LBS | DIASTOLIC BLOOD PRESSURE: 64 MMHG | HEIGHT: 62 IN | SYSTOLIC BLOOD PRESSURE: 128 MMHG

## 2024-06-13 DIAGNOSIS — C56.9 MALIGNANT NEOPLASM OF OVARY, UNSPECIFIED LATERALITY (HCC): Primary | ICD-10-CM

## 2024-06-13 DIAGNOSIS — Z79.811 USE OF ANASTROZOLE: ICD-10-CM

## 2024-06-13 DIAGNOSIS — M85.852 OSTEOPENIA OF LEFT HIP: ICD-10-CM

## 2024-06-13 DIAGNOSIS — Z17.0 MALIGNANT NEOPLASM OF UPPER-OUTER QUADRANT OF LEFT BREAST IN FEMALE, ESTROGEN RECEPTOR POSITIVE (HCC): ICD-10-CM

## 2024-06-13 DIAGNOSIS — Z13.71 BRCA NEGATIVE: ICD-10-CM

## 2024-06-13 DIAGNOSIS — C50.412 MALIGNANT NEOPLASM OF UPPER-OUTER QUADRANT OF LEFT BREAST IN FEMALE, ESTROGEN RECEPTOR POSITIVE (HCC): ICD-10-CM

## 2024-06-13 PROCEDURE — 99213 OFFICE O/P EST LOW 20 MIN: CPT | Performed by: OBSTETRICS & GYNECOLOGY

## 2024-06-13 RX ORDER — CELECOXIB 200 MG/1
200 CAPSULE ORAL 2 TIMES DAILY
COMMUNITY
Start: 2024-05-23 | End: 2025-05-23

## 2024-06-13 RX ORDER — GABAPENTIN 600 MG
600 TABLET ORAL DAILY
COMMUNITY
Start: 2024-05-23 | End: 2025-05-23

## 2024-06-18 ENCOUNTER — HOSPITAL ENCOUNTER (OUTPATIENT)
Dept: MAMMOGRAPHY | Facility: CLINIC | Age: 79
Discharge: HOME | End: 2024-06-18
Payer: MEDICARE

## 2024-06-18 VITALS — WEIGHT: 122 LBS | BODY MASS INDEX: 22.45 KG/M2 | HEIGHT: 62 IN

## 2024-06-18 DIAGNOSIS — C50.412 MALIGNANT NEOPLASM OF UPPER-OUTER QUADRANT OF LEFT BREAST IN FEMALE, ESTROGEN RECEPTOR POSITIVE (HCC): ICD-10-CM

## 2024-06-18 DIAGNOSIS — Z17.0 MALIGNANT NEOPLASM OF UPPER-OUTER QUADRANT OF LEFT BREAST IN FEMALE, ESTROGEN RECEPTOR POSITIVE (HCC): ICD-10-CM

## 2024-06-18 PROCEDURE — 77066 DX MAMMO INCL CAD BI: CPT

## 2024-06-18 PROCEDURE — G0279 TOMOSYNTHESIS, MAMMO: HCPCS

## 2024-07-11 ENCOUNTER — OFFICE VISIT (OUTPATIENT)
Dept: SURGICAL ONCOLOGY | Facility: CLINIC | Age: 79
End: 2024-07-11
Payer: MEDICARE

## 2024-07-11 ENCOUNTER — TELEPHONE (OUTPATIENT)
Dept: SURGICAL ONCOLOGY | Facility: CLINIC | Age: 79
End: 2024-07-11

## 2024-07-11 ENCOUNTER — TELEPHONE (OUTPATIENT)
Age: 79
End: 2024-07-11

## 2024-07-11 VITALS
RESPIRATION RATE: 16 BRPM | DIASTOLIC BLOOD PRESSURE: 70 MMHG | WEIGHT: 121 LBS | BODY MASS INDEX: 22.26 KG/M2 | TEMPERATURE: 97.4 F | HEART RATE: 69 BPM | SYSTOLIC BLOOD PRESSURE: 120 MMHG | OXYGEN SATURATION: 94 % | HEIGHT: 62 IN

## 2024-07-11 DIAGNOSIS — Z13.71 BRCA NEGATIVE: ICD-10-CM

## 2024-07-11 DIAGNOSIS — C50.412 MALIGNANT NEOPLASM OF UPPER-OUTER QUADRANT OF LEFT BREAST IN FEMALE, ESTROGEN RECEPTOR POSITIVE (HCC): Primary | ICD-10-CM

## 2024-07-11 DIAGNOSIS — Z12.31 VISIT FOR SCREENING MAMMOGRAM: ICD-10-CM

## 2024-07-11 DIAGNOSIS — Z79.811 USE OF ANASTROZOLE: ICD-10-CM

## 2024-07-11 DIAGNOSIS — Z17.0 MALIGNANT NEOPLASM OF UPPER-OUTER QUADRANT OF LEFT BREAST IN FEMALE, ESTROGEN RECEPTOR POSITIVE (HCC): Primary | ICD-10-CM

## 2024-07-11 PROCEDURE — 99214 OFFICE O/P EST MOD 30 MIN: CPT

## 2024-07-11 NOTE — TELEPHONE ENCOUNTER
Called patient to let her know that a copy of her AVS -Appt. With LINCOLN Grewal were being mailed to her today.  She was appreciative.-According to patient the computer was not working correctly when she left.She will get a copy now.

## 2024-07-11 NOTE — PROGRESS NOTES
Surgical Oncology Follow Up       [address]  [facility] SURGICAL ONCOLOGY [facility]  [address]  [facility] [address]    Aliyah DICKERSON Mable  1945  [id number]  [address]  [facility] SURGICAL ONCOLOGY [facility]  [address]  [facility] [address]    Chief Complaint   Patient presents with   • Follow-up       Assessment/Plan:  1. Malignant neoplasm of upper-outer quadrant of left breast in female, estrogen receptor positive (HCC)  - 1 year f/u    2. Use of anastrozole  - continue use per medical oncology    3. BRCA negative    4. Visit for screening mammogram  - Mammo screening bilateral w 3d & cad; Future       Discussion/Summary: Patient is an 78-year-old female presenting today for six-month follow-up for left breast cancer diagnosed in May of 2019. Pathology revealed invasive mammary carcinoma ER/%, HER2 fish negative. She underwent genetic testing which was negative. She had a left breast lumpectomy with sentinel node biopsy with Dr. Chester.  She had whole breast radiation therapy and is currently on anastrozole. She had a b/l dx mammogram on 6/18/24 which was BIRADS 2 category 3 density. There were no concerns on her cbe. This is her 5 year visit. I will see the patient back in 1 year or sooner should the need arise. She was instructed to call with any questions or concerns prior to this time. All questions were answered today.       History of Present Illness:     Oncology History   Malignant neoplasm of upper-outer quadrant of left breast in female, estrogen receptor positive (HCC)   5/30/2019 Initial Diagnosis    Breast cancer (HCC)     5/30/2019 Biopsy    Left breast, ultrasound-guided biopsy, 200 8cmfn 4 passes 12g Marquee:  - Invasive mammary carcinoma of no special type (ductal, not otherwise specified).  - grade 2  - %  - %  - HER2 by FISH negative     6/2019 Genetic Testing    BRCA negative     7/8/2019 -  Cancer Staged     Staging form: Breast, AJCC 8th Edition  - Clinical: Stage IB (cT2, cN0, cM0, G2, ER+, MN+, HER2-) - Signed by Joya Chester MD on 7/8/2019  Laterality: Left  Method of lymph node assessment: Clinical  Histologic grading system: 3 grade system       7/26/2019 Surgery    Left breast lumpectomy with SLN biopsy:  - Invasive mammary carcinoma, 2.7 cm, grade 2  - approximately 80% of tumor is DCIS  - LN's - one lymph node with metastatic carcinoma, 2.1mm deposit, no extranodal extention  - + LVI  - margins negative     8/12/2019 -  Cancer Staged    Staging form: Breast, AJCC 8th Edition  - Pathologic: Stage IB (pT2, pN1(sn), cM0, G2, ER+, MN+, HER2-) - Signed by Joya Chester MD on 8/12/2019  Neoadjuvant therapy: No  Laterality: Left  Method of lymph node assessment: [place] lymph node biopsy  Histologic grading system: 3 grade system       9/25/2019 - 10/23/2019 Radiation    Plan ID Energy Fractions Dose per Fraction (cGy) Dose Correction (cGy) Total Dose Delivered (cGy) Elapsed Days   BH L Breast 6X 16 / 16 266 0 4,256 21   L Brst Boost 12E 5 / 5 200 0 1,000 6     Dr Dunbar     9/2019 -  Hormone Therapy    Anastrozole 1 mg daily      Ovarian cancer (HCC)   2005 Initial Diagnosis    Ovarian cancer (HCC)     2005 Surgery    MARCO/BSO    Dr England     2005 -  Chemotherapy    Dr England          -Interval History: Patient is an 78-year-old female presenting today for six-month follow-up for left breast cancer diagnosed in May of 2019. She is currently on anastrozole and she is unsure if she is d/c soon. She sees med/onc next month. She had a b/l dx mammogram on 6/18/24 which was BIRADS 2 category 3 density. Patient denies changes on her breast exam. She denies persistent headaches, bone pain, back pain, shortness of breath, or abdominal pain.      Review of Systems:  Review of Systems   Constitutional:  Negative for activity change, appetite change, fatigue and unexpected weight change.   Respiratory:  Negative for cough and  "shortness of breath.    Cardiovascular:  Negative for chest pain.   Gastrointestinal:  Negative for abdominal pain, diarrhea, nausea and vomiting.   Endocrine: Negative for heat intolerance.   Musculoskeletal:  Negative for arthralgias, back pain and myalgias.   Skin:  Negative for rash.   Neurological:  Negative for weakness and headaches.   Hematological:  Negative for adenopathy.       Patient Active Problem List   Diagnosis   • Malignant neoplasm of upper-outer quadrant of left breast in female, estrogen receptor positive (HCC)   • BRCA negative   • Ovarian cancer (HCC)   • Use of anastrozole   • Osteopenia of left hip   • Dense breast tissue   • Skin cyst     Past Medical History:   Diagnosis Date   • Basal cell carcinoma    • Breast cancer (HCC) 07/26/2019    Left   • Bulbar polio    • Cataract    • Colon polyp    • Curvature of spine    • Exercise involving walking     \"at work alot\"   • History of anemia as a child     \"after polio\"   • History of radiation therapy 2019    left breast   • Lymphedema     left leg/\"after abdominal lymph nodes removed\"/wears thigh high compresion stocking\"   • Macular degeneration     \"and slight glaucoma per eye doctor both eyes\"   • Muscle weakness     \"from polio, predominantly right side\"   • Osteopenia    • Osteoporosis    • Ovarian cancer (HCC) 2005    radical hysterectomy w/ chemo   • Scoliosis     with right hip pain occas   • Swallowing difficulty     \"at times since polio\"   • Thyroid nodule    • Wears glasses      Past Surgical History:   Procedure Laterality Date   • ABDOMINAL HYSTERECTOMY N/A     ovarian cancer   • BOWEL RESECTION     • BREAST BIOPSY Left 01/12/2015    benign   • BREAST BIOPSY Right 06/11/2014    benign   • BREAST BIOPSY Left 2007    benign   • BREAST BIOPSY Left 05/30/2019    IDC   • BREAST LUMPECTOMY Left 07/26/2019    Procedure: LUMPECTOMY BREAST NEEDLE LOC at 1100;  Surgeon: Joya Chester MD;  Location: [facility];  Service: Surgical Oncology "   • CATARACT EXTRACTION Right    • CATARACT EXTRACTION Left 2021   •  SECTION     • COLONOSCOPY     • HYSTERECTOMY      age 60   • LYMPH NODE BIOPSY Left 2019    Procedure: SENTINEL NODE BIOPSY- NUC MED INJ at 1200;  Surgeon: Joya Chester MD;  Location: [facility];  Service: Surgical Oncology   • LYMPHADENECTOMY     • MAMMO NEEDLE LOCALIZATION LEFT (ALL INC) Left 2019   • SKIN CANCER EXCISION     • US GUIDED BREAST BIOPSY LEFT COMPLETE Left 2019     Family History   Problem Relation Age of Onset   • Heart failure Mother    • Breast cancer Mother 79   • Hypertension Father    • Stroke Father    • No Known Problems Brother    • No Known Problems Brother    • No Known Problems Daughter    • No Known Problems Daughter    • No Known Problems Maternal Grandmother    • Cancer Maternal Grandfather 61        chest and neck   • No Known Problems Paternal Grandmother    • Cancer Paternal Grandfather         unknown type and age   • Stomach cancer Maternal Aunt 52   • Lung cancer Maternal Aunt 50   • Colon cancer Maternal Uncle 69   • No Known Problems Paternal Aunt    • No Known Problems Paternal Aunt    • No Known Problems Paternal Aunt    • Ovarian cancer Neg Hx    • Cervical cancer Neg Hx    • Uterine cancer Neg Hx      Social History     Socioeconomic History   • Marital status: /Civil Union     Spouse name: Not on file   • Number of children: Not on file   • Years of education: Not on file   • Highest education level: Not on file   Occupational History   • Occupation:    Tobacco Use   • Smoking status: Former     Current packs/day: 0.00     Average packs/day: 0.5 packs/day for 45.0 years (22.5 ttl pk-yrs)     Types: Cigarettes     Start date:      Quit date:      Years since quittin.5   • Smokeless tobacco: Never   • Tobacco comments:     Quit   Vaping Use   • Vaping status: Never Used   Substance and Sexual Activity   • Alcohol use: Yes      Alcohol/week: 1.0 standard drink of alcohol     Types: 1 Glasses of wine per week     Comment: occasional   • Drug use: No   • Sexual activity: Not Currently     Partners: Male     Birth control/protection: Post-menopausal, Surgical     Comment:    Other Topics Concern   • Not on file   Social History Narrative   • Not on file     Social Determinants of Health     Financial Resource Strain: Not on file   Food Insecurity: Not on file   Transportation Needs: Not on file   Physical Activity: Not on file   Stress: Not on file   Social Connections: Not on file   Intimate Partner Violence: Not on file   Housing Stability: Not on file       Current Outpatient Medications:   •  anastrozole (ARIMIDEX) 1 mg tablet, Take 1 tablet (1 mg total) by mouth daily, Disp: 90 tablet, Rfl: 3  •  aspirin 81 MG tablet, Take by mouth, Disp: , Rfl:   •  Calcium Citrate-Vitamin D 250-200 MG-UNIT TABS, Take by mouth, Disp: , Rfl:   •  celecoxib (CeleBREX) 200 mg capsule, Take 200 mg by mouth 2 (two) times a day, Disp: , Rfl:   •  Denosumab (PROLIA SC), Inject under the skin every 6 (six) months, Disp: , Rfl:   •  HM VITAMIN D3 2000 units CAPS, Take by mouth in the morning, Disp: , Rfl:   •  multivitamin (THERAGRAN) TABS, Take 1 tablet by mouth daily, Disp: , Rfl:   •  Neurontin 600 MG tablet, Take 600 mg by mouth daily, Disp: , Rfl:   •  pyridoxine (VITAMIN B6) 100 mg tablet, Take by mouth, Disp: , Rfl:   •  spironolactone (ALDACTONE) 50 mg tablet, Take 50 mg by mouth daily, Disp: , Rfl:   •  LORazepam (ATIVAN) 1 mg tablet, 1 pill 40 min prior to MRI tspine (Patient not taking: Reported on 6/13/2024), Disp: , Rfl:   Allergies   Allergen Reactions   • Metoclopramide Myalgia     Reaction Date: 03Jun2011; Annotation - 24Nov2014: hand cramps and biting of cheeks     There were no vitals filed for this visit.    Physical Exam  Constitutional:       General: She is not in acute distress.     Appearance: Normal appearance.   Cardiovascular:       Rate and Rhythm: Normal rate and regular rhythm.      Pulses: Normal pulses.      Heart sounds: Normal heart sounds.   Pulmonary:      Effort: Pulmonary effort is normal.      Breath sounds: Normal breath sounds.   Chest:      Chest wall: No mass.   Breasts:     Right: No swelling, bleeding, inverted nipple, mass, nipple discharge, skin change or tenderness.      Left: No swelling, bleeding, inverted nipple, mass, nipple discharge, skin change or tenderness.   Abdominal:      General: Abdomen is flat.      Palpations: Abdomen is soft.   Lymphadenopathy:      Upper Body:      Right upper body: No supraclavicular, axillary or pectoral adenopathy.      Left upper body: No supraclavicular, axillary or pectoral adenopathy.   Skin:     General: Skin is warm.   Neurological:      General: No focal deficit present.      Mental Status: She is alert and oriented to person, place, and time.   Psychiatric:         Mood and Affect: Mood normal.         Behavior: Behavior normal.           Results:    Imaging  Mammo diagnostic bilateral w 3d & cad    Result Date: 6/18/2024  Narrative: DIAGNOSIS: Malignant neoplasm of upper-outer quadrant of left breast in female, estrogen receptor positive (HCC) TECHNIQUE: Digital diagnostic mammography was performed. Computer Aided Detection (CAD) analyzed all applicable images. COMPARISONS: Prior breast imaging dated: 06/16/2023 and 06/15/2022 RELEVANT HISTORY: Family Breast Cancer History: History of breast cancer in Mother. Family Medical History: Family medical history includes breast cancer in mother and colon cancer in maternal uncle. Personal History: Hormone history includes estrogen replacement therapy and other. Surgical history includes breast biopsy, lumpectomy, and hysterectomy. Medical history includes breast cancer and ovarian cancer. RISK ASSESSMENT: Tyrer-Cuzick risk assessment reporting was suppressed due to the patient's history and/or demographic factors. TISSUE DENSITY:  The breasts are heterogeneously dense, which may obscure small masses. INDICATION: Aliyah Akins is a 78 y.o. female presenting for annual. FINDINGS: Bilateral There are no suspicious masses, grouped microcalcifications or areas of unexplained architectural distortion. The skin and nipple areolar complex are unremarkable.  Stable postsurgical changes are present in the left breast.     Impression:  No findings of malignancy in either breast.  Stable expected postsurgical changes are present in the left breast. ASSESSMENT/BI-RADS CATEGORY:  Overall: 2 - Benign RECOMMENDATION:      - Return to routine screening for both breasts. Workstation ID: [id number]       I reviewed the above imaging data.      Advance Care Planning/Advance Directives:  Discussed disease status, cancer treatment plans and/or cancer treatment goals with the patient.

## 2024-07-12 ENCOUNTER — TELEPHONE (OUTPATIENT)
Dept: SURGICAL ONCOLOGY | Facility: CLINIC | Age: 79
End: 2024-07-12

## 2024-08-15 ENCOUNTER — TELEPHONE (OUTPATIENT)
Dept: HEMATOLOGY ONCOLOGY | Facility: CLINIC | Age: 79
End: 2024-08-15

## 2024-08-15 NOTE — TELEPHONE ENCOUNTER
SPOKE WITH PT REGARDING LABS, PT GOES TO \[facility]\"" FOR LABS, SHE WENT YESTERDAY FOR LABS, WILL CALL TOMORROW TO HAVE LABS FAXED OVER IF THEY DO NOT CROSSOVER.

## 2024-08-16 ENCOUNTER — TELEPHONE (OUTPATIENT)
Dept: HEMATOLOGY ONCOLOGY | Facility: CLINIC | Age: 79
End: 2024-08-16

## 2024-08-16 NOTE — TELEPHONE ENCOUNTER
SPOKE WITH AMY AT [facility] REQUESTING LABS BE FAXED OVER, CA27.29 STILL PENDING, CPMP AND CBCD WILL BE FAXED OVER. WILL CHECK BACK ON MONDAY FOR CA27.29

## 2024-08-19 ENCOUNTER — TELEPHONE (OUTPATIENT)
Dept: HEMATOLOGY ONCOLOGY | Facility: CLINIC | Age: 79
End: 2024-08-19

## 2024-08-20 ENCOUNTER — HOSPITAL ENCOUNTER (OUTPATIENT)
Dept: INFUSION CENTER | Facility: CLINIC | Age: 79
Discharge: HOME | End: 2024-08-20
Payer: MEDICARE

## 2024-08-20 ENCOUNTER — OFFICE VISIT (OUTPATIENT)
Dept: HEMATOLOGY ONCOLOGY | Facility: CLINIC | Age: 79
End: 2024-08-20
Payer: MEDICARE

## 2024-08-20 VITALS
BODY MASS INDEX: 22.54 KG/M2 | WEIGHT: 122.5 LBS | HEART RATE: 75 BPM | DIASTOLIC BLOOD PRESSURE: 60 MMHG | TEMPERATURE: 98 F | HEIGHT: 62 IN | OXYGEN SATURATION: 93 % | SYSTOLIC BLOOD PRESSURE: 118 MMHG | RESPIRATION RATE: 18 BRPM

## 2024-08-20 DIAGNOSIS — M85.852 OSTEOPENIA OF LEFT HIP: ICD-10-CM

## 2024-08-20 DIAGNOSIS — Z17.0 MALIGNANT NEOPLASM OF UPPER-OUTER QUADRANT OF LEFT BREAST IN FEMALE, ESTROGEN RECEPTOR POSITIVE (HCC): Primary | ICD-10-CM

## 2024-08-20 DIAGNOSIS — C50.412 MALIGNANT NEOPLASM OF UPPER-OUTER QUADRANT OF LEFT BREAST IN FEMALE, ESTROGEN RECEPTOR POSITIVE (HCC): Primary | ICD-10-CM

## 2024-08-20 DIAGNOSIS — Z79.811 LONG TERM (CURRENT) USE OF AROMATASE INHIBITORS: ICD-10-CM

## 2024-08-20 PROCEDURE — 96372 THER/PROPH/DIAG INJ SC/IM: CPT

## 2024-08-20 PROCEDURE — 99214 OFFICE O/P EST MOD 30 MIN: CPT | Performed by: PHYSICIAN ASSISTANT

## 2024-08-20 RX ADMIN — DENOSUMAB 60 MG: 60 INJECTION SUBCUTANEOUS at 11:04

## 2024-08-20 NOTE — PROGRESS NOTES
Hematology/Oncology Outpatient Follow- up Note  Aliyah Akins 78 y.o. female MRN: @ Encounter: [id number]        Date:  8/20/2024        Assessment / Plan:    1. Malignant neoplasm of upper-outer quadrant of left breast in female, estrogen receptor positive (HCC)    Rj 78-year-old female who is known to hematology/oncology for history of left breast cancer diagnosed in May 2019.  On Anastrozole 1 mg daily since 9/2019. At today's office visit it was discussed that new research suggests that patients who have lymph node involvement should remain on aromatase inhibitors for 7 to 10 years.  Patient reports she is tolerating anastrozole 1 mg daily without any significant side effects and is not opposed to continuing anastrozole therapy longer than 5 years.    Continue with yearly mammograms.  Next mammogram is due June 2025    Follow-up in 6 months with repeat labs.    - CBC and differential; Future  - Comprehensive metabolic panel; Future  - Cancer antigen 27.29; Future    2. Osteopenia of left hip 3. Long term (current) use of aromatase inhibitors    Continue vitamin D and calcium supplementation.  Last DEXA scan was completed on 11/30/2023.  At this time DEXA scan noted low bone density.  Recommend continuing Prolia 60 mg every 6 months.  Patient knows to get DEXA scan every 2 years.  Next DEXA scan is due in 11/20/2025.        HPI: Patient is a rj 78-year-old female who is known to hematology/oncology for history of left breast cancer diagnosed in May 2019.  In May 2019 a left breast ultrasound-guided biopsy was performed.  Biopsy noted % positive, % positive and HER2 negative by FISH.  Genetic testing performed in June 2019 for BRCA was negative.  In July 2019 left breast cancer was staged as 1B.  Patient underwent left breast lumpectomy with sentinel node biopsy on 7/26/2019.  1 lymph node noted with  metastatic carcinoma.  Patient completed radiation from 9/25/2019 to 10/23/2019.  " Patient was started on anastrozole 9/2019 .    Genetic testing in the past was completed due to history of ovarian cancer and it was negative.    Patient was started on Prolia 60 mg every 6 months on 1/13/2020.        Interval History: Pleasant 78-year-old female known to have hematology/oncology for history of Stage Ib left breast cancer.  Denies any chest pain, shortness of breath, headaches and palpitations.  Denies any blood in stool or urine.    Currently takes anastrozole 1 mg daily without complications.    Takes vitamin D and calcium supplements daily.    Currently following with pain management at [facility] regarding low back pain. NM Bone scan Spect with CT on 7/17/24 noted \" 1. Significant multilevel degenerative changes in moderately scoliotic lumbar spine as described. 2. Degenerative changes in mid cervical spine and both shoulders. \"    6/18/2024 diagnostic mammogram was completed with no findings of malignancy in either breast.  11/30/2023-DEXA scan was completed.  DEXA scan diagnosis of low bone mass notable at the femoral neck area.    Cancer Staging:  Cancer Staging   Malignant neoplasm of upper-outer quadrant of left breast in female, estrogen receptor positive (HCC)  Staging form: Breast, AJCC 8th Edition  - Clinical: Stage IB (cT2, cN0, cM0, G2, ER+, OH+, HER2-) - Signed by Joya Chester MD on 7/8/2019  Method of lymph node assessment: Clinical  Histologic grading system: 3 grade system  Laterality: Left  - Pathologic: Stage IB (pT2, pN1(sn), cM0, G2, ER+, OH+, HER2-) - Signed by Joya Chester MD on 8/12/2019  Neoadjuvant therapy: No  Method of lymph node assessment: [place] lymph node biopsy  Histologic grading system: 3 grade system  Laterality: Left      Molecular Testing:     Previous Hematologic/ Oncologic History:    Oncology History   Malignant neoplasm of upper-outer quadrant of left breast in female, estrogen receptor positive (HCC)   5/30/2019 Initial Diagnosis    Breast cancer " (HCC)     5/30/2019 Biopsy    Left breast, ultrasound-guided biopsy, 200 8cmfn 4 passes 12g Marquee:  - Invasive mammary carcinoma of no special type (ductal, not otherwise specified).  - grade 2  - %  - %  - HER2 by FISH negative     6/2019 Genetic Testing    BRCA negative     7/8/2019 -  Cancer Staged    Staging form: Breast, AJCC 8th Edition  - Clinical: Stage IB (cT2, cN0, cM0, G2, ER+, AZ+, HER2-) - Signed by Joya Chester MD on 7/8/2019  Laterality: Left  Method of lymph node assessment: Clinical  Histologic grading system: 3 grade system       7/26/2019 Surgery    Left breast lumpectomy with SLN biopsy:  - Invasive mammary carcinoma, 2.7 cm, grade 2  - approximately 80% of tumor is DCIS  - LN's - one lymph node with metastatic carcinoma, 2.1mm deposit, no extranodal extention  - + LVI  - margins negative     8/12/2019 -  Cancer Staged    Staging form: Breast, AJCC 8th Edition  - Pathologic: Stage IB (pT2, pN1(sn), cM0, G2, ER+, AZ+, HER2-) - Signed by Joya Chester MD on 8/12/2019  Neoadjuvant therapy: No  Laterality: Left  Method of lymph node assessment: [place] lymph node biopsy  Histologic grading system: 3 grade system       9/25/2019 - 10/23/2019 Radiation    Plan ID Energy Fractions Dose per Fraction (cGy) Dose Correction (cGy) Total Dose Delivered (cGy) Elapsed Days   BH L Breast 6X 16 / 16 266 0 4,256 21   L Brst Boost 12E 5 / 5 200 0 1,000 6     Dr Dunbar     9/2019 -  Hormone Therapy    Anastrozole 1 mg daily      Ovarian cancer (HCC)   2005 Initial Diagnosis    Ovarian cancer (HCC)     2005 Surgery    MARCO/BSO    Dr England     2005 -  Chemotherapy    Dr England             Test Results:    Imaging: No results found.          Labs:    ROS: Review of Systems   Constitutional: Negative.    Respiratory: Negative.     Cardiovascular: Negative.    Gastrointestinal:  Negative for blood in stool.   Genitourinary:  Negative for hematuria.   Musculoskeletal:  Positive for back pain.    Neurological:  Negative for headaches.   Psychiatric/Behavioral: Negative.           Current Medications: Reviewed  Allergies: Reviewed  PMH/FH/SH:  Reviewed      Physical Exam:    There is no height or weight on file to calculate BSA.    Wt Readings from Last 3 Encounters:   07/11/24 54.9 kg (121 lb)   06/18/24 55.3 kg (122 lb)   06/13/24 55.6 kg (122 lb 9.6 oz)        Temp Readings from Last 3 Encounters:   07/11/24 (!) 97.4 °F (36.3 °C) (Temporal)   02/15/24 97.7 °F (36.5 °C) (Temporal)   01/05/24 97.7 °F (36.5 °C) (Temporal)        BP Readings from Last 3 Encounters:   07/11/24 120/70   06/13/24 128/64   02/15/24 151/61         Pulse Readings from Last 3 Encounters:   07/11/24 69   02/15/24 84   01/05/24 87     @LASTSAO2(3)@      Physical Exam  Vitals and nursing note reviewed.   Constitutional:       Appearance: Normal appearance.   HENT:      Head: Normocephalic.   Eyes:      Extraocular Movements: Extraocular movements intact.   Cardiovascular:      Rate and Rhythm: Normal rate and regular rhythm.      Heart sounds: Normal heart sounds.   Pulmonary:      Breath sounds: Normal breath sounds. No wheezing.   Abdominal:      Palpations: Abdomen is soft.   Musculoskeletal:      Right lower leg: No edema.      Comments: HX of lymphedema in left leg.    Lymphadenopathy:      Cervical:      Right cervical: No superficial, deep or posterior cervical adenopathy.     Left cervical: No superficial, deep or posterior cervical adenopathy.      Upper Body:      Right upper body: No axillary adenopathy.      Left upper body: No axillary adenopathy.   Neurological:      General: No focal deficit present.      Mental Status: She is alert and oriented to person, place, and time.   Psychiatric:         Mood and Affect: Mood normal.         Behavior: Behavior normal.           Goals and Barriers:  Current Goal: Prolong Survival from Cancer.   Barriers: None.      Patient's Capacity to Self Care:  Patient is able to self  care.

## 2024-08-20 NOTE — PROGRESS NOTES
Pt offers no new complaints, denies jaw pains/upcoming dental procedures, labs used from 5-22-24: calcium 9.6/creat cl 35 , tolerated prolia well to left upper arm without complications. Confirmed next appt 2-25-25 11:00, AVS declined.

## 2024-09-12 DIAGNOSIS — Z17.0 MALIGNANT NEOPLASM OF UPPER-OUTER QUADRANT OF LEFT BREAST IN FEMALE, ESTROGEN RECEPTOR POSITIVE (HCC): ICD-10-CM

## 2024-09-12 DIAGNOSIS — C50.412 MALIGNANT NEOPLASM OF UPPER-OUTER QUADRANT OF LEFT BREAST IN FEMALE, ESTROGEN RECEPTOR POSITIVE (HCC): ICD-10-CM

## 2024-09-12 NOTE — TELEPHONE ENCOUNTER
Reason for call:   [x] Refill   [] Prior Auth  [] Other:     Office:   [] PCP/Provider -   [x] Specialty/Provider -  [facility] CARLSON     Medication: anastrozole (ARIMIDEX) 1 mg tablet     Dose/Frequency: Take 1 tablet (1 mg total) by mouth daily     Quantity: 90    Pharmacy: [id number] - [place] - [id number]     Does the patient have enough for 3 days?   [x] Yes   [] No - Send as HP to POD

## 2024-09-13 RX ORDER — ANASTROZOLE 1 MG/1
1 TABLET ORAL DAILY
Qty: 90 TABLET | Refills: 3 | Status: SHIPPED | OUTPATIENT
Start: 2024-09-13

## 2025-02-07 ENCOUNTER — TELEPHONE (OUTPATIENT)
Dept: HEMATOLOGY ONCOLOGY | Facility: CLINIC | Age: 80
End: 2025-02-07

## 2025-02-07 NOTE — TELEPHONE ENCOUNTER
LM for patient that Lana Grewal will be out of the office on 7/11/25 and we will need to reschedule the appointment.

## 2025-02-10 ENCOUNTER — TELEPHONE (OUTPATIENT)
Age: 80
End: 2025-02-10

## 2025-02-10 NOTE — TELEPHONE ENCOUNTER
Dr. Simmons calling in to speak with Arjun Powell regarding Aliyah. She reports an 8lb weight loss in the last year and findings on her recent CT scan from 2/5/25. Please call Dr. Simmons at [phone] to discuss.

## 2025-02-11 DIAGNOSIS — R92.1 MAMMOGRAPHIC CALCIFICATION FOUND ON DIAGNOSTIC IMAGING OF BREAST: ICD-10-CM

## 2025-02-11 DIAGNOSIS — R93.89 ABNORMAL CT OF THE CHEST: ICD-10-CM

## 2025-02-11 DIAGNOSIS — N63.10 MASS OF RIGHT BREAST, UNSPECIFIED QUADRANT: Primary | ICD-10-CM

## 2025-02-11 NOTE — TELEPHONE ENCOUNTER
Spoke with PCP and patient   Reviewed results of CT chest; rec sooner diagnostic mammogram     Please schedule diagnostic mammogram for next available appt.

## 2025-02-20 ENCOUNTER — TELEPHONE (OUTPATIENT)
Dept: HEMATOLOGY ONCOLOGY | Facility: CLINIC | Age: 80
End: 2025-02-20

## 2025-02-20 NOTE — TELEPHONE ENCOUNTER
Called patient to remind her to get labwork completed and patient stated she went to [facility] on 2/19 to complete labs.

## 2025-02-25 ENCOUNTER — TELEPHONE (OUTPATIENT)
Dept: HEMATOLOGY ONCOLOGY | Facility: CLINIC | Age: 80
End: 2025-02-25

## 2025-02-25 ENCOUNTER — OFFICE VISIT (OUTPATIENT)
Dept: HEMATOLOGY ONCOLOGY | Facility: CLINIC | Age: 80
End: 2025-02-25
Payer: MEDICARE

## 2025-02-25 ENCOUNTER — HOSPITAL ENCOUNTER (OUTPATIENT)
Dept: INFUSION CENTER | Facility: CLINIC | Age: 80
Discharge: HOME | End: 2025-02-25
Payer: MEDICARE

## 2025-02-25 VITALS
OXYGEN SATURATION: 96 % | SYSTOLIC BLOOD PRESSURE: 118 MMHG | WEIGHT: 121 LBS | RESPIRATION RATE: 18 BRPM | DIASTOLIC BLOOD PRESSURE: 64 MMHG | BODY MASS INDEX: 22.26 KG/M2 | TEMPERATURE: 97.5 F | HEIGHT: 62 IN | HEART RATE: 86 BPM

## 2025-02-25 DIAGNOSIS — Z17.0 MALIGNANT NEOPLASM OF UPPER-OUTER QUADRANT OF LEFT BREAST IN FEMALE, ESTROGEN RECEPTOR POSITIVE (HCC): Primary | ICD-10-CM

## 2025-02-25 DIAGNOSIS — C50.412 MALIGNANT NEOPLASM OF UPPER-OUTER QUADRANT OF LEFT BREAST IN FEMALE, ESTROGEN RECEPTOR POSITIVE (HCC): ICD-10-CM

## 2025-02-25 DIAGNOSIS — R93.89 ABNORMAL CT OF THE CHEST: Primary | ICD-10-CM

## 2025-02-25 DIAGNOSIS — M85.852 OSTEOPENIA OF LEFT HIP: ICD-10-CM

## 2025-02-25 DIAGNOSIS — Z17.0 MALIGNANT NEOPLASM OF UPPER-OUTER QUADRANT OF LEFT BREAST IN FEMALE, ESTROGEN RECEPTOR POSITIVE (HCC): ICD-10-CM

## 2025-02-25 DIAGNOSIS — C50.412 MALIGNANT NEOPLASM OF UPPER-OUTER QUADRANT OF LEFT BREAST IN FEMALE, ESTROGEN RECEPTOR POSITIVE (HCC): Primary | ICD-10-CM

## 2025-02-25 PROCEDURE — 99214 OFFICE O/P EST MOD 30 MIN: CPT | Performed by: PHYSICIAN ASSISTANT

## 2025-02-25 PROCEDURE — G2211 COMPLEX E/M VISIT ADD ON: HCPCS | Performed by: PHYSICIAN ASSISTANT

## 2025-02-25 PROCEDURE — 96372 THER/PROPH/DIAG INJ SC/IM: CPT

## 2025-02-25 RX ADMIN — DENOSUMAB 60 MG: 60 INJECTION SUBCUTANEOUS at 11:12

## 2025-02-25 NOTE — ASSESSMENT & PLAN NOTE
History of left-sided breast cancer ER/OK positive HER2 negative diagnosed in May 2019  She is on extended therapy due to metastatic disease to 1 lymph node  She is on Prolia for osteoporosis  She had CT imaging completed by PCP due to PCPs concern of weight loss.  We reviewed that today that her weight 6 months ago here was 122 and today 121.    T imaging noted 1.6 cm nodular density in the right breast with small calcification, slightly larger; 4 was recommended diagnostic mammogram which was ordered on 2/11 however staff has not scheduled this as requested. They will schedule today    Follow-up to be determined after imaging is reviewed    Protein drinks encouraged.

## 2025-02-25 NOTE — TELEPHONE ENCOUNTER
Please schedule patient for Prolia inj on 8/27. Pt is scheduled here at 10:30am, please schedule inj after.     Thank you!

## 2025-02-25 NOTE — PROGRESS NOTES
Pt presents for prolia administered in VLADIMIR. No new meds or concerns. Pt tolerated treatment without adverse reaction. Future appointments discussed, confirmed with patient for 8/27/25 1100. AVS declined.

## 2025-02-25 NOTE — PROGRESS NOTES
Name: Aliyah Akins      : 1945      MRN: [id number]  Encounter Provider: Angelic Parekh PA-C  Encounter Date: 2025   Encounter department: [facility] [place]  :  Assessment & Plan  Malignant neoplasm of upper-outer quadrant of left breast in female, estrogen receptor positive (HCC)  History of left-sided breast cancer ER/HI positive HER2 negative diagnosed in May 2019  She is on extended therapy due to metastatic disease to 1 lymph node  She is on Prolia for osteoporosis  She had CT imaging completed by PCP due to PCPs concern of weight loss.  We reviewed that today that her weight 6 months ago here was 122 and today 121.    T imaging noted 1.6 cm nodular density in the right breast with small calcification, slightly larger; 4 was recommended diagnostic mammogram which was ordered on  however staff has not scheduled this as requested. They will schedule today    Follow-up to be determined after imaging is reviewed    Protein drinks encouraged.          Abnormal CT of the chest             No follow-ups on file.    History of Present Illness   Chief Complaint   Patient presents with    Follow-up     Oncology History   Cancer Staging   Malignant neoplasm of upper-outer quadrant of left breast in female, estrogen receptor positive (HCC)  Staging form: Breast, AJCC 8th Edition  - Clinical: Stage IB (cT2, cN0, cM0, G2, ER+, HI+, HER2-) - Signed by Joya Chester MD on 2019  Method of lymph node assessment: Clinical  Histologic grading system: 3 grade system  Laterality: Left  - Pathologic: Stage IB (pT2, pN1(sn), cM0, G2, ER+, HI+, HER2-) - Signed by Joya Chester MD on 2019  Neoadjuvant therapy: No  Method of lymph node assessment: [place] lymph node biopsy  Histologic grading system: 3 grade system  Laterality: Left  Oncology History   Malignant neoplasm of upper-outer quadrant of left breast in female, estrogen receptor positive (HCC)    5/30/2019 Initial Diagnosis    Breast cancer (HCC)     5/30/2019 Biopsy    Left breast, ultrasound-guided biopsy, 200 8cmfn 4 passes 12g Marquee:  - Invasive mammary carcinoma of no special type (ductal, not otherwise specified).  - grade 2  - %  - %  - HER2 by FISH negative     6/2019 Genetic Testing    BRCA negative     7/8/2019 -  Cancer Staged    Staging form: Breast, AJCC 8th Edition  - Clinical: Stage IB (cT2, cN0, cM0, G2, ER+, AK+, HER2-) - Signed by Joya Chester MD on 7/8/2019  Laterality: Left  Method of lymph node assessment: Clinical  Histologic grading system: 3 grade system       7/26/2019 Surgery    Left breast lumpectomy with SLN biopsy:  - Invasive mammary carcinoma, 2.7 cm, grade 2  - approximately 80% of tumor is DCIS  - LN's - one lymph node with metastatic carcinoma, 2.1mm deposit, no extranodal extention  - + LVI  - margins negative     8/12/2019 -  Cancer Staged    Staging form: Breast, AJCC 8th Edition  - Pathologic: Stage IB (pT2, pN1(sn), cM0, G2, ER+, AK+, HER2-) - Signed by Joya Chester MD on 8/12/2019  Neoadjuvant therapy: No  Laterality: Left  Method of lymph node assessment: [place] lymph node biopsy  Histologic grading system: 3 grade system       9/25/2019 - 10/23/2019 Radiation    Plan ID Energy Fractions Dose per Fraction (cGy) Dose Correction (cGy) Total Dose Delivered (cGy) Elapsed Days   BH L Breast 6X 16 / 16 266 0 4,256 21   L Brst Boost 12E 5 / 5 200 0 1,000 6     Dr Dunbar     9/2019 -  Hormone Therapy    Anastrozole 1 mg daily      Ovarian cancer (HCC)   2005 Initial Diagnosis    Ovarian cancer (HCC)     2005 Surgery    MARCO/BSO    Dr England     2005 -  Chemotherapy    Dr England        Pertinent Medical History     02/25/25:  history of invasive mammary carcinoma who is status post lumpectomy and sentinel lymph node sampling.  Patient's tumor measured 2.7 cm, grade 2 of 3.  Approximately 80% of tumor is DCIS.     3 sentinel lymph nodes were removed.  1 had  focus of metastatic carcinoma measuring 2.1 mm; however negative for extranodal extension.     Patient's anatomic stage is at least stage IIB, T2, N1 a, C M0, G2.  %, %, her 2-by FISH     She had postoperative complications with hematoma formation for which she has been following Surgical Oncology.  She states that she continues to apply warm compresses to the area and it is improving.  She has follow-up with Surgical Oncology for routine visit in November 2019.      Following surgery patient was noted to have very slight invasion into 1 lymph node, stage IIB.  She was offered chemotherapy.  He was to proceed with aromatase inhibitor alone.     She is on anastrozole 1 mg.      Patient has had genetic testing in the past due to history of ovarian cancer and is negative.      She has been on Prolia 60 mg every 6 months with first dose on 1/13/20.     CT C/A/P on 2/5/25   1. Sequela of previous hysterectomy and bilateral salpingo-oophorectomy. No   pelvic mass. No pathologically enlarged lymphadenopathy the chest, abdomen or   pelvis.   2. Colonic diverticulosis without diverticulitis. No bowel obstruction.   3. Elongated collection adjacent to the right greater trochanter/proximal right   femur related to trochanteric bursitis.   4. Few small pulmonary nodules. Scattered areas of mucous plugging.   5. There is a 1.8 cm subcutaneous nodule superficial to the xiphoid, without   significant change, probable sebaceous cyst. Correlate with clinical exam.   6. Bilateral renal cysts, multiple of them have increased in size.   7. There is a 1.6 cm nodular density in the right breast with small   calcification, slightly larger. Correlate with mammogram.          Review of Systems   Constitutional:  Positive for unexpected weight change. Negative for appetite change, chills, fatigue and fever.   HENT:  Negative for mouth sores and nosebleeds.    Respiratory:  Negative for cough and shortness of breath.   "  Cardiovascular:  Negative for chest pain, palpitations and leg swelling.   Gastrointestinal:  Negative for abdominal pain, blood in stool, constipation, diarrhea, nausea and vomiting.   Genitourinary:  Negative for difficulty urinating, dysuria and hematuria.   Musculoskeletal:  Negative for arthralgias.   Skin: Negative.    Neurological:  Negative for dizziness, weakness, light-headedness, numbness and headaches.   Hematological: Negative.    Psychiatric/Behavioral: Negative.             Objective   /64 (BP Location: Right arm, Patient Position: Sitting, Cuff Size: Adult)   Pulse 86   Temp 97.5 °F (36.4 °C) (Temporal)   Resp 18   Ht 5' 2\" (1.575 m)   Wt 54.9 kg (121 lb)   SpO2 96%   BMI 22.13 kg/m²     Pain Screening:  Pain Score: 0-No pain  ECOG   1  Physical Exam  Vitals reviewed.   Constitutional:       General: She is not in acute distress.     Appearance: She is well-developed. She is not ill-appearing.   HENT:      Head: Normocephalic and atraumatic.   Eyes:      General: No scleral icterus.     Conjunctiva/sclera: Conjunctivae normal.   Cardiovascular:      Rate and Rhythm: Normal rate and regular rhythm.      Heart sounds: Normal heart sounds. No murmur heard.  Pulmonary:      Effort: Pulmonary effort is normal. No respiratory distress.      Breath sounds: Normal breath sounds.   Chest:   Breasts:     Right: No swelling, inverted nipple or mass.      Left: No swelling, inverted nipple or mass.   Abdominal:      Palpations: Abdomen is soft.      Tenderness: There is no abdominal tenderness.   Musculoskeletal:         General: No tenderness. Normal range of motion.      Cervical back: Normal range of motion and neck supple.      Right lower leg: No edema.      Left lower leg: No edema.   Lymphadenopathy:      Cervical: No cervical adenopathy.      Upper Body:      Right upper body: No supraclavicular adenopathy.      Left upper body: No supraclavicular adenopathy.   Skin:     General: Skin is " warm and dry.   Neurological:      Mental Status: She is alert and oriented to person, place, and time.      Cranial Nerves: No cranial nerve deficit.   Psychiatric:         Mood and Affect: Mood normal.         Behavior: Behavior normal.       Labs: I have reviewed the following labs:  Lab Results   Component Value Date/Time    Potassium 4.2 01/16/2025 02:35 PM    Chloride 100 01/16/2025 02:35 PM    Carbon Dioxide 37 (H) 01/16/2025 02:35 PM    BUN 38 (H) 01/16/2025 02:35 PM    Creatinine 1.09 01/16/2025 02:35 PM    Calcium 10.5 01/16/2025 02:35 PM    AST 33 12/03/2024 09:21 AM    ALT 26 12/03/2024 09:21 AM    Alkaline Phosphatase 40 12/03/2024 09:21 AM    Protein, Total 6.7 12/03/2024 09:21 AM    ALBUMIN 4.6 12/03/2024 09:21 AM    Total Bilirubin 0.7 12/03/2024 09:21 AM    eGFRcr 51 (L) 01/16/2025 02:35 PM

## 2025-03-31 ENCOUNTER — HOSPITAL ENCOUNTER (OUTPATIENT)
Dept: ULTRASOUND IMAGING | Facility: CLINIC | Age: 80
Discharge: HOME | End: 2025-03-31
Payer: MEDICARE

## 2025-03-31 ENCOUNTER — HOSPITAL ENCOUNTER (OUTPATIENT)
Dept: MAMMOGRAPHY | Facility: CLINIC | Age: 80
Discharge: HOME | End: 2025-03-31
Payer: MEDICARE

## 2025-03-31 VITALS — WEIGHT: 121 LBS | HEIGHT: 62 IN | BODY MASS INDEX: 22.26 KG/M2

## 2025-03-31 DIAGNOSIS — R92.1 MAMMOGRAPHIC CALCIFICATION FOUND ON DIAGNOSTIC IMAGING OF BREAST: ICD-10-CM

## 2025-03-31 DIAGNOSIS — R93.89 ABNORMAL CT OF THE CHEST: ICD-10-CM

## 2025-03-31 DIAGNOSIS — N63.0 BREAST NODULE: ICD-10-CM

## 2025-03-31 DIAGNOSIS — N63.10 MASS OF RIGHT BREAST, UNSPECIFIED QUADRANT: ICD-10-CM

## 2025-03-31 PROCEDURE — G0279 TOMOSYNTHESIS, MAMMO: HCPCS

## 2025-03-31 PROCEDURE — 76642 ULTRASOUND BREAST LIMITED: CPT

## 2025-03-31 PROCEDURE — 77066 DX MAMMO INCL CAD BI: CPT

## 2025-07-17 ENCOUNTER — ANNUAL EXAM (OUTPATIENT)
Dept: OBGYN CLINIC | Facility: CLINIC | Age: 80
End: 2025-07-17
Payer: MEDICARE

## 2025-07-17 VITALS
HEIGHT: 62 IN | DIASTOLIC BLOOD PRESSURE: 76 MMHG | WEIGHT: 117 LBS | SYSTOLIC BLOOD PRESSURE: 134 MMHG | BODY MASS INDEX: 21.53 KG/M2

## 2025-07-17 DIAGNOSIS — C50.412 MALIGNANT NEOPLASM OF UPPER-OUTER QUADRANT OF LEFT BREAST IN FEMALE, ESTROGEN RECEPTOR POSITIVE (HCC): Primary | ICD-10-CM

## 2025-07-17 DIAGNOSIS — C56.9 MALIGNANT NEOPLASM OF OVARY, UNSPECIFIED LATERALITY (HCC): ICD-10-CM

## 2025-07-17 DIAGNOSIS — Z91.89 GYN EXAM FOR HIGH-RISK MEDICARE PATIENT: ICD-10-CM

## 2025-07-17 DIAGNOSIS — Z17.0 MALIGNANT NEOPLASM OF UPPER-OUTER QUADRANT OF LEFT BREAST IN FEMALE, ESTROGEN RECEPTOR POSITIVE (HCC): Primary | ICD-10-CM

## 2025-07-17 PROCEDURE — G0101 CA SCREEN;PELVIC/BREAST EXAM: HCPCS | Performed by: STUDENT IN AN ORGANIZED HEALTH CARE EDUCATION/TRAINING PROGRAM

## 2025-07-17 NOTE — ASSESSMENT & PLAN NOTE
80 yo here for annual exam    Pap no longer indicated, s/p hysterectomy  Mammo due 3/26. Follows with breast surgeon  Colonoscopy no longer indicated  Recommend healthy diet and exercise  DEXA with osteopenia, on prolia, ca and vit D  Not sexually active

## 2025-07-17 NOTE — PROGRESS NOTES
Name: Aliyah Akins      : 1945      MRN: [id number]  Encounter Provider: Nemo Marquez MD  Encounter Date: 2025   Encounter department: [facility] [place]  :  Assessment & Plan  Malignant neoplasm of upper-outer quadrant of left breast in female, estrogen receptor positive (HCC)  Left breast, continues on anastrazole and to follow with breast surgeon         Malignant neoplasm of ovary, unspecified laterality (HCC)  , about 20 months of chemo, no recurrence         GYN exam for high-risk Medicare patient  80 yo here for annual exam    Pap no longer indicated, s/p hysterectomy  Mammo due 3/26. Follows with breast surgeon  Colonoscopy no longer indicated  Recommend healthy diet and exercise  DEXA with osteopenia, on prolia, ca and vit D  Not sexually active             History of Present Illness   HPI  Aliyah Akins is a 79 y.o. female who presents for annual exam. No bleeding. No vulvar concerns. No breast concerns. No pelvic pain. She occasionally has an area on her vulva that feels like a small pimple that self resolves. Has had some recent wt loss and we talked about calorie density. Not sexually active.  is in care facility, bladder cancer. She had a fall in , still sore but healing.  History obtained from: patient    Review of Systems   Constitutional:  Positive for unexpected weight change. Negative for chills and fever.   HENT:  Negative for ear pain and sore throat.    Eyes:  Negative for pain and visual disturbance.   Respiratory:  Negative for cough and shortness of breath.    Cardiovascular:  Negative for chest pain and palpitations.   Gastrointestinal:  Negative for abdominal pain, constipation, diarrhea, nausea and vomiting.   Genitourinary:  Negative for dysuria, frequency, hematuria, urgency, vaginal bleeding, vaginal discharge and vaginal pain.   Musculoskeletal:  Negative for arthralgias and back pain.  "  Skin:  Negative for color change and rash.   Neurological:  Negative for seizures and syncope.   All other systems reviewed and are negative.    Medical History Reviewed by provider this encounter:     .     Objective   /76 (BP Location: Right arm, Patient Position: Sitting, Cuff Size: Standard)   Ht 5' 2\" (1.575 m)   Wt 53.1 kg (117 lb)   BMI 21.40 kg/m²      Physical Exam  Vitals and nursing note reviewed.   Constitutional:       General: She is not in acute distress.     Appearance: She is well-developed.   HENT:      Head: Normocephalic and atraumatic.     Eyes:      Conjunctiva/sclera: Conjunctivae normal.       Cardiovascular:      Rate and Rhythm: Normal rate and regular rhythm.      Heart sounds: No murmur heard.  Pulmonary:      Effort: Pulmonary effort is normal. No respiratory distress.      Breath sounds: Normal breath sounds.   Chest:   Breasts:     Breasts are symmetrical.      Right: No swelling, bleeding, inverted nipple, mass, nipple discharge, skin change or tenderness.      Left: No swelling, bleeding, inverted nipple, mass, nipple discharge, skin change or tenderness.        Comments: AK noted  Abdominal:      Palpations: Abdomen is soft.      Tenderness: There is no abdominal tenderness.   Genitourinary:     Labia:         Right: No rash, tenderness, lesion or injury.         Left: No rash, tenderness, lesion or injury.       Vagina: No vaginal discharge, erythema, tenderness or bleeding.      Cervix: No friability, lesion, erythema or cervical bleeding.      Uterus: Not enlarged, not fixed and not tender.       Adnexa: Right adnexa normal and left adnexa normal.        Right: No mass, tenderness or fullness.          Left: No mass, tenderness or fullness.        Comments: Peds spec. Rectovaginal exam without nodularity or masses.     Musculoskeletal:         General: No swelling.      Cervical back: Neck supple.   Lymphadenopathy:      Upper Body:      Right upper body: No " supraclavicular, axillary or pectoral adenopathy.      Left upper body: No supraclavicular, axillary or pectoral adenopathy.     Skin:     General: Skin is warm and dry.      Capillary Refill: Capillary refill takes less than 2 seconds.     Neurological:      General: No focal deficit present.      Mental Status: She is alert. Mental status is at baseline.     Psychiatric:         Mood and Affect: Mood normal.         Behavior: Behavior normal.         Thought Content: Thought content normal.         Judgment: Judgment normal.         Administrative Statements   I have spent a total time of 25 minutes in caring for this patient on the day of the visit/encounter including Impressions, Counseling / Coordination of care, Documenting in the medical record, Reviewing/placing orders in the medical record (including tests, medications, and/or procedures), and Obtaining or reviewing history  .

## 2025-07-24 ENCOUNTER — OFFICE VISIT (OUTPATIENT)
Dept: SURGICAL ONCOLOGY | Facility: CLINIC | Age: 80
End: 2025-07-24
Payer: MEDICARE

## 2025-07-24 VITALS
OXYGEN SATURATION: 94 % | SYSTOLIC BLOOD PRESSURE: 140 MMHG | HEART RATE: 78 BPM | HEIGHT: 62 IN | DIASTOLIC BLOOD PRESSURE: 82 MMHG | TEMPERATURE: 97.7 F | BODY MASS INDEX: 21.4 KG/M2

## 2025-07-24 DIAGNOSIS — Z79.811 USE OF ANASTROZOLE: ICD-10-CM

## 2025-07-24 DIAGNOSIS — Z17.0 MALIGNANT NEOPLASM OF UPPER-OUTER QUADRANT OF LEFT BREAST IN FEMALE, ESTROGEN RECEPTOR POSITIVE (HCC): ICD-10-CM

## 2025-07-24 DIAGNOSIS — Z08 ENCOUNTER FOR FOLLOW-UP EXAMINATION AFTER COMPLETED TREATMENT FOR MALIGNANT NEOPLASM: Primary | ICD-10-CM

## 2025-07-24 DIAGNOSIS — Z12.31 VISIT FOR SCREENING MAMMOGRAM: ICD-10-CM

## 2025-07-24 DIAGNOSIS — C50.412 MALIGNANT NEOPLASM OF UPPER-OUTER QUADRANT OF LEFT BREAST IN FEMALE, ESTROGEN RECEPTOR POSITIVE (HCC): ICD-10-CM

## 2025-07-24 PROCEDURE — 99214 OFFICE O/P EST MOD 30 MIN: CPT

## 2025-07-24 NOTE — ASSESSMENT & PLAN NOTE
Patient is an 79-year-old female presenting today for a follow-up for left breast cancer diagnosed in May 2019. Pathology revealed invasive mammary carcinoma ER/%, HER2 FISH negative. She underwent genetic testing which was negative. She had a left breast lumpectomy with sentinel node biopsy with Dr. Chester. She completed whole breast radiation therapy and is currently on anastrozole. She had a b/l dx mammogram and u/s of the right breast on 3/31/25 which were BIRADS 2 category 3 density. The questioned mass with internal calcification seen on outside chest CT corresponds to the mass seen at 10:00 3 cm from the nipple within the right breast. This mass appears unchanged in size when compared to CT dated 4/1/2011 and 8/22/2005. Of note, this area also appears unchanged mammographically. 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 Patient can return to routine screening. Order was placed for screening mammo next year. There were no concerns on her cbe. Patient denies changes on her breast exam. She denies persistent headaches, bone pain, back pain, shortness of breath, or abdominal pain. I will see the patient back in 1 year or sooner should the need arise. She was instructed to call with any questions or concerns prior to this time. All questions were answered today.     - 1 year f/u

## 2025-07-24 NOTE — PROGRESS NOTES
Name: Aliyah Akins      : 1945      MRN: [id number]  Encounter Provider: SYLVIA Denny  Encounter Date: 2025   Encounter department: [facility] [place]  :  Assessment & Plan  Encounter for follow-up examination after completed treatment for malignant neoplasm    Malignant neoplasm of upper-outer quadrant of left breast in female, estrogen receptor positive (HCC)  Use of anastrozole  Patient is an 79-year-old female presenting today for a follow-up for left breast cancer diagnosed in May 2019. Pathology revealed invasive mammary carcinoma ER/%, HER2 FISH negative. She underwent genetic testing which was negative. She had a left breast lumpectomy with sentinel node biopsy with Dr. Chester. She completed whole breast radiation therapy and is currently on anastrozole. She had a b/l dx mammogram and u/s of the right breast on 3/31/25 which were BIRADS 2 category 3 density. The questioned mass with internal calcification seen on outside chest CT corresponds to the mass seen at 10:00 3 cm from the nipple within the right breast. This mass appears unchanged in size when compared to CT dated 2011 and 2005. Of note, this area also appears unchanged mammographically. 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 Patient can return to routine screening. Order was placed for screening mammo next year. There were no concerns on her cbe. Patient denies changes on her breast exam. She denies persistent headaches, bone pain, back pain, shortness of breath, or abdominal pain. I will see the patient back in 1 year or sooner should the need arise. She was instructed to call with any questions or concerns prior to this time. All questions were answered today.     - 1 year f/u  Visit for screening mammogram  Orders:  •  Mammo screening bilateral w 3d and cad; Future      Oncology History    Cancer Staging   Malignant neoplasm of upper-outer quadrant of left breast in female, estrogen receptor positive (HCC)  Staging form: Breast, AJCC 8th Edition  - Clinical: Stage IB (cT2, cN0, cM0, G2, ER+, ND+, HER2-) - Signed by Joya Chester MD on 7/8/2019  Method of lymph node assessment: Clinical  Histologic grading system: 3 grade system  Laterality: Left  - Pathologic: Stage IB (pT2, pN1(sn), cM0, G2, ER+, ND+, HER2-) - Signed by Joya Chester MD on 8/12/2019  Neoadjuvant therapy: No  Method of lymph node assessment: [place] lymph node biopsy  Histologic grading system: 3 grade system  Laterality: Left  Oncology History   Malignant neoplasm of upper-outer quadrant of left breast in female, estrogen receptor positive (HCC)   5/30/2019 Initial Diagnosis    Breast cancer (HCC)     5/30/2019 Biopsy    Left breast, ultrasound-guided biopsy, 200 8cmfn 4 passes 12g Marquee:  - Invasive mammary carcinoma of no special type (ductal, not otherwise specified).  - grade 2  - %  - %  - HER2 by FISH negative     6/2019 Genetic Testing    BRCA negative     7/8/2019 -  Cancer Staged    Staging form: Breast, AJCC 8th Edition  - Clinical: Stage IB (cT2, cN0, cM0, G2, ER+, ND+, HER2-) - Signed by Joya Chester MD on 7/8/2019  Laterality: Left  Method of lymph node assessment: Clinical  Histologic grading system: 3 grade system       7/26/2019 Surgery    Left breast lumpectomy with SLN biopsy:  - Invasive mammary carcinoma, 2.7 cm, grade 2  - approximately 80% of tumor is DCIS  - LN's - one lymph node with metastatic carcinoma, 2.1mm deposit, no extranodal extention  - + LVI  - margins negative     8/12/2019 -  Cancer Staged    Staging form: Breast, AJCC 8th Edition  - Pathologic: Stage IB (pT2, pN1(sn), cM0, G2, ER+, ND+, HER2-) - Signed by Joya Chester MD on 8/12/2019  Neoadjuvant therapy: No  Laterality: Left  Method of lymph node assessment: [place] lymph node biopsy  Histologic grading system: 3 grade  "system       9/25/2019 - 10/23/2019 Radiation    Plan ID Energy Fractions Dose per Fraction (cGy) Dose Correction (cGy) Total Dose Delivered (cGy) Elapsed Days   BH L Breast 6X 16 / 16 266 0 4,256 21   L Brst Boost 12E 5 / 5 200 0 1,000 6     Dr Dunbar     9/2019 -  Hormone Therapy    Anastrozole 1 mg daily      Ovarian cancer (HCC)   2005 Initial Diagnosis    Ovarian cancer (HCC)     2005 Surgery    MARCO/BSO    Dr England     2005 -  Chemotherapy    Dr England        Review of Systems   Constitutional:  Negative for activity change, appetite change, fatigue and unexpected weight change.   Respiratory:  Negative for cough and shortness of breath.    Cardiovascular:  Negative for chest pain.   Gastrointestinal:  Negative for abdominal pain, diarrhea, nausea and vomiting.   Endocrine: Negative for heat intolerance.   Musculoskeletal:  Negative for arthralgias, back pain and myalgias.   Skin:  Negative for rash.   Neurological:  Negative for weakness and headaches.   Hematological:  Negative for adenopathy.    A complete review of systems is negative other than that noted above in the HPI.    Objective   /82 (BP Location: Right arm, Patient Position: Sitting, Cuff Size: Standard)   Pulse 78   Temp 97.7 °F (36.5 °C) (Temporal)   Ht 5' 2\" (1.575 m)   SpO2 94%   BMI 21.40 kg/m²     Pain Screening:  Pain Score: 0-No pain  ECOG    Physical Exam  Constitutional:       General: She is not in acute distress.     Appearance: Normal appearance.     Cardiovascular:      Rate and Rhythm: Normal rate and regular rhythm.      Pulses: Normal pulses.      Heart sounds: Normal heart sounds.   Pulmonary:      Effort: Pulmonary effort is normal.      Breath sounds: Normal breath sounds.   Chest:      Chest wall: No mass.   Breasts:     Right: No swelling, bleeding, inverted nipple, mass, nipple discharge, skin change or tenderness.      Left: No swelling, bleeding, inverted nipple, mass, nipple discharge, skin change or " tenderness.   Abdominal:      General: Abdomen is flat.      Palpations: Abdomen is soft.   Lymphadenopathy:      Upper Body:      Right upper body: No supraclavicular, axillary or pectoral adenopathy.      Left upper body: No supraclavicular, axillary or pectoral adenopathy.     Skin:     General: Skin is warm.     Neurological:      General: No focal deficit present.      Mental Status: She is alert and oriented to person, place, and time.     Psychiatric:         Mood and Affect: Mood normal.         Behavior: Behavior normal.          Labs: I have reviewed pertinent labs.   No visits with results within 1 Month(s) from this visit.   Latest known visit with results is:   Appointment on 07/13/2020   Component Date Value Ref Range Status   • Sodium 07/13/2020 145  136 - 145 mmol/L Final   • Potassium 07/13/2020 4.6  3.5 - 5.3 mmol/L Final   • Chloride 07/13/2020 99  98 - 108 mmol/L Final   • CO2 07/13/2020 34 (H)  21 - 32 mmol/L Final   • ANION GAP 07/13/2020 12  4 - 13 mmol/L Final   • BUN 07/13/2020 25  5 - 25 mg/dL Final   • Creatinine 07/13/2020 1.10  0.60 - 1.30 mg/dL Final    Standardized to IDMS reference method   • Glucose 07/13/2020 91  65 - 140 mg/dL Final      If the patient is fasting, the ADA then defines impaired fasting glucose as > 100 mg/dL and diabetes as > or equal to 123 mg/dL.  Specimen collection should occur prior to Sulfasalazine administration due to the potential for falsely depressed results. Specimen collection should occur prior to Sulfapyridine administration due to the potential for falsely elevated results.   • Calcium 07/13/2020 10.1  8.3 - 10.1 mg/dL Final   • AST 07/13/2020 41  5 - 45 U/L Final      Specimen collection should occur prior to Sulfasalazine administration due to the potential for falsely depressed results.    • ALT 07/13/2020 32  12 - 78 U/L Final      Specimen collection should occur prior to Sulfasalazine administration due to the potential for falsely depressed  results.    • Alkaline Phosphatase 07/13/2020 56  46 - 116 U/L Final   • Total Protein 07/13/2020 6.8  6.4 - 8.2 g/dL Final   • Albumin 07/13/2020 4.2  3.5 - 5.7 g/dL Final   • Total Bilirubin 07/13/2020 0.70  0.20 - 1.00 mg/dL Final      Use of this assay is not recommended for patients undergoing treatment with eltrombopag due to the potential for falsely elevated results.   • eGFR 07/13/2020 49  ml/min/1.73sq m Final

## 2025-08-16 PROBLEM — Z91.89 GYN EXAM FOR HIGH-RISK MEDICARE PATIENT: Status: RESOLVED | Noted: 2025-07-17 | Resolved: 2025-08-16

## 2025-08-19 ENCOUNTER — TELEPHONE (OUTPATIENT)
Age: 80
End: 2025-08-19

## 2025-08-23 PROBLEM — Z08 ENCOUNTER FOR FOLLOW-UP EXAMINATION AFTER COMPLETED TREATMENT FOR MALIGNANT NEOPLASM: Status: RESOLVED | Noted: 2025-07-24 | Resolved: 2025-08-23

## (undated) DEVICE — SCD SEQUENTIAL COMPRESSION COMFORT SLEEVE MEDIUM KNEE LENGTH: Brand: KENDALL SCD

## (undated) DEVICE — BRA SURGICAL SZ LGE (36-39)

## (undated) DEVICE — TUBING SUCTION 5MM X 12 FT

## (undated) DEVICE — 2000CC GUARDIAN II: Brand: GUARDIAN

## (undated) DEVICE — PLUMEPEN PRO 10FT

## (undated) DEVICE — CHLORAPREP HI-LITE 26ML ORANGE

## (undated) DEVICE — GLOVE SRG BIOGEL 6

## (undated) DEVICE — SUT MONOCRYL 3-0 SH 27 IN Y416H

## (undated) DEVICE — MEDI-VAC YANKAUER SUCTION HANDLE W/BULBOUS AND CONTROL VENT: Brand: CARDINAL HEALTH

## (undated) DEVICE — BETHLEHEM UNIVERSAL MINOR GEN: Brand: CARDINAL HEALTH

## (undated) DEVICE — INTENDED FOR TISSUE SEPARATION, AND OTHER PROCEDURES THAT REQUIRE A SHARP SURGICAL BLADE TO PUNCTURE OR CUT.: Brand: BARD-PARKER SAFETY BLADES SIZE 15, STERILE

## (undated) DEVICE — SUPER SPONGES,MEDIUM: Brand: KERLIX

## (undated) DEVICE — NEEDLE 25G X 1 1/2

## (undated) DEVICE — DRAPE PROBE NEO-PROBE/ULTRASOUND

## (undated) DEVICE — LIGACLIP MCA MULTIPLE CLIP APPLIERS, 20 SMALL CLIPS: Brand: LIGACLIP

## (undated) DEVICE — GLOVE INDICATOR UNDERGLOVE SZ 6 BLUE

## (undated) DEVICE — SUT MONOCRYL 4-0 PS-2 27 IN Y426H

## (undated) DEVICE — SUT SILK 2-0 SH 30 IN K833H

## (undated) DEVICE — ADHESIVE SKN CLSR HISTOACRYL FLEX 0.5ML LF

## (undated) DEVICE — GLOVE PI ULTRA TOUCH SZ.6.5